# Patient Record
Sex: MALE | Race: OTHER | HISPANIC OR LATINO | Employment: FULL TIME | URBAN - METROPOLITAN AREA
[De-identification: names, ages, dates, MRNs, and addresses within clinical notes are randomized per-mention and may not be internally consistent; named-entity substitution may affect disease eponyms.]

---

## 2017-01-14 ENCOUNTER — HOSPITAL ENCOUNTER (EMERGENCY)
Facility: HOSPITAL | Age: 37
End: 2017-01-15
Attending: EMERGENCY MEDICINE | Admitting: EMERGENCY MEDICINE
Payer: COMMERCIAL

## 2017-01-14 DIAGNOSIS — R44.3 HALLUCINATIONS: Primary | ICD-10-CM

## 2017-01-14 LAB
AMPHETAMINES SERPL QL SCN: NEGATIVE
BARBITURATES UR QL: NEGATIVE
BENZODIAZ UR QL: POSITIVE
COCAINE UR QL: POSITIVE
ETHANOL EXG-MCNC: NORMAL MG/DL
METHADONE UR QL: NEGATIVE
OPIATES UR QL SCN: POSITIVE
PCP UR QL: NEGATIVE
THC UR QL: NEGATIVE

## 2017-01-14 PROCEDURE — 80307 DRUG TEST PRSMV CHEM ANLYZR: CPT | Performed by: EMERGENCY MEDICINE

## 2017-01-14 PROCEDURE — 82075 ASSAY OF BREATH ETHANOL: CPT | Performed by: EMERGENCY MEDICINE

## 2017-01-15 VITALS
TEMPERATURE: 97.5 F | DIASTOLIC BLOOD PRESSURE: 88 MMHG | RESPIRATION RATE: 16 BRPM | OXYGEN SATURATION: 97 % | HEART RATE: 66 BPM | WEIGHT: 218 LBS | SYSTOLIC BLOOD PRESSURE: 148 MMHG

## 2017-01-15 PROCEDURE — 99285 EMERGENCY DEPT VISIT HI MDM: CPT

## 2017-01-15 RX ORDER — LORAZEPAM 0.5 MG/1
0.5 TABLET ORAL ONCE
Status: COMPLETED | OUTPATIENT
Start: 2017-01-15 | End: 2017-01-15

## 2017-01-15 RX ORDER — LANOLIN ALCOHOL/MO/W.PET/CERES
3 CREAM (GRAM) TOPICAL
Status: DISCONTINUED | OUTPATIENT
Start: 2017-01-15 | End: 2017-01-15

## 2017-01-15 RX ORDER — LORAZEPAM 0.5 MG/1
1 TABLET ORAL ONCE
Status: COMPLETED | OUTPATIENT
Start: 2017-01-15 | End: 2017-01-15

## 2017-01-15 RX ORDER — LANOLIN ALCOHOL/MO/W.PET/CERES
3 CREAM (GRAM) TOPICAL
Status: DISCONTINUED | OUTPATIENT
Start: 2017-01-15 | End: 2017-01-15 | Stop reason: HOSPADM

## 2017-01-15 RX ADMIN — LORAZEPAM 0.5 MG: 0.5 TABLET ORAL at 15:31

## 2017-01-15 RX ADMIN — MELATONIN TAB 3 MG 3 MG: 3 TAB at 04:53

## 2017-01-15 RX ADMIN — LORAZEPAM 1 MG: 0.5 TABLET ORAL at 02:38

## 2017-08-16 ENCOUNTER — HOSPITAL ENCOUNTER (EMERGENCY)
Facility: HOSPITAL | Age: 37
Discharge: HOME | End: 2017-08-16
Attending: EMERGENCY MEDICINE
Payer: COMMERCIAL

## 2017-08-16 VITALS
SYSTOLIC BLOOD PRESSURE: 160 MMHG | RESPIRATION RATE: 18 BRPM | DIASTOLIC BLOOD PRESSURE: 75 MMHG | OXYGEN SATURATION: 97 % | WEIGHT: 226 LBS | HEART RATE: 74 BPM | TEMPERATURE: 97.9 F

## 2017-08-16 DIAGNOSIS — R51.9 HEADACHE: Primary | ICD-10-CM

## 2017-08-16 DIAGNOSIS — R11.2 NAUSEA AND VOMITING: ICD-10-CM

## 2017-08-16 PROCEDURE — 96361 HYDRATE IV INFUSION ADD-ON: CPT

## 2017-08-16 PROCEDURE — 99283 EMERGENCY DEPT VISIT LOW MDM: CPT

## 2017-08-16 PROCEDURE — 96374 THER/PROPH/DIAG INJ IV PUSH: CPT

## 2017-08-16 PROCEDURE — 96375 TX/PRO/DX INJ NEW DRUG ADDON: CPT

## 2017-08-16 RX ORDER — DIPHENHYDRAMINE HYDROCHLORIDE 50 MG/ML
25 INJECTION INTRAMUSCULAR; INTRAVENOUS ONCE
Status: COMPLETED | OUTPATIENT
Start: 2017-08-16 | End: 2017-08-16

## 2017-08-16 RX ORDER — KETOROLAC TROMETHAMINE 30 MG/ML
15 INJECTION, SOLUTION INTRAMUSCULAR; INTRAVENOUS ONCE
Status: COMPLETED | OUTPATIENT
Start: 2017-08-16 | End: 2017-08-16

## 2017-08-16 RX ORDER — METOCLOPRAMIDE HYDROCHLORIDE 5 MG/ML
10 INJECTION INTRAMUSCULAR; INTRAVENOUS ONCE
Status: COMPLETED | OUTPATIENT
Start: 2017-08-16 | End: 2017-08-16

## 2017-08-16 RX ORDER — IBUPROFEN 600 MG/1
600 TABLET ORAL EVERY 6 HOURS PRN
Qty: 30 TABLET | Refills: 0 | Status: SHIPPED | OUTPATIENT
Start: 2017-08-16 | End: 2018-06-22

## 2017-08-16 RX ORDER — ONDANSETRON 4 MG/1
4 TABLET, FILM COATED ORAL EVERY 6 HOURS
Qty: 12 TABLET | Refills: 0 | Status: SHIPPED | OUTPATIENT
Start: 2017-08-16 | End: 2018-06-22

## 2017-08-16 RX ADMIN — METOCLOPRAMIDE 10 MG: 5 INJECTION, SOLUTION INTRAMUSCULAR; INTRAVENOUS at 22:42

## 2017-08-16 RX ADMIN — KETOROLAC TROMETHAMINE 15 MG: 30 INJECTION, SOLUTION INTRAMUSCULAR at 22:38

## 2017-08-16 RX ADMIN — SODIUM CHLORIDE 1000 ML: 0.9 INJECTION, SOLUTION INTRAVENOUS at 22:38

## 2017-08-16 RX ADMIN — DIPHENHYDRAMINE HYDROCHLORIDE 25 MG: 50 INJECTION, SOLUTION INTRAMUSCULAR; INTRAVENOUS at 22:40

## 2018-06-22 ENCOUNTER — HOSPITAL ENCOUNTER (EMERGENCY)
Facility: HOSPITAL | Age: 38
Discharge: HOME | End: 2018-06-22
Attending: EMERGENCY MEDICINE
Payer: COMMERCIAL

## 2018-06-22 VITALS
HEIGHT: 68 IN | TEMPERATURE: 97.9 F | BODY MASS INDEX: 30.99 KG/M2 | OXYGEN SATURATION: 97 % | HEART RATE: 71 BPM | RESPIRATION RATE: 16 BRPM | SYSTOLIC BLOOD PRESSURE: 120 MMHG | DIASTOLIC BLOOD PRESSURE: 63 MMHG | WEIGHT: 204.5 LBS

## 2018-06-22 DIAGNOSIS — R51.9 LEFT-SIDED HEADACHE: ICD-10-CM

## 2018-06-22 DIAGNOSIS — K04.7 DENTAL INFECTION: ICD-10-CM

## 2018-06-22 DIAGNOSIS — R51.9 LEFT FACIAL PAIN: Primary | ICD-10-CM

## 2018-06-22 PROCEDURE — 99283 EMERGENCY DEPT VISIT LOW MDM: CPT

## 2018-06-22 PROCEDURE — 96372 THER/PROPH/DIAG INJ SC/IM: CPT

## 2018-06-22 RX ORDER — PENICILLIN V POTASSIUM 500 MG/1
500 TABLET ORAL 4 TIMES DAILY
Qty: 40 TABLET | Refills: 0 | Status: SHIPPED | OUTPATIENT
Start: 2018-06-22 | End: 2018-07-02

## 2018-06-22 RX ORDER — TRAMADOL HYDROCHLORIDE 50 MG/1
50 TABLET ORAL ONCE
Status: COMPLETED | OUTPATIENT
Start: 2018-06-22 | End: 2018-06-22

## 2018-06-22 RX ORDER — IBUPROFEN 800 MG/1
800 TABLET ORAL EVERY 8 HOURS PRN
Qty: 30 TABLET | Refills: 0 | Status: SHIPPED | OUTPATIENT
Start: 2018-06-22 | End: 2019-07-01 | Stop reason: ALTCHOICE

## 2018-06-22 RX ORDER — TRAMADOL HYDROCHLORIDE 50 MG/1
50 TABLET ORAL EVERY 6 HOURS PRN
Qty: 20 TABLET | Refills: 0 | Status: SHIPPED | OUTPATIENT
Start: 2018-06-22 | End: 2018-06-27

## 2018-06-22 RX ORDER — PENICILLIN V POTASSIUM 250 MG/1
500 TABLET ORAL ONCE
Status: COMPLETED | OUTPATIENT
Start: 2018-06-22 | End: 2018-06-22

## 2018-06-22 RX ORDER — KETOROLAC TROMETHAMINE 30 MG/ML
30 INJECTION, SOLUTION INTRAMUSCULAR; INTRAVENOUS ONCE
Status: COMPLETED | OUTPATIENT
Start: 2018-06-22 | End: 2018-06-22

## 2018-06-22 RX ADMIN — KETOROLAC TROMETHAMINE 30 MG: 30 INJECTION, SOLUTION INTRAMUSCULAR at 21:56

## 2018-06-22 RX ADMIN — PENICILLIN V POTASSIUM 500 MG: 250 TABLET, FILM COATED ORAL at 21:57

## 2018-06-22 RX ADMIN — TRAMADOL HYDROCHLORIDE 50 MG: 50 TABLET, FILM COATED ORAL at 21:57

## 2018-06-23 NOTE — ED PROVIDER NOTES
History  Chief Complaint   Patient presents with    Headache     pt reports headache pain x 3 days  Pt denies cold/cough symptoms  Pt reports pressure in front of head  No nausea or vomiting  Pt last took Tylenol at 1000 today  29 yo male with 3 days of L sided facial pain  Pt has had loose L upper 2nd molar recently and has some mild discomfort with biting down  Pain refers to L jaw and ear area and L forehead  NO cellulitis or swelling noted  Obvious lymphadenopathy which is tender to palpate  History provided by:  Patient and significant other   used: No    Dental Pain   Location:  Upper  Upper teeth location:  15/MARLO 2nd molar  Quality:  Aching and dull  Severity:  Severe  Onset quality:  Gradual  Duration:  3 days  Timing:  Constant  Progression:  Worsening  Chronicity:  New  Context: not dental caries, not malocclusion and normal dentition    Relieved by:  Nothing  Worsened by:  Nothing  Ineffective treatments:  None tried  Associated symptoms: facial pain and headaches (L frontal and L facial)    Associated symptoms: no congestion, no difficulty swallowing, no drooling, no facial swelling, no fever, no gum swelling, no neck pain, no neck swelling and no trismus    Risk factors: no diabetes and no immunosuppression        None       Past Medical History:   Diagnosis Date    Anxiety     Depression     GERD (gastroesophageal reflux disease)     Migraine     No known problems        Past Surgical History:   Procedure Laterality Date    NO PAST SURGERIES         History reviewed  No pertinent family history  I have reviewed and agree with the history as documented  Social History   Substance Use Topics    Smoking status: Current Every Day Smoker     Packs/day: 0 50     Types: Cigarettes    Smokeless tobacco: Never Used    Alcohol use No        Review of Systems   Constitutional: Negative for chills and fever  HENT: Positive for dental problem   Negative for congestion, drooling, facial swelling, rhinorrhea, sore throat, tinnitus, trouble swallowing and voice change  Eyes: Negative for visual disturbance  Respiratory: Negative for shortness of breath and wheezing  Cardiovascular: Negative for chest pain and palpitations  Gastrointestinal: Negative for abdominal pain, diarrhea, nausea and vomiting  Genitourinary: Negative for dysuria  Musculoskeletal: Negative for neck pain and neck stiffness  Skin: Negative for pallor and rash  Neurological: Positive for headaches (L frontal and L facial)  Negative for dizziness, seizures, syncope, speech difficulty and numbness  Hematological: Does not bruise/bleed easily  Psychiatric/Behavioral: Negative for confusion  The patient is not nervous/anxious  All other systems reviewed and are negative  Physical Exam  Physical Exam   Constitutional: He is oriented to person, place, and time  He appears well-developed and well-nourished  He appears distressed (uncomfortable)  HENT:   Head: Normocephalic and atraumatic  Right Ear: External ear normal    Left Ear: External ear normal    Mouth/Throat: Oropharynx is clear and moist    B/l TM nml   Eyes: EOM are normal  Pupils are equal, round, and reactive to light  Neck: Normal range of motion  Neck supple  Cardiovascular: Normal rate  No murmur heard  Pulmonary/Chest: Effort normal    Musculoskeletal: Normal range of motion  He exhibits no edema  Lymphadenopathy:     He has cervical adenopathy (L sided Periauricular, L anter cervical lymhadenopathy, no mastoid tenderness, swelling or erythema)  Neurological: He is alert and oriented to person, place, and time  Skin: Skin is warm  No rash noted  No pallor  Psychiatric: He has a normal mood and affect  His behavior is normal    Nursing note and vitals reviewed        Vital Signs  ED Triage Vitals [06/22/18 2106]   Temperature Pulse Respirations Blood Pressure SpO2   97 9 °F (36 6 °C) 71 16 120/63 97 %      Temp Source Heart Rate Source Patient Position - Orthostatic VS BP Location FiO2 (%)   Oral Monitor Sitting Right arm --      Pain Score       9           Vitals:    06/22/18 2106   BP: 120/63   Pulse: 71   Patient Position - Orthostatic VS: Sitting       Visual Acuity      ED Medications  Medications   ketorolac (TORADOL) injection 30 mg (30 mg Intramuscular Given 6/22/18 2156)   traMADol (ULTRAM) tablet 50 mg (50 mg Oral Given 6/22/18 2157)   penicillin V potassium (VEETID) tablet 500 mg (500 mg Oral Given 6/22/18 2157)       Diagnostic Studies  Results Reviewed     None                 No orders to display              Procedures  Procedures       Phone Contacts  ED Phone Contact    ED Course  ED Course as of Jun 22 2357 Fri Jun 22, 2018 2140 Pt seen and examined  31 yo male with 3 days of L sided facial pain  Pt has had loose L upper 2nd molar recently and has some mild discomfort with biting down  Pain refers to L jaw and ear area and L forehead  NO cellulitis or swelling noted  Obvious lymphadenopathy which is tender to palpate  Will treat with PCN, toradol IM and ultram and d/c home on same and f/u with dentist (GF has one he can follow up with)  MDM  CritCare Time    Disposition  Final diagnoses:   Left facial pain   Dental infection   Left-sided headache     Time reflects when diagnosis was documented in both MDM as applicable and the Disposition within this note     Time User Action Codes Description Comment    6/22/2018 10:09 PM Kiki Ramirez Add [R51] Left facial pain     6/22/2018 10:09 PM Christiano PENA Add [K04 7] Dental infection     6/22/2018 10:09 PM Christiano PENA Add [R51] Left-sided headache       ED Disposition     ED Disposition Condition Comment    Discharge  Vicky Bettencourt discharge to home/self care      Condition at discharge: Good        Follow-up Information     Follow up With Specialties Details [place] [address] Brandon Sosa, DO Family Medicine  As needed [address]  [address]  [phone]      your dentist  Call            Discharge Medication List as of 6/22/2018 10:10 PM      START taking these medications    Details   ibuprofen (MOTRIN) 800 mg tablet Take 1 tablet (800 mg total) by mouth every 8 (eight) hours as needed for mild pain or moderate pain for up to 10 days, Starting Fri 6/22/2018, Until Mon 7/2/2018, Print      penicillin V potassium (VEETID) 500 mg tablet Take 1 tablet (500 mg total) by mouth 4 (four) times a day for 10 days, Starting Fri 6/22/2018, Until Mon 7/2/2018, Print      traMADol (ULTRAM) 50 mg tablet Take 1 tablet (50 mg total) by mouth every 6 (six) hours as needed for moderate pain for up to 5 days, Starting Fri 6/22/2018, Until Wed 6/27/2018, Print           No discharge procedures on file      ED Provider  Electronically Signed by           Chico Argueta DO  06/22/18 2632

## 2018-06-23 NOTE — DISCHARGE INSTRUCTIONS
Dolor de bharat mercedes   LO QUE NECESITA SABER:   El dolor de [place] mercedes es un dolor o molestia que comienza de repente y KARAN rápidamente  Usted puede tener un dolor de bharat mercedes sólo cuando siente estrés o come ciertos alimentos  Otro tipo dolor de bharat mercedes puede producirse todos los días y a veces varias veces al día  INSTRUCCIONES SOBRE EL PERRY HOSPITALARIA:   Busque atención médica de inmediato si:   · Usted tiene dolor intenso  · Usted tiene entumecimiento en un lado de kitchen manuelito o cuerpo  · Usted tiene un dolor de bharat que ocurre después de un golpe en la bharat, leonila caída u otro trauma  · Tiene dolor de [place], está olvidadizo o confundido o tiene dificultad para hablar  · Tiene dolor de [place], rigidez en el derian y [place]  Pregúntele a kitchen [employer] vitaminas y minerales son adecuados para usted  · Usted tiene un dolor de bharat arelis y está vomitando  · Usted tiene dolor de bharat todos los días y no se [place] aun después de tratarlo  · [place] thania de [facility] u ocurren nuevos síntomas cuando tiene dolor de [place]  · Usted tiene preguntas o inquietudes acerca de kitchen condición o cuidado  Medicamentos:  Es posible que usted necesite alguno de los siguientes:  · Un medicamento con receta para el dolor  podrían ser [place]  El medicamento que recomienda kitchen médico dependerá del tipo de dolor de bharat que tenga  Usted necesitará miguel medicamentos para el dolor de bharat según las indicaciones para evitar un problema llamado dolor de bharat de rebote  Estos thania de [place] ocurren con el uso regular de analgésicos para los trastornos de dolor de [place]  · AINEs (Analgésicos antiinflamatorios no esteroides) fiona el ibuprofeno, ayudan a disminuir la inflamación, el dolor y la [place]  [place] medicamento esta disponible con o sin leonila receta médica  Los AINEs pueden causar sangrado estomacal o problemas renales en ciertas personas   Si usted ping un medicamento anticoagulante, siempre pregúntele a kitchen médico si los VIRGILIO son seguros para usted  Siempre renay la etiqueta de tito medicamento y [place] instrucciones  · El acetaminofén  [place] el dolor y baja la fiebre  Está disponible sin receta médica  Pregunte la cantidad y la frecuencia con que debe tomarlos  [address]  Renay las etiquetas de todos los demás medicamentos que esté usando para saber si también contienen acetaminofén, o pregunte a kitchen médico o farmacéutico  El acetaminofén puede causar daño en el hígado cuando no se ping de forma correcta  No use más de 3 gramos (3,000 miligramos) en total de acetaminofeno en un día  · Antidepresivos  se pueden administrar para algunos tipos de thania de [place]  · [place] alo medicamentos fiona se le haya indicado  Consulte con kitchen médico si usted anil que kitchen medicamento no le está ayudando o si presenta efectos secundarios  Infórmele si es alérgico a cualquier medicamento  Mantenga leonila lista actualizada de los [place], las vitaminas y los productos herbales que ping  Incluya los siguientes datos de los medicamentos: cantidad, frecuencia y motivo de administración  Traiga con usted la lista o los envases de la píldoras a alo citas de seguimiento  Lleve la lista de los medicamentos con usted en michelle de leonila emergencia  El manejo de kitchen síntomas:   · Aplique hielo o calor  en la glenn donde kitchen hijo siente el dolor de bharat  Utilice un paquete (compresa) de hielo o calor  Para un paquete de hielo, también puede colocar hielo molido en leonila bolsa plástica  Cubra el paquete de hielo o la bolsa con leonila toalla pequeña antes de aplicarla en la piel  Tanto el hielo fiona el calor ayudan a reducir el dolor, y el calor también contribuye a reducir los C H  [employer]  Aplique calor stanislav 20 a 30 minutos cada 2 horas  Aplique hielo stanislav 15 a 20 minutos cada hora  Aplique calor o hielo stanislav el tiempo y la cantidad de días que se le indique   [place] puede alternar el calor y el hielo  · Relaje alo músculos  Acuéstese en leonila posición cómoda y cierre alo ojos  Relaje alo músculos lentamente  Comience por los dedos de los pies y avance hacia arriba al lulú de kitchen cuerpo  · Registre en un diario alo thania de [place]  Escriba cuándo comienzan y terminan alo migrañas  Dorinda Adam y qué estaba haciendo cuando comenzó la migraña  Registre lo que comió y lo que tomó las 24 horas previas al comienzo de kitchen migraña  Fredna Faisal dolor y dónde le duele: Lleve un registro de lo que hizo para tratar kitchen Fabiano Situ y si obtuvo un resultado satisfactorio  Cómo prevenir un dolor de bharat mercedes:   · Evite cualquier cosa que provoque un dolor de bharat mercedes  Los ejemplos incluyen la exposición a sustancias químicas, las grandes altitudes o no dormir lo suficiente  Kayla leonila rutina para dormir  Acuéstese y [place] días a la misma hora  No utilice aparatos electrónicos antes de acostarse  Pueden provocarle un dolor de bharat o impedirle dormir magda  · No fume  La nicotina y otras sustancias químicas en los cigarrillos y puros pueden desencadenar un dolor de bharat mercedes o [place]  Pida información a kitchen médico si usted actualmente fuma y necesita ayuda para dejar de fumar  Los cigarrillos electrónicos o tabaco sin humo todavía contienen nicotina  Consulte con kitchen médico antes de [employer]  · Limite el consumo de alcohol según le indicaron  El alcohol puede provocar un dolor de bharat mercedes o empeorarlo  Si usted tiene thania de [place] de racimo, no greg alcohol stanislav un episodio  Para otros tipos de thania de [place], pregúntele a kitchen proveedor de atención médica si es seguro para usted beber alcohol  Pregunte cuál es la cantidad swan que puede beber y con qué frecuencia  · Ejercítese según indicaciones  El ejercicio puede reducir la tensión y [place] a aliviar el dolor de [place]   Propóngase hacer 30 minutos de [place] física desire todos los días de la [place]  Kitchen médico puede ayudarle a crear un plan de ejercicios  · Consuma alimentos saludables y variados  [address] frutas, verduras, productos lácteos bajos en grasa, lu [place], pescado y frijoles cocidos  Kitchen médico o dietista puede ayudarle a crear planes de comidas si desea evitar los alimentos que provocan thania de [place]  Acuda a alo consultas de control con kitchen médico según le indicaron  Traiga kitchen registro de thania de bharat con usted cuando visite a kitchen médico  Anote alo preguntas para que se acuerde de hacerlas stanislav alo visitas  © 2017 [address]to Information is for End User's use only and may not be sold, redistributed or otherwise used for commercial purposes  All illustrations and images included in CareNotes® are the copyrighted property of A D A M , Inc  or Kei Blackman  Esta información es sólo para uso en educación  Kitchen intención no es darle un consejo médico sobre enfermedades o tratamientos  Colsulte con kitchen Miranda Sherrell farmacéutico antes de seguir cualquier régimen médico para saber si es seguro y efectivo para usted  Absceso dental   LO QUE NECESITA SABER:   Un absceso dental es la acumulación de pus adentro o alrededor de un diente  La bacteria es la causa de un absceso dental  Las bacterias habitualmente entran al diente cuando el esmalte (parte exterior del diente) se daña debido a la caries dental  Las bacterias también pueden entrar el diente a través de leonila rotura o astillamiento en el diente, o un neo en la encía  Las partículas de alimento que se quedan atoradas entre los dientes por un tiempo extendido podrían también llevar a la formación de un absceso  INSTRUCCIONES SOBRE EL PERRY HOSPITALARIA:   Regrese a la rigo de emergencias si:   · Usted tiene dolor intenso  · Usted tiene dificultad para respirar a causa del dolor o la inflamación    Pregúntele a kitchen médico qué vitaminas y minerales son adecuados para usted  · [place] síntomas empeoran, aún después del [place]  · Sangra kitchen boca  · No puede comer o beber a causa del dolor o la inflamación  · El absceso se vuelve a formar  · Usted tiene leonila lesión que causa leonila grieta en kitchen diente  · Usted tiene preguntas o inquietudes acerca de kitchen condición o cuidado  Medicamentos:  Es posible que usted necesite alguno de los siguientes:  · Antibióticos  ayudan a tratar leonila infección bacteriana  · AINEs (Analgésicos antiinflamatorios no esteroides) fiona el ibuprofeno, ayudan a disminuir la inflamación, el dolor y la [place]  [place]ani medicamento esta disponible con o sin leonila receta médica  Los AINEs pueden causar sangrado estomacal o problemas renales en ciertas personas  Si usted ping un medicamento anticoagulante, siempre pregúntele a kitchen médico si los VIRGILIO son seguros para usted  Siempre renay la etiqueta de brittani medicamento y [place] instrucciones  · Acetaminofeno:  patsy el dolor y baja la fiebre  Está disponible sin receta médica  Pregunte la cantidad y la frecuencia con que debe tomarlos  [address]  Renay las etiquetas de todos los demás medicamentos que esté usando para saber si también contienen acetaminofén, o pregunte a kitchen médico o farmacéutico  El acetaminofén puede causar daño en el hígado cuando no se ping de forma correcta  No use más de 4 gramos (4000 miligramos) en total de acetaminofeno en un día  · Un medicamento con receta para el dolor  podrían ser Antonio Toure  Pregunte al médico cómo debe miguel brittani medicamento de forma swan  Algunos medicamentos recetados para el dolor contienen acetaminofén  No tome otros medicamentos que contengan acetaminofén sin consultarlo con kitchen médico  Demasiado acetaminofeno puede causar daño al hígado  Los medicamentos recetados para el dolor podrían causar estreñimiento  Pregunte a kitchen médico fiona prevenir o tratar estreñimiento        [facility] medicamentos fiona se le haya indicado  Consulte con de la o médico si usted anil que de la o medicamento no le está ayudando o si presenta efectos secundarios  Infórmele si es alérgico a algún medicamento  Mantenga leonila lista actualizada de los [place], las vitaminas y los productos herbales que ping  Incluya los siguientes datos de los medicamentos: cantidad, frecuencia y motivo de administración  Traiga con usted la lista o los envases de la píldoras a alo citas de seguimiento  Lleve la lista de los medicamentos con usted en michelle de leonila emergencia  Cuidados personales:   · Enjuáguese la boca cada 2 horas con agua salina  [place] ayudará a mantener el área limpia  · [place] suavemente alo [place] al día con un cepillo de [place]  [place] ayudará a mantener el área limpia  · Coma alimentos blandos fiona se le indique  Los alimentos blandos pueden causar [employer]  Por ejemplo, compota de [place], yogur y pasta cocida  Pregunte a de la o médico por cuánto tiempo necesita seguir estas indicaciones  · Aplique loenila compresa caliente en el diente o la encía  Utilice leonila malloy de algodón o leonila gasa empapada en agua tibia  Quite la compresa en 10 minutos o cuando se enfríe  Ulises esto 3 veces al día  Prevenir otro absceso:   · Michael dientes al menos 2 veces al día con leonila pasta de dientes con fluoruro  · Use hilo dental para limpiar entre los dientes al menos leonila vez al día  · Enjuague la boca con agua o con enjuague bucal después de cada comida y [place]  · Mastique chicle sin azúcar después de cada comida y [place]  · Limite los alimentos que son pegajosos y altos en azúcar, fiona pasas de uva  También limite las bebidas altas en azúcar, fiona los refrescos  · Visite a de la o dentista cada 6 meses para limpiezas dentales y exámenes orales  Acuda a alo consultas de control con de la o médico en 24 horas:  De [place] médico revisará los dientes y las encías   Anote alo preguntas para que se acuerde de fadi stanislav alo visitas  © 2017 26[address] Alan Alberto Information is for End User's use only and may not be sold, redistributed or otherwise used for commercial purposes  All illustrations and images included in CareNotes® are the copyrighted property of A D A M , Inc  or Kei Blackman  Esta información es sólo para uso en educación  De [place] intención no es darle un consejo médico sobre enfermedades o tratamientos  Colsulte con de la o [place] farmacéutico antes de seguir cualquier régimen médico para saber si es seguro y efectivo para usted

## 2019-06-02 ENCOUNTER — HOSPITAL ENCOUNTER (EMERGENCY)
Facility: HOSPITAL | Age: 39
Discharge: HOME | End: 2019-06-02
Attending: EMERGENCY MEDICINE | Admitting: EMERGENCY MEDICINE
Payer: COMMERCIAL

## 2019-06-02 VITALS
OXYGEN SATURATION: 99 % | SYSTOLIC BLOOD PRESSURE: 126 MMHG | BODY MASS INDEX: 29.46 KG/M2 | RESPIRATION RATE: 20 BRPM | HEART RATE: 85 BPM | DIASTOLIC BLOOD PRESSURE: 64 MMHG | TEMPERATURE: 98.5 F | WEIGHT: 193.78 LBS

## 2019-06-02 DIAGNOSIS — L29.9 ITCHING: Primary | ICD-10-CM

## 2019-06-02 PROCEDURE — 99282 EMERGENCY DEPT VISIT SF MDM: CPT

## 2019-06-02 PROCEDURE — 99282 EMERGENCY DEPT VISIT SF MDM: CPT | Performed by: PHYSICIAN ASSISTANT

## 2019-06-02 RX ORDER — PREDNISONE 20 MG/1
20 TABLET ORAL 3 TIMES DAILY
Qty: 15 TABLET | Refills: 0 | Status: SHIPPED | OUTPATIENT
Start: 2019-06-02 | End: 2019-06-07

## 2019-06-02 RX ORDER — HYDROXYZINE HYDROCHLORIDE 25 MG/1
25 TABLET, FILM COATED ORAL EVERY 6 HOURS
Qty: 12 TABLET | Refills: 0 | Status: SHIPPED | OUTPATIENT
Start: 2019-06-02 | End: 2019-07-01 | Stop reason: ALTCHOICE

## 2019-07-01 ENCOUNTER — APPOINTMENT (EMERGENCY)
Dept: CT IMAGING | Facility: HOSPITAL | Age: 39
DRG: 283 | End: 2019-07-01
Payer: COMMERCIAL

## 2019-07-01 ENCOUNTER — HOSPITAL ENCOUNTER (INPATIENT)
Facility: HOSPITAL | Age: 39
LOS: 8 days | Discharge: HOME | DRG: 283 | End: 2019-07-09
Attending: EMERGENCY MEDICINE | Admitting: HOSPITALIST
Payer: COMMERCIAL

## 2019-07-01 DIAGNOSIS — R17 JAUNDICE: Primary | ICD-10-CM

## 2019-07-01 DIAGNOSIS — R10.11 RUQ PAIN: ICD-10-CM

## 2019-07-01 DIAGNOSIS — R19.7 NAUSEA VOMITING AND DIARRHEA: ICD-10-CM

## 2019-07-01 DIAGNOSIS — R74.01 TRANSAMINITIS: ICD-10-CM

## 2019-07-01 DIAGNOSIS — R11.2 NAUSEA VOMITING AND DIARRHEA: ICD-10-CM

## 2019-07-01 DIAGNOSIS — R17 ICTERUS: ICD-10-CM

## 2019-07-01 DIAGNOSIS — F19.90 IV DRUG USER: ICD-10-CM

## 2019-07-01 DIAGNOSIS — K29.80 DUODENITIS: ICD-10-CM

## 2019-07-01 PROBLEM — R93.3 ABNORMAL CT SCAN, SMALL BOWEL: Status: ACTIVE | Noted: 2019-07-01

## 2019-07-01 PROBLEM — R16.1 SPLENOMEGALY: Status: ACTIVE | Noted: 2019-07-01

## 2019-07-01 PROBLEM — K52.9 GASTROENTERITIS: Status: ACTIVE | Noted: 2019-07-01

## 2019-07-01 LAB
ALBUMIN SERPL BCP-MCNC: 3.5 G/DL (ref 3.5–5)
ALP SERPL-CCNC: 212 U/L (ref 46–116)
ALT SERPL W P-5'-P-CCNC: 1659 U/L (ref 12–78)
AMPHETAMINES SERPL QL SCN: NEGATIVE
ANION GAP SERPL CALCULATED.3IONS-SCNC: 12 MMOL/L (ref 4–13)
APTT PPP: 34 SECONDS (ref 23–37)
APTT PPP: 35 SECONDS (ref 23–37)
AST SERPL W P-5'-P-CCNC: 1925 U/L (ref 5–45)
BACTERIA UR QL AUTO: ABNORMAL /HPF
BARBITURATES UR QL: NEGATIVE
BASOPHILS # BLD AUTO: 0.04 THOUSANDS/ΜL (ref 0–0.1)
BASOPHILS NFR BLD AUTO: 1 % (ref 0–1)
BENZODIAZ UR QL: NEGATIVE
BILIRUB DIRECT SERPL-MCNC: 9.87 MG/DL (ref 0–0.2)
BILIRUB SERPL-MCNC: 10.99 MG/DL (ref 0.2–1)
BILIRUB UR QL STRIP: ABNORMAL
BUN SERPL-MCNC: 13 MG/DL (ref 5–25)
CALCIUM SERPL-MCNC: 9.2 MG/DL (ref 8.3–10.1)
CHLORIDE SERPL-SCNC: 103 MMOL/L (ref 100–108)
CK SERPL-CCNC: 33 U/L (ref 39–308)
CLARITY UR: CLEAR
CO2 SERPL-SCNC: 26 MMOL/L (ref 21–32)
COCAINE UR QL: NEGATIVE
COLOR UR: ABNORMAL
CREAT SERPL-MCNC: 0.82 MG/DL (ref 0.6–1.3)
EOSINOPHIL # BLD AUTO: 0.2 THOUSAND/ΜL (ref 0–0.61)
EOSINOPHIL NFR BLD AUTO: 3 % (ref 0–6)
ERYTHROCYTE [DISTWIDTH] IN BLOOD BY AUTOMATED COUNT: 13.8 % (ref 11.6–15.1)
GFR SERPL CREATININE-BSD FRML MDRD: 114 ML/MIN/1.73SQ M
GLUCOSE SERPL-MCNC: 117 MG/DL (ref 65–140)
GLUCOSE UR STRIP-MCNC: NEGATIVE MG/DL
HCT VFR BLD AUTO: 43.9 % (ref 36.5–49.3)
HGB BLD-MCNC: 14.8 G/DL (ref 12–17)
HGB UR QL STRIP.AUTO: NEGATIVE
HIV 1+2 AB+HIV1 P24 AG SERPL QL IA: NORMAL
HIV1 P24 AG SER QL: NORMAL
IMM GRANULOCYTES # BLD AUTO: 0.02 THOUSAND/UL (ref 0–0.2)
IMM GRANULOCYTES NFR BLD AUTO: 0 % (ref 0–2)
INR PPP: 0.94 (ref 0.84–1.19)
INR PPP: 0.97 (ref 0.84–1.19)
KETONES UR STRIP-MCNC: ABNORMAL MG/DL
LACTATE SERPL-SCNC: 0.7 MMOL/L (ref 0.5–2)
LEUKOCYTE ESTERASE UR QL STRIP: NEGATIVE
LIPASE SERPL-CCNC: 57 U/L (ref 73–393)
LYMPHOCYTES # BLD AUTO: 1.56 THOUSANDS/ΜL (ref 0.6–4.47)
LYMPHOCYTES NFR BLD AUTO: 23 % (ref 14–44)
MAGNESIUM SERPL-MCNC: 1.9 MG/DL (ref 1.6–2.6)
MCH RBC QN AUTO: 29.1 PG (ref 26.8–34.3)
MCHC RBC AUTO-ENTMCNC: 33.7 G/DL (ref 31.4–37.4)
MCV RBC AUTO: 86 FL (ref 82–98)
METHADONE UR QL: NEGATIVE
MONOCYTES # BLD AUTO: 0.96 THOUSAND/ΜL (ref 0.17–1.22)
MONOCYTES NFR BLD AUTO: 14 % (ref 4–12)
NEUTROPHILS # BLD AUTO: 3.99 THOUSANDS/ΜL (ref 1.85–7.62)
NEUTS SEG NFR BLD AUTO: 59 % (ref 43–75)
NITRITE UR QL STRIP: NEGATIVE
NON-SQ EPI CELLS URNS QL MICRO: ABNORMAL /HPF
NRBC BLD AUTO-RTO: 0 /100 WBCS
OPIATES UR QL SCN: NEGATIVE
PCP UR QL: NEGATIVE
PH UR STRIP.AUTO: 7 [PH] (ref 4.5–8)
PLATELET # BLD AUTO: 252 THOUSANDS/UL (ref 149–390)
PLATELET # BLD AUTO: 277 THOUSANDS/UL (ref 149–390)
PMV BLD AUTO: 12 FL (ref 8.9–12.7)
PMV BLD AUTO: 12.3 FL (ref 8.9–12.7)
POTASSIUM SERPL-SCNC: 3.7 MMOL/L (ref 3.5–5.3)
PROT SERPL-MCNC: 7.1 G/DL (ref 6.4–8.2)
PROT UR STRIP-MCNC: ABNORMAL MG/DL
PROTHROMBIN TIME: 12.7 SECONDS (ref 11.6–14.5)
PROTHROMBIN TIME: 13 SECONDS (ref 11.6–14.5)
RBC # BLD AUTO: 5.09 MILLION/UL (ref 3.88–5.62)
RBC #/AREA URNS AUTO: ABNORMAL /HPF
SODIUM SERPL-SCNC: 141 MMOL/L (ref 136–145)
SP GR UR STRIP.AUTO: 1.02 (ref 1–1.03)
THC UR QL: NEGATIVE
TSH SERPL DL<=0.05 MIU/L-ACNC: 1.02 UIU/ML (ref 0.36–3.74)
UROBILINOGEN UR QL STRIP.AUTO: 1 E.U./DL
WBC # BLD AUTO: 6.77 THOUSAND/UL (ref 4.31–10.16)
WBC #/AREA URNS AUTO: ABNORMAL /HPF

## 2019-07-01 PROCEDURE — 87340 HEPATITIS B SURFACE AG IA: CPT | Performed by: NURSE PRACTITIONER

## 2019-07-01 PROCEDURE — 74177 CT ABD & PELVIS W/CONTRAST: CPT

## 2019-07-01 PROCEDURE — 85730 THROMBOPLASTIN TIME PARTIAL: CPT | Performed by: PHYSICIAN ASSISTANT

## 2019-07-01 PROCEDURE — 84443 ASSAY THYROID STIM HORMONE: CPT | Performed by: NURSE PRACTITIONER

## 2019-07-01 PROCEDURE — 80307 DRUG TEST PRSMV CHEM ANLYZR: CPT | Performed by: NURSE PRACTITIONER

## 2019-07-01 PROCEDURE — 36415 COLL VENOUS BLD VENIPUNCTURE: CPT | Performed by: NURSE PRACTITIONER

## 2019-07-01 PROCEDURE — 86803 HEPATITIS C AB TEST: CPT | Performed by: NURSE PRACTITIONER

## 2019-07-01 PROCEDURE — 85610 PROTHROMBIN TIME: CPT | Performed by: PHYSICIAN ASSISTANT

## 2019-07-01 PROCEDURE — 83690 ASSAY OF LIPASE: CPT | Performed by: NURSE PRACTITIONER

## 2019-07-01 PROCEDURE — 87806 HIV AG W/HIV1&2 ANTB W/OPTIC: CPT | Performed by: PHYSICIAN ASSISTANT

## 2019-07-01 PROCEDURE — 99223 1ST HOSP IP/OBS HIGH 75: CPT | Performed by: PHYSICIAN ASSISTANT

## 2019-07-01 PROCEDURE — 99285 EMERGENCY DEPT VISIT HI MDM: CPT | Performed by: NURSE PRACTITIONER

## 2019-07-01 PROCEDURE — 82248 BILIRUBIN DIRECT: CPT | Performed by: PHYSICIAN ASSISTANT

## 2019-07-01 PROCEDURE — 82550 ASSAY OF CK (CPK): CPT | Performed by: NURSE PRACTITIONER

## 2019-07-01 PROCEDURE — 86705 HEP B CORE ANTIBODY IGM: CPT | Performed by: NURSE PRACTITIONER

## 2019-07-01 PROCEDURE — 86704 HEP B CORE ANTIBODY TOTAL: CPT | Performed by: NURSE PRACTITIONER

## 2019-07-01 PROCEDURE — 87389 HIV-1 AG W/HIV-1&-2 AB AG IA: CPT | Performed by: PHYSICIAN ASSISTANT

## 2019-07-01 PROCEDURE — 83605 ASSAY OF LACTIC ACID: CPT | Performed by: NURSE PRACTITIONER

## 2019-07-01 PROCEDURE — 85730 THROMBOPLASTIN TIME PARTIAL: CPT | Performed by: NURSE PRACTITIONER

## 2019-07-01 PROCEDURE — 87040 BLOOD CULTURE FOR BACTERIA: CPT | Performed by: NURSE PRACTITIONER

## 2019-07-01 PROCEDURE — 83735 ASSAY OF MAGNESIUM: CPT | Performed by: NURSE PRACTITIONER

## 2019-07-01 PROCEDURE — 80053 COMPREHEN METABOLIC PANEL: CPT | Performed by: NURSE PRACTITIONER

## 2019-07-01 PROCEDURE — 80074 ACUTE HEPATITIS PANEL: CPT | Performed by: NURSE PRACTITIONER

## 2019-07-01 PROCEDURE — 96374 THER/PROPH/DIAG INJ IV PUSH: CPT

## 2019-07-01 PROCEDURE — 85025 COMPLETE CBC W/AUTO DIFF WBC: CPT | Performed by: NURSE PRACTITIONER

## 2019-07-01 PROCEDURE — 99284 EMERGENCY DEPT VISIT MOD MDM: CPT

## 2019-07-01 PROCEDURE — 85610 PROTHROMBIN TIME: CPT | Performed by: NURSE PRACTITIONER

## 2019-07-01 PROCEDURE — 96361 HYDRATE IV INFUSION ADD-ON: CPT

## 2019-07-01 PROCEDURE — 85049 AUTOMATED PLATELET COUNT: CPT | Performed by: PHYSICIAN ASSISTANT

## 2019-07-01 PROCEDURE — 81001 URINALYSIS AUTO W/SCOPE: CPT

## 2019-07-01 RX ORDER — ONDANSETRON 2 MG/ML
4 INJECTION INTRAMUSCULAR; INTRAVENOUS EVERY 6 HOURS PRN
Status: DISCONTINUED | OUTPATIENT
Start: 2019-07-01 | End: 2019-07-09

## 2019-07-01 RX ORDER — BUPRENORPHINE AND NALOXONE 2; .5 MG/1; MG/1
2 FILM, SOLUBLE BUCCAL; SUBLINGUAL 2 TIMES DAILY
Status: DISCONTINUED | OUTPATIENT
Start: 2019-07-02 | End: 2019-07-09 | Stop reason: HOSPADM

## 2019-07-01 RX ORDER — BUPRENORPHINE HYDROCHLORIDE AND NALOXONE HYDROCHLORIDE DIHYDRATE 8; 2 MG/1; MG/1
1 TABLET SUBLINGUAL 2 TIMES DAILY
COMMUNITY

## 2019-07-01 RX ORDER — HEPARIN SODIUM 5000 [USP'U]/ML
5000 INJECTION, SOLUTION INTRAVENOUS; SUBCUTANEOUS EVERY 8 HOURS SCHEDULED
Status: DISCONTINUED | OUTPATIENT
Start: 2019-07-01 | End: 2019-07-09 | Stop reason: HOSPADM

## 2019-07-01 RX ORDER — SODIUM CHLORIDE 9 MG/ML
75 INJECTION, SOLUTION INTRAVENOUS CONTINUOUS
Status: DISCONTINUED | OUTPATIENT
Start: 2019-07-01 | End: 2019-07-09 | Stop reason: HOSPADM

## 2019-07-01 RX ORDER — ONDANSETRON 2 MG/ML
4 INJECTION INTRAMUSCULAR; INTRAVENOUS ONCE
Status: COMPLETED | OUTPATIENT
Start: 2019-07-01 | End: 2019-07-01

## 2019-07-01 RX ORDER — SODIUM CHLORIDE 9 MG/ML
125 INJECTION, SOLUTION INTRAVENOUS CONTINUOUS
Status: DISCONTINUED | OUTPATIENT
Start: 2019-07-01 | End: 2019-07-01

## 2019-07-01 RX ADMIN — IOHEXOL 100 ML: 350 INJECTION, SOLUTION INTRAVENOUS at 20:45

## 2019-07-01 RX ADMIN — HEPARIN SODIUM 5000 UNITS: 5000 INJECTION INTRAVENOUS; SUBCUTANEOUS at 22:38

## 2019-07-01 RX ADMIN — SODIUM CHLORIDE 75 ML/HR: 0.9 INJECTION, SOLUTION INTRAVENOUS at 22:39

## 2019-07-01 RX ADMIN — ONDANSETRON 4 MG: 2 INJECTION INTRAMUSCULAR; INTRAVENOUS at 19:34

## 2019-07-01 RX ADMIN — SODIUM CHLORIDE 1000 ML: 0.9 INJECTION, SOLUTION INTRAVENOUS at 19:34

## 2019-07-01 NOTE — ED PROVIDER NOTES
History  Chief Complaint   Patient presents with    Vomiting     vomiting, diarrhea, abd pain itching for "days" states itching [id number]  also reprots yellow skin  This is a 41year old male who states has been itching x 1 month  He states he was seen in the ED 1 month ago and given atarax and his symptoms have worsened  He states that he not only has itching but now has yellow eyes, yellow skin and abdominal pain mostlyin the RUQ  Vomiting and diarrhea which has gotten worse in the last 3 days  He states that he called his PCP today and was told to come to the ED  He states he is an IV drug use and "sniffs"  He states he uses heroin, cocaine, fentanyl  Last he states was 2 months ago  Pt states only social alcohol use  He denies HIV or Hepatitis  History provided by:  Medical records, patient and spouse   used: No    Vomiting   Severity:  Moderate  Duration:  3 days  Timing:  Constant  Progression:  Worsening  Chronicity:  New  Associated symptoms: abdominal pain and diarrhea        Prior to Admission Medications   Prescriptions Last Dose Informant Patient Reported? Taking? buprenorphine-naloxone (SUBOXONE) 8-2 mg per SL tablet  Self Yes Yes   Sig: Place 1 tablet under the tongue 2 (two) times a day      Facility-Administered Medications: None       Past Medical History:   Diagnosis Date    Anxiety     Depression     GERD (gastroesophageal reflux disease)     Migraine     No known problems        Past Surgical History:   Procedure Laterality Date    NO PAST SURGERIES         History reviewed  No pertinent family history  I have reviewed and agree with the history as documented      Social History     Tobacco Use    Smoking status: Current Every Day Smoker     Packs/day: 0 50     Types: Cigarettes    Smokeless tobacco: Never Used   Substance Use Topics    Alcohol use: No    Drug use: Yes     Types: Heroin, Prescription     Comment:  suboxen        Review of Systems Constitutional: Positive for appetite change  HENT: Negative  Eyes:        Yellow eyes      Respiratory: Negative  Cardiovascular: Negative  Gastrointestinal: Positive for abdominal pain, diarrhea, nausea and vomiting  Endocrine: Negative  Genitourinary: Negative  Musculoskeletal: Negative  Skin: Positive for color change  Allergic/Immunologic: Negative  Neurological: Negative  Hematological: Negative  Psychiatric/Behavioral: Negative  Physical Exam  Physical Exam   Constitutional: He is oriented to person, place, and time  He appears well-developed and well-nourished  He appears distressed  Appears uncomfortable    HENT:   Head: Normocephalic and atraumatic  Right Ear: External ear normal    Left Ear: External ear normal    Nose: Nose normal    Mouth/Throat: No oropharyngeal exudate  Yellow tinge to upper palate      Eyes: Pupils are equal, round, and reactive to light  EOM are normal  Scleral icterus is present  Neck: Normal range of motion  Neck supple  Cardiovascular: Normal rate, regular rhythm and normal heart sounds  Pulmonary/Chest: Effort normal and breath sounds normal    Abdominal: Soft  Bowel sounds are normal  He exhibits no distension  There is tenderness  There is guarding  RUQ tenderness      Musculoskeletal: Normal range of motion  Neurological: He is alert and oriented to person, place, and time  Skin: Skin is warm and dry  Capillary refill takes less than 2 seconds  He is not diaphoretic  Yellow appearing skin    Psychiatric: He has a normal mood and affect  His behavior is normal  Judgment and thought content normal    Nursing note and vitals reviewed        Vital Signs  ED Triage Vitals   Temperature Pulse Respirations Blood Pressure SpO2   07/01/19 1856 07/01/19 1856 07/01/19 1856 07/01/19 1856 07/01/19 1856   99 6 °F (37 6 °C) 90 16 125/74 98 %      Temp Source Heart Rate Source Patient Position - Orthostatic VS BP Location FiO2 (%) 07/01/19 1856 07/01/19 2059 07/01/19 1856 07/01/19 1856 --   Temporal Monitor Sitting Right arm       Pain Score       07/01/19 1856       7           Vitals:    07/01/19 1856 07/01/19 2059   BP: 125/74 112/58   Pulse: 90 (!) 51   Patient Position - Orthostatic VS: Sitting Sitting         Visual Acuity      ED Medications  Medications   sodium chloride 0 9 % bolus 1,000 mL (0 mL Intravenous Stopped 7/1/19 2125)   ondansetron (ZOFRAN) injection 4 mg (4 mg Intravenous Given 7/1/19 1934)   iohexol (OMNIPAQUE) 350 MG/ML injection (MULTI-DOSE) 100 mL (100 mL Intravenous Given 7/1/19 2045)       Diagnostic Studies  Results Reviewed     Procedure Component Value Units Date/Time    Bilirubin, direct [[id number]]  (Abnormal) Collected:  07/01/19 1925    Lab Status:  Final result Specimen:  Blood from Arm, Right Updated:  07/01/19 2200     Bilirubin, Direct 9 87 mg/dL     Urine Microscopic [[id number]]  (Abnormal) Collected:  07/01/19 2020    Lab Status:  Final result Specimen:  Urine, Clean Catch Updated:  07/01/19 2049     RBC, UA None Seen /hpf      WBC, UA 0-1 /hpf      Epithelial Cells Occasional /hpf      Bacteria, UA None Seen /hpf     Rapid drug screen, urine [[id number]]  (Normal) Collected:  07/01/19 2010    Lab Status:  Final result Specimen:  Urine, Clean Catch Updated:  07/01/19 2033     Amph/Meth UR Negative     Barbiturate Ur Negative     Benzodiazepine Urine Negative     Cocaine Urine Negative     Methadone Urine Negative     Opiate Urine Negative     PCP Ur Negative     THC Urine Negative    Narrative:       FOR MEDICAL PURPOSES ONLY  IF CONFIRMATION NEEDED PLEASE CONTACT THE LAB WITHIN 5 DAYS      Drug Screen Cutoff Levels:  AMPHETAMINE/METHAMPHETAMINES  1000 ng/mL  BARBITURATES     200 ng/mL  BENZODIAZEPINES     200 ng/mL  COCAINE      300 ng/mL  METHADONE      300 ng/mL  OPIATES      300 ng/mL  PHENCYCLIDINE     25 ng/mL  THC       50 ng/mL      Comprehensive metabolic panel [[id number]]  (Abnormal) Collected:  07/01/19 1925    Lab Status:  Final result Specimen:  Blood from Arm, Right Updated:  07/01/19 2028     Sodium 141 mmol/L      Potassium 3 7 mmol/L      Chloride 103 mmol/L      CO2 26 mmol/L      ANION GAP 12 mmol/L      BUN 13 mg/dL      Creatinine 0 82 mg/dL      Glucose 117 mg/dL      Calcium 9 2 mg/dL      AST 1,925 U/L      ALT 1,659 U/L      Alkaline Phosphatase 212 U/L      Total Protein 7 1 g/dL      Albumin 3 5 g/dL      Total Bilirubin 10 99 mg/dL      eGFR 114 ml/min/1 73sq m     Narrative:       [place] guidelines for Chronic Kidney Disease (CKD):     Stage 1 with normal or high GFR (GFR > 90 mL/min/1 73 square meters)    Stage 2 Mild CKD (GFR = 60-89 mL/min/1 73 square meters)    Stage 3A Moderate CKD (GFR = 45-59 mL/min/1 73 square meters)    Stage 3B Moderate CKD (GFR = 30-44 mL/min/1 73 square meters)    Stage 4 Severe CKD (GFR = 15-29 mL/min/1 73 square meters)    Stage 5 End Stage CKD (GFR <15 mL/min/1 73 square meters)  Note: GFR calculation is accurate only with a steady state creatinine    TSH [[id number]]  (Normal) Collected:  07/01/19 1925    Lab Status:  Final result Specimen:  Blood from Arm, Right Updated:  07/01/19 2017     TSH 3RD GENERATON 1 023 uIU/mL     Narrative:       Patients undergoing fluorescein dye angiography may retain small amounts of fluorescein in the body for 48-72 hours post procedure  Samples containing fluorescein can produce falsely depressed TSH values  If the patient had this procedure,a specimen should be resubmitted post fluorescein clearance        Magnesium [[id number]]  (Normal) Collected:  07/01/19 1925    Lab Status:  Final result Specimen:  Blood from Arm, Right Updated:  07/01/19 2017     Magnesium 1 9 mg/dL     Lipase [[id number]]  (Abnormal) Collected:  07/01/19 1925    Lab Status:  Final result Specimen:  Blood from Arm, Right Updated:  07/01/19 2017     Lipase 57 u/L     CK Total with Reflex CKMB [[id number]] (Abnormal) Collected:  07/01/19 1925    Lab Status:  Final result Specimen:  Blood from Arm, Right Updated:  07/01/19 2014     Total CK 33 U/L     POCT urinalysis dipstick [[id number]]  (Abnormal) Resulted:  07/01/19 2009    Lab Status:  Final result Specimen:  Urine Updated:  07/01/19 2012    ED Urine Macroscopic [[id number]]  (Abnormal) Collected:  07/01/19 2020    Lab Status:  Final result Specimen:  Urine Updated:  07/01/19 2009     Color, UA Brown     Clarity, UA Clear     pH, UA 7 0     Leukocytes, UA Negative     Nitrite, UA Negative     Protein, UA 30 (1+) mg/dl      Glucose, UA Negative mg/dl      Ketones, UA Trace mg/dl      Urobilinogen, UA 1 0 E U /dl      Bilirubin, UA Interference- unable to analyze     Blood, UA Negative     Specific Gravity, UA 1 020    Narrative:       CLINITEK RESULT    Protime-INR [[id number]]  (Normal) Collected:  07/01/19 1925    Lab Status:  Final result Specimen:  Blood from Arm, Right Updated:  07/01/19 2002     Protime 12 7 seconds      INR 0 94    APTT [[id number]]  (Normal) Collected:  07/01/19 1925    Lab Status:  Final result Specimen:  Blood from Arm, Right Updated:  07/01/19 2002     PTT 34 seconds     Lactic acid, plasma [[id number]]  (Normal) Collected:  07/01/19 1925    Lab Status:  Final result Specimen:  Blood from Arm, Right Updated:  07/01/19 2000     LACTIC ACID 0 7 mmol/L     Narrative:       Result may be elevated if tourniquet was used during collection      CBC and differential [[id number]]  (Abnormal) Collected:  07/01/19 1925    Lab Status:  Final result Specimen:  Blood from Arm, Right Updated:  07/01/19 1942     WBC 6 77 Thousand/uL      RBC 5 09 Million/uL      Hemoglobin 14 8 g/dL      Hematocrit 43 9 %      MCV 86 fL      MCH 29 1 pg      MCHC 33 7 g/dL      RDW 13 8 %      MPV 12 3 fL      Platelets 390 Thousands/uL      nRBC 0 /100 WBCs      Neutrophils Relative 59 %      Immat GRANS % 0 %      Lymphocytes Relative 23 %      Monocytes Relative 14 % Eosinophils Relative 3 %      Basophils Relative 1 %      Neutrophils Absolute 3 99 Thousands/µL      Immature Grans Absolute 0 02 Thousand/uL      Lymphocytes Absolute 1 56 Thousands/µL      Monocytes Absolute 0 96 Thousand/µL      Eosinophils Absolute 0 20 Thousand/µL      Basophils Absolute 0 04 Thousands/µL     Blood culture #2 [[id number]] Collected:  07/01/19 1934    Lab Status: In process Specimen:  Blood from Arm, Left Updated:  07/01/19 1939    Hepatitis panel, acute [[id number]] Collected:  07/01/19 1925    Lab Status: In process Specimen:  Blood from Arm, Right Updated:  07/01/19 1936    Chronic Hepatitis Panel [[id number]] Collected:  07/01/19 1925    Lab Status: In process Specimen:  Blood from Arm, Right Updated:  07/01/19 1936    Blood culture #1 [[id number]] Collected:  07/01/19 1923    Lab Status: In process Specimen:  Blood from Arm, Right Updated:  07/01/19 1936                 CT abdomen pelvis with contrast   Final Result by Hadley Torrez MD (07/01 2102)         1  Small area of nonspecific inflammatory strandy densities in the mesenteric fat adjacent to the 2nd portion of the duodenum and right lobe of the liver, indicative of inflammation perhaps secondary to duodenitis  Further clinical assessment    recommended  2   1 1 cm ovoid nodule along the wall of the 2nd portion of the duodenum may represent a collapsed duodenal diverticulum  However a focal segment of duodenitis/inflammation is in the differential diagnosis as is a mural nodule  Recommend endoscopic    evaluation perhaps in 2-4 weeks after the inflammatory phase of the duodenitis resolves  Further clinical assessment recommended  3   Somewhat contracted gallbladder  If symptoms are referrable to the gallbladder, consider ultrasound after a 6 hour fast    4   Normal-appearing appendix              Workstation performed: [id number]                    Procedures  Procedures       ED Course  ED Course as of Jul 01 2206   [facility] Jul 01, 2019 2040 Urine - bilirubin/interference   UDS negative   AST 1,925  ALT 1659  Total bili 10 99  CBC unremarkable  2103 CT abnormal red flag popped up but no results as of yet  2106 IMPRESSION:        1  Small area of nonspecific inflammatory strandy densities in the mesenteric fat adjacent to the 2nd portion of the duodenum and right lobe of the liver, indicative of inflammation perhaps secondary to duodenitis  Further clinical assessment   recommended  2   1 1 cm ovoid nodule along the wall of the 2nd portion of the duodenum may represent a collapsed duodenal diverticulum  However a focal segment of duodenitis/inflammation is in the differential diagnosis as is a mural nodule  Recommend endoscopic   evaluation perhaps in 2-4 weeks after the inflammatory phase of the duodenitis resolves  Further clinical assessment recommended  3   Somewhat contracted gallbladder  If symptoms are referrable to the gallbladder, consider ultrasound after a 6 hour fast   4   Normal-appearing appendix             2107 All labs and radiology results reviewed and discussed with pt and wife  Explained would be admitted with GI consult  Pt and wife agree with POC        2109 Page to [facility]        2116 SLIM will accept for admission            /58 (BP Location: Right arm)   Pulse (!) 51   Temp 99 6 °F (37 6 °C) (Temporal)   Resp 20   Wt 85 7 kg (188 lb 14 9 oz)   SpO2 96%   BMI 28 73 kg/m²                             MDM  Number of Diagnoses or Management Options  Diagnosis management comments: Differential diagnosis:  Liver failure  Hepatitis C, B, A  HIV  Bile duct stone blockage  Pancreatitis    Plan  Labs  Urine  UDS  CT A/P  IVF  zofran  Admission          Amount and/or Complexity of Data Reviewed  Clinical lab tests: ordered and reviewed  Tests in the radiology section of CPT®: ordered and reviewed  Review and summarize past medical records: yes        Disposition  Final diagnoses:   Nausea vomiting and diarrhea   RUQ pain   Transaminitis   Icterus   Jaundice   Duodenitis     Time reflects when diagnosis was documented in both MDM as applicable and the Disposition within this note     Time User Action Codes Description Comment    7/1/2019  9:03 PM Nader Dux [R11 2,  R19 7] Nausea vomiting and diarrhea     7/1/2019  9:04 PM Nader Dux [R10 11] RUQ pain     7/1/2019  9:04 PM Wyndmere Hiss Add [R74 0] Transaminitis     7/1/2019  9:04 PM Wyndmere Hiss Add [R17] Icterus     7/1/2019  9:04 PM Wyndmere Hiss Add [R17] Jaundice     7/1/2019  9:17 PM Nader Dux [K29 80] Duodenitis     7/1/2019  9:17 PM Clive Vishnu [R11 2,  R19 7] Nausea vomiting and diarrhea     7/1/2019  9:17 PM Wyndmere Hiss Modify [R17] Jaundice     7/1/2019  9:34 PM Grace He Add [F19 90] History of IV drug use       ED Disposition     ED Disposition Condition Date/Time Comment    Admit Stable Mon Jul 1, 2019  9:17 PM Case was discussed with MEGAN  and the patient's admission status was agreed to be Admission Status: inpatient status to the service of Dr Roberto Luke   Follow-up Information    None         Current Discharge Medication List      CONTINUE these medications which have NOT CHANGED    Details   buprenorphine-naloxone (SUBOXONE) 8-2 mg per SL tablet Place 1 tablet under the tongue 2 (two) times a day           No discharge procedures on file      ED Provider  Electronically Signed by           Missy Mack  07/01/19 6086

## 2019-07-01 NOTE — LETTER
[address]  Dept: [phone]    July 9, 2019     Patient: Kenyatta Tsai   YOB: 1980   Date of Visit: 7/1/2019       To Whom it May Concern:    Kenyatta Tsai is under my professional care  He was seen in the hospital from 7/1/2019   to 07/09/19  He may return to work on 07/11/2019 without limitations  If you have any questions or concerns, please don't hesitate to call           Sincerely,          Cecelia Traore MD

## 2019-07-02 ENCOUNTER — APPOINTMENT (INPATIENT)
Dept: ULTRASOUND IMAGING | Facility: HOSPITAL | Age: 39
DRG: 283 | End: 2019-07-02
Payer: COMMERCIAL

## 2019-07-02 LAB
ALBUMIN SERPL BCP-MCNC: 3 G/DL (ref 3.5–5)
ALP SERPL-CCNC: 178 U/L (ref 46–116)
ALT SERPL W P-5'-P-CCNC: 1495 U/L (ref 12–78)
ANION GAP SERPL CALCULATED.3IONS-SCNC: 9 MMOL/L (ref 4–13)
AST SERPL W P-5'-P-CCNC: 1693 U/L (ref 5–45)
BASOPHILS # BLD AUTO: 0.03 THOUSANDS/ΜL (ref 0–0.1)
BASOPHILS NFR BLD AUTO: 1 % (ref 0–1)
BILIRUB SERPL-MCNC: 10.03 MG/DL (ref 0.2–1)
BUN SERPL-MCNC: 11 MG/DL (ref 5–25)
CALCIUM SERPL-MCNC: 8.3 MG/DL (ref 8.3–10.1)
CHLORIDE SERPL-SCNC: 106 MMOL/L (ref 100–108)
CO2 SERPL-SCNC: 27 MMOL/L (ref 21–32)
CREAT SERPL-MCNC: 0.84 MG/DL (ref 0.6–1.3)
EOSINOPHIL # BLD AUTO: 0.17 THOUSAND/ΜL (ref 0–0.61)
EOSINOPHIL NFR BLD AUTO: 3 % (ref 0–6)
ERYTHROCYTE [DISTWIDTH] IN BLOOD BY AUTOMATED COUNT: 14.4 % (ref 11.6–15.1)
GFR SERPL CREATININE-BSD FRML MDRD: 113 ML/MIN/1.73SQ M
GLUCOSE SERPL-MCNC: 115 MG/DL (ref 65–140)
HAV IGM SER QL: ABNORMAL
HBV CORE AB SER QL: ABNORMAL
HBV CORE IGM SER QL: ABNORMAL
HBV CORE IGM SER QL: ABNORMAL
HBV SURFACE AG SER QL: ABNORMAL
HBV SURFACE AG SER QL: ABNORMAL
HCT VFR BLD AUTO: 40.7 % (ref 36.5–49.3)
HCV AB SER QL: ABNORMAL
HCV AB SER QL: ABNORMAL
HGB BLD-MCNC: 13.4 G/DL (ref 12–17)
HIV 1+2 AB+HIV1 P24 AG SERPL QL IA: NORMAL
IMM GRANULOCYTES # BLD AUTO: 0.01 THOUSAND/UL (ref 0–0.2)
IMM GRANULOCYTES NFR BLD AUTO: 0 % (ref 0–2)
LYMPHOCYTES # BLD AUTO: 1.93 THOUSANDS/ΜL (ref 0.6–4.47)
LYMPHOCYTES NFR BLD AUTO: 32 % (ref 14–44)
MCH RBC QN AUTO: 29 PG (ref 26.8–34.3)
MCHC RBC AUTO-ENTMCNC: 32.9 G/DL (ref 31.4–37.4)
MCV RBC AUTO: 88 FL (ref 82–98)
MONOCYTES # BLD AUTO: 0.72 THOUSAND/ΜL (ref 0.17–1.22)
MONOCYTES NFR BLD AUTO: 12 % (ref 4–12)
NEUTROPHILS # BLD AUTO: 3.14 THOUSANDS/ΜL (ref 1.85–7.62)
NEUTS SEG NFR BLD AUTO: 52 % (ref 43–75)
NRBC BLD AUTO-RTO: 0 /100 WBCS
PLATELET # BLD AUTO: 251 THOUSANDS/UL (ref 149–390)
PMV BLD AUTO: 13.2 FL (ref 8.9–12.7)
POTASSIUM SERPL-SCNC: 3.8 MMOL/L (ref 3.5–5.3)
PROT SERPL-MCNC: 6 G/DL (ref 6.4–8.2)
RBC # BLD AUTO: 4.62 MILLION/UL (ref 3.88–5.62)
SODIUM SERPL-SCNC: 142 MMOL/L (ref 136–145)
WBC # BLD AUTO: 6 THOUSAND/UL (ref 4.31–10.16)

## 2019-07-02 PROCEDURE — 76705 ECHO EXAM OF ABDOMEN: CPT

## 2019-07-02 PROCEDURE — 99232 SBSQ HOSP IP/OBS MODERATE 35: CPT | Performed by: HOSPITALIST

## 2019-07-02 PROCEDURE — 85025 COMPLETE CBC W/AUTO DIFF WBC: CPT | Performed by: PHYSICIAN ASSISTANT

## 2019-07-02 PROCEDURE — 80053 COMPREHEN METABOLIC PANEL: CPT | Performed by: PHYSICIAN ASSISTANT

## 2019-07-02 RX ORDER — DIPHENHYDRAMINE HYDROCHLORIDE 50 MG/ML
12.5 INJECTION INTRAMUSCULAR; INTRAVENOUS EVERY 6 HOURS PRN
Status: DISCONTINUED | OUTPATIENT
Start: 2019-07-02 | End: 2019-07-03

## 2019-07-02 RX ORDER — IBUPROFEN 400 MG/1
400 TABLET ORAL EVERY 6 HOURS PRN
Status: DISCONTINUED | OUTPATIENT
Start: 2019-07-02 | End: 2019-07-03

## 2019-07-02 RX ORDER — CHOLESTYRAMINE LIGHT 4 G/5.7G
4 POWDER, FOR SUSPENSION ORAL 2 TIMES DAILY
Status: DISCONTINUED | OUTPATIENT
Start: 2019-07-02 | End: 2019-07-09 | Stop reason: HOSPADM

## 2019-07-02 RX ADMIN — HEPARIN SODIUM 5000 UNITS: 5000 INJECTION INTRAVENOUS; SUBCUTANEOUS at 21:17

## 2019-07-02 RX ADMIN — DIPHENHYDRAMINE HYDROCHLORIDE 12.5 MG: 50 INJECTION, SOLUTION INTRAMUSCULAR; INTRAVENOUS at 21:16

## 2019-07-02 RX ADMIN — CHOLESTYRAMINE 4 G: 4 POWDER, FOR SUSPENSION ORAL at 17:28

## 2019-07-02 RX ADMIN — BUPRENORPHINE HYDROCHLORIDE, NALOXONE HYDROCHLORIDE 2 FILM: 2; .5 FILM, SOLUBLE BUCCAL; SUBLINGUAL at 17:28

## 2019-07-02 RX ADMIN — IBUPROFEN 400 MG: 400 TABLET ORAL at 21:52

## 2019-07-02 RX ADMIN — BUPRENORPHINE HYDROCHLORIDE, NALOXONE HYDROCHLORIDE 2 FILM: 2; .5 FILM, SOLUBLE BUCCAL; SUBLINGUAL at 08:42

## 2019-07-02 RX ADMIN — HEPARIN SODIUM 5000 UNITS: 5000 INJECTION INTRAVENOUS; SUBCUTANEOUS at 05:18

## 2019-07-02 RX ADMIN — HEPARIN SODIUM 5000 UNITS: 5000 INJECTION INTRAVENOUS; SUBCUTANEOUS at 13:00

## 2019-07-02 RX ADMIN — SODIUM CHLORIDE 75 ML/HR: 0.9 INJECTION, SOLUTION INTRAVENOUS at 21:56

## 2019-07-02 RX ADMIN — DIPHENHYDRAMINE HYDROCHLORIDE 12.5 MG: 50 INJECTION, SOLUTION INTRAMUSCULAR; INTRAVENOUS at 12:58

## 2019-07-02 NOTE — H&P (VIEW-ONLY)
Patient MRN: [id number]  Date of Service: 7/2/2019  Referring Physician: Dr Barrientos Seen  Provider Creating Note: DARON Bhatt  PCP: No primary care provider on file  Reason for Consult:  Transaminitis/jaundice  HPI  Vicky Bettencourt is a 41 y o  male who was admitted with Jaundice  He presented to the hospital chief complaint of epigastric pain left upper quadrant pain and right upper quadrant pain also noted to have itching all over his body and he noticed that his eyes were turning yellow  Upon admission he was noted have an AST of 1925 ALT of 1659, alkaline phosphatase of 212 and total bilirubin of 10 99  Patient has a history of IV drug abuse/heroine  Last use was 3 months ago  Patient currently is in a program with Suboxone  He is not currently using any IV drugs  He has no history of alcohol use  He has not taking any aspirin products  LFTs are starting to trend down  He has CT of the abdomen which showed normal liver however he has a small area of nonspecific inflammatory stranding densities in the mesenteric fat adjacent to the 2nd portion the duodenum and right lobe of the liver indicate inflammation questionable secondary to duodenitis was also noted to have 1 1 cm ovoid nodule along the wall of the 2nd portion of duodenum as well as a contracted gallbladder  CT also showed splenomegaly  He states that he had nausea and vomiting for the last 2 days as well as diarrhea  That has now resolved  Blood work showed positive hepatitis-C antibody  Patient states he was recently immunized for hepatitis-B with 3 injections  Past Medical History:   Diagnosis Date    Anxiety     Depression     GERD (gastroesophageal reflux disease)     Migraine     No known problems      Past Surgical History:   Procedure Laterality Date    NO PAST SURGERIES       Medications  Home Medications:   Prior to Admission medications    Medication Sig Start Date End Date Taking?  Authorizing Provider buprenorphine-naloxone (SUBOXONE) 8-2 mg per SL tablet Place 1 tablet under the tongue 2 (two) times a day   Yes Historical Provider, MD       Inhouse Medications    Current Facility-Administered Medications:     buprenorphine-naloxone (SUBOXONE) 2-0 5 mg per SL film 2 Film, 2 Film, Sublingual, BID, 2 Film at 07/02/19 0842    cholestyramine sugar free (QUESTRAN LIGHT) packet 4 g, 4 g, Oral, BID    heparin (porcine) subcutaneous injection 5,000 Units, 5,000 Units, Subcutaneous, Q8H [facility], 5,000 Units at 07/02/19 0518 **AND** [COMPLETED] Platelet count, , , Once    nicotine (NICODERM CQ) 7 mg/24hr TD 24 hr patch 1 patch, 1 patch, Transdermal, Daily    ondansetron (ZOFRAN) injection 4 mg, 4 mg, Intravenous, Q6H PRN    sodium chloride 0 9 % infusion, 75 mL/hr, Intravenous, Continuous, 75 mL/hr at 07/01/19 2239    Allergies  No Known Allergies  Social History   reports that he has been smoking cigarettes  He has been smoking about 0 50 packs per day  He has never used smokeless tobacco  He reports that he has current or past drug history  Drugs: Heroin and Prescription  He reports that he does not drink alcohol  Family History  History reviewed  No pertinent family history  ROS  ROS: Denies CP, SOB, fever, weight loss  Positive for itching all over his body, nausea and vomiting and diarrhea, jaundice, fatigue, upper abdominal pain  All others negative except as noted in HPI  Objective   Vitals  Blood pressure 106/58, pulse (!) 47, temperature (!) 97 1 °F (36 2 °C), temperature source Tympanic, resp  rate 18, weight 85 7 kg (188 lb 14 9 oz), SpO2 94 %  General: Alert, no apparent distress  Eyes:  Positive scleral icterus  ENT: MMM  Card: RRR no murmur  Lungs: Clear to ascultation b/l  No wheezes, rales, rhonchi  Abdomen: Soft  Positive epigastric right left upper quadrant abdominal tenderness no rebound tenderness or guarding  Nondistended  Bowel sounds present and normoactive     Skin:  Positive jaundice  Neuro: Alert and oriented x3        Laboratory Studies  Lab Results   Component Value Date    WBC 6 00 07/02/2019    HGB 13 4 07/02/2019    HCT 40 7 07/02/2019     07/02/2019    MCV 88 07/02/2019     Lab Results   Component Value Date    CREATININE 0 84 07/02/2019    BUN 11 07/02/2019    SODIUM 142 07/02/2019    K 3 8 07/02/2019     07/02/2019    CO2 27 07/02/2019    GLUCOSE 92 08/19/2015    CALCIUM 8 3 07/02/2019    PROT 7 4 08/19/2015    ALKPHOS 178 (H) 07/02/2019    BILITOT 0 32 08/19/2015    AST 1,693 (H) 07/02/2019    ALT 1,495 (H) 07/02/2019     Lab Results   Component Value Date    PROTIME 13 0 07/01/2019    INR 0 97 07/01/2019       Imaging and Other Studies      Assessment and Plan:  1  Markedly elevated AST ALT and total bilirubin  with whole  body pruritis in patient with history of IV drug use  Hepatitis-C antibody positive  Will check hepatitis-C viral load and genotype  Check LFTs in a m  2  CT scan showing abnormality - duodenum  Patient will need EGD eventually  Will  schedule MRI/MRCP  3   Splenomegaly concerning for undiagnosed a cirrhosis  PT/ INR is within normal limits and total albumin is slightly decreased at 3  Ultrasound pending      Principal Problem:    Jaundice  Active Problems:    Transaminitis    Gastroenteritis    History of IV drug use    Splenomegaly    Abnormal CT scan, small bowel      Jazmin Muff, CRNP

## 2019-07-02 NOTE — ASSESSMENT & PLAN NOTE
Jaundice which is felt to be likely secondary to viral hepatitis  CT AP demonstrates a contracted gallbladder without any evidence of pericholecystic fat stranding or edema  There is no overt CBD dilatation  Will check direct bilirubin    Will check right upper quadrant ultrasound to assess for cirrhosis given splenomegaly as well as for any CBD dilatation to suggest stone passage or obstruction  For now monitor off antibiotics

## 2019-07-02 NOTE — ASSESSMENT & PLAN NOTE
CT AP with contrast demonstrates 1 1 cm ovoid nodule along the wall of the 2nd portion of the duodenum may represent a collapsed duodenal diverticulum  However a focal segment of duodenitis/inflammation is in the differential diagnosis as is a mural nodule  given concern for nausea vomiting diarrhea considered this a component of gastroenteritis    Consult GI for findings as there is also concern for malignancy

## 2019-07-02 NOTE — H&P
H&P- Vicky Bettencourt 1980, 41 y o  male MRN: [id number]    Unit/Bed#: E2 -01 Encounter: [id number]    Primary Care Provider: No primary care provider on file  Date and time admitted to hospital: 7/1/2019  6:57 PM    History and Physical - [facility]    Patient Information: Welford Pencil 41 y o  male MRN: [id number]  Unit/Bed#: [facility] 240-01 Encounter: [id number]  Admitting Physician: Adelita Oshea PA-C  PCP: No primary care provider on file  Date of Admission:  07/01/19    Assessment/Plan:    Hospital Problem List:     Principal Problem:    Jaundice  Active Problems:    Transaminitis    Gastroenteritis    History of IV drug use    Splenomegaly    Abnormal CT scan, small bowel      * Jaundice  Assessment & Plan  Jaundice which is felt to be likely secondary to viral hepatitis  CT AP demonstrates a contracted gallbladder without any evidence of pericholecystic fat stranding or edema  There is no overt CBD dilatation  Will check direct bilirubin  Will check right upper quadrant ultrasound to assess for cirrhosis given splenomegaly as well as for any CBD dilatation to suggest stone passage or obstruction  For now monitor off antibiotics    Transaminitis  Assessment & Plan  Rony and AST and ALT elevation of 1900 and 1,600 respectively  Given history of IVDA there is high concern for viral hepatitis  CT AP w/ contrast does not demonstrate any choledocholithiasis any gallbladder wall thickening or pericholecystic fluid or edema or CBD dilatation  Given continent splenomegaly there is concern for possibly undiagnosed cirrhosis  Coags are within normal limits there is no encephalopathy to suggest hepatic evidence of encephalopathy or fulminant hepatitis although patient certainly is at high risk of both    Monitor CMP daily, consult GI, follow-up acute and chronic hepatitis panel as ordered by the ED provider      Abnormal CT scan, small bowel  Assessment & Plan  CT AP with contrast demonstrates 1 1 cm ovoid nodule along the wall of the 2nd portion of the duodenum may represent a collapsed duodenal diverticulum  However a focal segment of duodenitis/inflammation is in the differential diagnosis as is a mural nodule  given concern for nausea vomiting diarrhea considered this a component of gastroenteritis  Consult GI for findings as there is also concern for malignancy    Splenomegaly  Assessment & Plan  Measured at 14 2 cm by CT AP  No other evidence to suggest portal hypertension  F/u RUQ u/s    History of IV drug use  Assessment & Plan  Patient reports that he has been clean for the last 3 months his urine drug screen supports this  Given significant transaminitis with T bili elevation agree with concern for viral hepatitis    reviewed with confirm dosing of buprenorphine/naloxone 8-2 mg bid SL BID  D/w pt who is amenable to dose reduction by 50% to 4-1mg BID SL  Discussed with patient for checking HIV status as well who is amenable    Gastroenteritis  Assessment & Plan  Maybe 2* hepatitis vs gastroenteritis  Start IV fluid hydration with supportive care with antiemetics          VTE Prophylaxis: Heparin  / sequential compression device   Code Status: fc  POLST: There is no POLST form on file for this patient (pre-hospital)    Anticipated Length of Stay:  Patient will be admitted on an Inpatient basis with an anticipated length of stay of  Greater than 2 midnights  Justification for Hospital Stay: transaminitis/jaundice    Total Time for Visit, including Counseling / Coordination of Care: 45 minutes  Greater than 50% of this total time spent on direct patient counseling and coordination of care  Chief Complaint:   One month of pruritus without rash, 2-3 days of n/v/d and yellowing of skin/eyes        History of Present Illness:    Juliano Maya is a 41 y o  male Patient is a pleasant 45-year-old male w/pmh of gastritis, history of IVDA as well as insufflation with fentanyl, heroin and intermittent cocaine use  He has been clean 3 mo  It has been on his Suboxone dose last 2 months without any dose changes  He noticed 1 month prior to admission he started to have generalized pruritus not alleviated to any topical over-the-counter creams  One month prior to admission he did see a provider in the [place] [address] ER and received a dose of Atarax with minimal improvement  He notes over the last 3-4 days he has been having increasing nausea with 1-2 episodes of emesis over the last 4 days and anorexia  He has had diarrhea 4-5 times at least today which is loose to watery without any black stools or bright red blood or Tan coloration  He has chills but no fevers  He reports that he started to notice his skin yellowing as well as size yellowing which brought him to the ER to be evaluated  Notably he reports that he has had intermittent nausea and vomiting over the last month but not more than 1-2x/week  He has had anorexia over last month w/about 35 lb weight loss but no night sweats  He has had chronic epigastric pain and RUQ pain worse in AM   This is stable and does not radiate  He feel this is due to his hx of gastritis as he has been previously told to avoid spicy foods, chocolate/caffeine  He reports this is stable  He smokes 1 ppd  He rarely uses alcohol 1-2x/year  In ed evaluation he was found to have transaminitis w/o coagulopathy or encephalopathy  Ct a/p did not demonstrate overt reason for obstructive jaundice from biliary source  We are asked to admit the pt for jaundice/transaminitis concerning for possible viral hepatitis  Review of Systems:    Review of Systems   Constitutional: Positive for appetite change, chills, fatigue and unexpected weight change  Negative for fever  Respiratory: Negative for shortness of breath  Cardiovascular: Negative for chest pain and palpitations     Gastrointestinal: Positive for abdominal pain, diarrhea and vomiting  Skin: Negative for rash  Neurological: Positive for light-headedness  All other systems reviewed and are negative  Past Medical and Surgical History:     Past Medical History:   Diagnosis Date    Anxiety     Depression     GERD (gastroesophageal reflux disease)     Migraine     No known problems        Past Surgical History:   Procedure Laterality Date    NO PAST SURGERIES         Meds/Allergies:    Prior to Admission medications    Medication Sig Start Date End Date Taking? Authorizing Provider   buprenorphine-naloxone (SUBOXONE) 8-2 mg per SL tablet Place 1 tablet under the tongue 2 (two) times a day   Yes Historical Provider, MD   hydrOXYzine HCL (ATARAX) 25 mg tablet Take 1 tablet (25 mg total) by mouth every 6 (six) hours 6/2/19 7/1/19  Shabana Jauregui PA-C   ibuprofen (MOTRIN) 800 mg tablet Take 1 tablet (800 mg total) by mouth every 8 (eight) hours as needed for mild pain or moderate pain for up to 10 days 6/22/18 7/1/19  Tracy Tran DO     I have reviewed home medications with patient personally  Allergies: No Known Allergies    Social History:     Marital Status: Significant Other   Occupation:   Patient Pre-hospital Living Situation:   Patient Pre-hospital Level of Mobility:   Patient Pre-hospital Diet Restrictions:   Substance Use History:   Social History     Substance and Sexual Activity   Alcohol Use No     Social History     Tobacco Use   Smoking Status Current Every Day Smoker    Packs/day: 0 50    Types: Cigarettes   Smokeless Tobacco Never Used     Social History     Substance and Sexual Activity   Drug Use Yes    Types: Heroin, Prescription    Comment:  suboxen       Family History:  History reviewed  No pertinent family history       Physical Exam:     Vitals:   Blood Pressure: 112/58 (07/01/19 2059)  Pulse: (!) 51 (07/01/19 2059)  Temperature: 99 6 °F (37 6 °C) (07/01/19 1856)  Temp Source: Temporal (07/01/19 1856)  Respirations: 20 (07/01/19 2059)  Weight - Scale: 85 7 kg (188 lb 14 9 oz) (07/01/19 1853)  SpO2: 96 % (07/01/19 2059)    Physical Exam   Constitutional: He appears well-developed  Appears stated age  Ill jaundiced  HENT:   Head: Normocephalic and atraumatic  Right Ear: External ear normal    Left Ear: External ear normal    Soft palate and sublingual soft tissue is icteric   Eyes: Scleral icterus is present  Neck: Normal range of motion  Cardiovascular: Normal rate, regular rhythm, normal heart sounds and intact distal pulses  Exam reveals no gallop and no friction rub  No murmur heard  Pulmonary/Chest: Effort normal and breath sounds normal  No stridor  No respiratory distress  He has no wheezes  He has no rales  Abdominal: Soft  He exhibits no distension and no mass  There is tenderness (TTP in epigastrum and RUQ  there is + alvares's sign)  There is no rebound and no guarding  Musculoskeletal: He exhibits no edema  Neurological: He is alert  Skin: Skin is warm and dry  No rash noted  He is not diaphoretic  Psychiatric: He has a normal mood and affect  Vitals reviewed  (  Be Sure to Include Physical Exam: Delete this entire line when you have entered your exam)    Additional Data:     Lab Results: I have personally reviewed pertinent reports  Results from last 7 days   Lab Units 07/01/19  1925   WBC Thousand/uL 6 77   HEMOGLOBIN g/dL 14 8   HEMATOCRIT % 43 9   PLATELETS Thousands/uL 277   NEUTROS PCT % 59   LYMPHS PCT % 23   MONOS PCT % 14*   EOS PCT % 3     Results from last 7 days   Lab Units 07/01/19  1925   POTASSIUM mmol/L 3 7   CHLORIDE mmol/L 103   CO2 mmol/L 26   BUN mg/dL 13   CREATININE mg/dL 0 82   CALCIUM mg/dL 9 2   ALK PHOS U/L 212*   ALT U/L 1,659*   AST U/L 1,925*     Results from last 7 days   Lab Units 07/01/19  1925   INR  0 94       Imaging: I have personally reviewed pertinent reports        Ct Abdomen Pelvis With Contrast    Result Date: 7/1/2019  Narrative: CT ABDOMEN AND PELVIS WITH IV CONTRAST INDICATION:   icterus, pruritis, jaundice   R/O liver disease,   icterus, pruritis, jaundice   R/O liver disease,   mid abd pain COMPARISON:  9/7/2016 TECHNIQUE:  CT examination of the abdomen and pelvis was performed  Axial, sagittal, and coronal 2D reformatted images were created from the source data and submitted for interpretation  Radiation dose length product (DLP) for this visit:  382 mGy-cm   This examination, like all CT scans performed in the [facility], was performed utilizing techniques to minimize radiation dose exposure, including the use of iterative reconstruction and automated exposure control  IV Contrast:  100 mL of iohexol (OMNIPAQUE) Enteric Contrast:  Enteric contrast was not administered  FINDINGS: ABDOMEN LOWER CHEST:  Atelectatic changes are noted at the lung bases  LIVER/BILIARY TREE:  Unremarkable  GALLBLADDER:  The gallbladder is somewhat contracted  SPLEEN:  The spleen is enlarged, measuring  14 2 cm  PANCREAS:  Unremarkable  ADRENAL GLANDS:  Unremarkable  KIDNEYS/URETERS:  One or more sharply circumscribed subcentimeter renal hypodensities are noted  These lesions are too small to accurately characterize, but are statistically most likely to represent benign cortical renal cyst(s)  According to the guidelines published in the [facility] Paper of the ACR Incidental Findings Committee (Radiology 2010), no further workup of these lesions is recommended  STOMACH AND BOWEL:  Adjacent to the 2nd portion of the duodenum is abnormal strandy densities without extraluminal gas or collection, well seen on image 34, series 2  These strandy densities about the medial margin of the right lobe of liver but are thought to be duodenal in origin  There is a ovoid 1 1 cm hyperdensity along the wall of the 2nd portion the duodenum on image 33, series 2, correlating to finding on image 64, series 601    While this may represent a duodenal diverticulum which is somewhat collapsed or a adjacent mesenteric lymph node, focal inflammation of the wall the duodenum versus mural mass lesion are in the differential diagnosis  No pneumatosis or extraluminal gas or fluid  APPENDIX:  No findings to suggest appendicitis  ABDOMINOPELVIC CAVITY:  No ascites or free intraperitoneal air  No lymphadenopathy  VESSELS:  Unremarkable for patient's age  PELVIS REPRODUCTIVE ORGANS:  Unremarkable for patient's age  URINARY BLADDER:  Unremarkable  ABDOMINAL WALL/INGUINAL REGIONS:  Unremarkable  OSSEOUS STRUCTURES:  No acute fracture or destructive osseous lesion  Impression: 1  Small area of nonspecific inflammatory strandy densities in the mesenteric fat adjacent to the 2nd portion of the duodenum and right lobe of the liver, indicative of inflammation perhaps secondary to duodenitis  Further clinical assessment recommended  2   1 1 cm ovoid nodule along the wall of the 2nd portion of the duodenum may represent a collapsed duodenal diverticulum  However a focal segment of duodenitis/inflammation is in the differential diagnosis as is a mural nodule  Recommend endoscopic evaluation perhaps in 2-4 weeks after the inflammatory phase of the duodenitis resolves  Further clinical assessment recommended  3   Somewhat contracted gallbladder  If symptoms are referrable to the gallbladder, consider ultrasound after a 6 hour fast  4   Normal-appearing appendix  Workstation performed: [id number]       EKG, Pathology, and Other Studies Reviewed on Admission:   · EKG:     Allscripts / Epic Records Reviewed: Yes     ** Please Note: This note has been constructed using a voice recognition system   **

## 2019-07-02 NOTE — PROGRESS NOTES
Progress Note - Vicky Lopezallen 1980, 41 y o  male MRN: [id number]    Unit/Bed#: E2 -01 Encounter: [id number]    Primary Care Provider: No primary care provider on file  Date and time admitted to hospital: 2019  6:57 PM        * Jaundice  Assessment & Plan  Secondary to acute hepatitis  Appreciate GI help  Hepatitis C is positive    History of IV drug use  Assessment & Plan  Patient reports that he has been clean for the last 3 months his urine drug screen supports this  Given significant transaminitis with T bili elevation agree with concern for viral hepatitis    reviewed with confirm dosing of buprenorphine/naloxone 8-2 mg bid SL BID  D/w pt who is amenable to dose reduction by 50% to 4-1mg BID SL  Discussed with patient for checking HIV status as well who is amenable    Transaminitis  Assessment & Plan  Acute hepatitis with history of IV Drug abuse  Hepatitis C positive            Subjective:   Complains of itching all over  No improvement with benadryl  Mild RUQ abd pain  No fever  Objective:     Vitals:   Temp (24hrs), Av 9 °F (36 6 °C), Min:97 °F (36 1 °C), Max:99 6 °F (37 6 °C)    Temp:  [97 °F (36 1 °C)-99 6 °F (37 6 °C)] 97 1 °F (36 2 °C)  HR:  [47-90] 47  Resp:  [16-20] 18  BP: (106-125)/(53-74) 106/58  SpO2:  [94 %-98 %] 94 %  Body mass index is 28 73 kg/m²  Input and Output Summary (last 24 hours): Intake/Output Summary (Last 24 hours) at 2019 1141  Last data filed at 2019 2125  Gross per 24 hour   Intake 1000 ml   Output --   Net 1000 ml       Physical Exam:     Physical Exam   HENT:   Head: Normocephalic and atraumatic  Eyes: Pupils are equal, round, and reactive to light  EOM are normal  Scleral icterus is present  Cardiovascular: Normal rate and regular rhythm  Exam reveals no gallop and no friction rub  No murmur heard  Pulmonary/Chest: Effort normal and breath sounds normal  He has no wheezes  He has no rales  Abdominal: Soft   Bowel sounds are normal  Tenderness: milld tenderness to palpation of RUQ  Musculoskeletal: He exhibits no edema  Nursing note and vitals reviewed          Additional Data:     Labs:    Results from last 7 days   Lab Units 07/02/19  0425   WBC Thousand/uL 6 00   HEMOGLOBIN g/dL 13 4   HEMATOCRIT % 40 7   PLATELETS Thousands/uL 251   NEUTROS PCT % 52   LYMPHS PCT % 32   MONOS PCT % 12   EOS PCT % 3     Results from last 7 days   Lab Units 07/02/19  0425   POTASSIUM mmol/L 3 8   CHLORIDE mmol/L 106   CO2 mmol/L 27   BUN mg/dL 11   CREATININE mg/dL 0 84   CALCIUM mg/dL 8 3   ALK PHOS U/L 178*   ALT U/L 1,495*   AST U/L 1,693*     Results from last 7 days   Lab Units 07/01/19  2257   INR  0 97                   * I Have Reviewed All Lab Data     Recent Cultures (last 7 days):             Last 24 Hours Medication List:     Current Facility-Administered Medications:  buprenorphine-naloxone 2 Film Sublingual BID Katya Pearson PA-C    cholestyramine sugar free 4 g Oral BID DARON Bernardo    heparin (porcine) 5,000 Units Subcutaneous [facility] Katya Pearson PA-C    nicotine 1 patch Transdermal Daily Katya Pearson PA-C    ondansetron 4 mg Intravenous Q6H PRN Katya Pearson PA-C    sodium chloride 75 mL/hr Intravenous Continuous Katya Pearson PA-C Last Rate: 75 mL/hr (07/01/19 2239)         VTE Pharmacologic Prophylaxis:   Pharmacologic: Heparin      Current Length of Stay: 1 day(s)    Current Patient Status: Inpatient       Discharge Plan:   Code Status: Level 1 - Full Code           Today, Patient Was Seen By: Kimberly Stevens DO    ** Please Note: Dictation voice to text software may have been used in the creation of this document   **

## 2019-07-02 NOTE — UTILIZATION REVIEW
Notification of Inpatient Admission/Inpatient Authorization Request  This is a Notification of Inpatient Admission/Request for Inpatient Authorization for our facility [address]  Be advised that this patient was admitted to our facility under Inpatient Status  Please contact the Utilization Review Department where the patient is receiving care services for additional admission information  Place of Service Code: 24   Place of Service Name: Inpatient Hospital  Presentation Date & Time: 7/1/2019  6:57 PM  Inpatient Admission Date & Time: 7/1/19 2118  Discharge Date & Time: No discharge date for patient encounter  Discharge Disposition (if discharged): Home/Self Care  Attending Physician: Alejandrina Santana [[id number]]  Admission Orders (From admission, onward)    Ordered        07/01/19 2118  Inpatient Admission (expected length of stay for this patient Order details is greater than two midnights)  Once             Facility: [facility]  Utilization Review Department  Phone: [phone]; Fax [phone]  [email or web address]  ATTENTION: Please call with any questions or concerns to [phone]  and carefully listen to the prompts so that you are directed to the right person  Send all requests for admission clinical reviews, approved or denied determinations and any other requests to fax [phone]   All voicemails are confidential

## 2019-07-02 NOTE — ASSESSMENT & PLAN NOTE
Rony and AST and ALT elevation of 1900 and 1,600 respectively  Given history of IVDA there is high concern for viral hepatitis  CT AP w/ contrast does not demonstrate any choledocholithiasis any gallbladder wall thickening or pericholecystic fluid or edema or CBD dilatation  Given continent splenomegaly there is concern for possibly undiagnosed cirrhosis  Coags are within normal limits there is no encephalopathy to suggest hepatic evidence of encephalopathy or fulminant hepatitis although patient certainly is at high risk of both    Monitor CMP daily, consult GI, follow-up acute and chronic hepatitis panel as ordered by the ED provider

## 2019-07-02 NOTE — UTILIZATION REVIEW
Initial Clinical Review    Admission: Date/Time/Statement: 7/1/19 @ 2118     Orders Placed This Encounter   Procedures    Inpatient Admission (expected length of stay for this patient Order details is greater than two midnights)     Standing Status:   Standing     Number of Occurrences:   1     Order Specific Question:   Admitting Physician     Answer:   Colonel Greco [[id number]]     Order Specific Question:   Level of Care     Answer:   Med Surg [[id number]]     Order Specific Question:   Estimated length of stay     Answer:   More than 2 Midnights     Order Specific Question:   Certification     Answer:   I certify that inpatient services are medically necessary for this patient for a duration of greater than two midnights  See H&P and MD Progress Notes for additional information about the patient's course of treatment  ED Arrival Information     Expected Arrival Acuity Means of Arrival Escorted By Service Admission Type    - 7/1/2019 18:50 Urgent Walk-In Self Hospitalist Urgent    Arrival Complaint    -        Chief Complaint   Patient presents with    Vomiting     vomiting, diarrhea, abd pain itching for "days" states itching [id number]  also reprots yellow skin  Assessment/Plan:    41  Y O  Male  Presents to ED  From home  With  Increasing  Nausea, vomiting  And anorexia  For past 4 days  Had  4-5  Episodes of  Diarrhea   The   Day of adm  Has  Noticed   Skin turning yellow, which  Prompted  Ed visit  Has  Had  approx  a   35  Lb  Weight loss in the past month  Has  Chronic  Epigastric  Pain  Pt has  Along  H/O gastritis,  IVDA, has been clean for  abt  3 mos and is on suboxone, LD  2 mos  Ago  In ED, found with transaminitis  W/o  Encephalopathy  ADMIT  IP  With  Jaundice, transaminitis and poss viral  Hepatitis a nd plan is    GI consult, monitor  Labs,  IVF    And  Diagnostics  PE:     Appears stated age  Ill jaundiced     Soft palate and sublingual soft tissue is icteric   Eyes: Scleral icterus is present  Abdominal: Soft  He exhibits no distension and no mass  There is tenderness (TTP in epigastrum and RUQ  there is + alvares's sign)  There is no rebound and no guarding  Per  GI Consult:       Markedly elevated AST ALT and total bilirubin  with whole  body pruritis in patient with history of IV drug use  Hepatitis-C antibody positive  Will check hepatitis-C viral load and genotype  Check LFTs in a m  2  CT scan showing abnormality - duodenum  Patient will need EGD eventually  Will  schedule MRI/MRCP  3   Splenomegaly concerning for undiagnosed a cirrhosis  PT/ INR is within normal limits and total albumin is slightly decreased at 3  Ultrasound pending  ED Triage Vitals   Temperature Pulse Respirations Blood Pressure SpO2   07/01/19 1856 07/01/19 1856 07/01/19 1856 07/01/19 1856 07/01/19 1856   99 6 °F (37 6 °C) 90 16 125/74 98 %      Temp Source Heart Rate Source Patient Position - Orthostatic VS BP Location FiO2 (%)   07/01/19 1856 07/01/19 2059 07/01/19 1856 07/01/19 1856 --   Temporal Monitor Sitting Right arm       Pain Score       07/01/19 1856       7        Wt Readings from Last 1 Encounters:   07/01/19 85 7 kg (188 lb 14 9 oz)     Additional Vital Signs:   /02/19 0722  97 1 °F (36 2 °C)Abnormal   47Abnormal   18  106/58  94 %  None (Room air)  Lying   07/01/19 2226  97 °F (36 1 °C)Abnormal   52Abnormal   20  108/53  94 %  None (Room air)  Lying   07/01/19 2059  --  51Abnormal   20  112/58  96 %  None (Room air)  Sitting   07/01/19 1856  99 6 °F (37 6 °C)  90  16  125/74  98 %  None (Room air)           Pertinent Labs/Diagnostic Test Results:     Ct abd/pelvis  (  7/1)    Small area of nonspecific inflammatory strandy densities in the mesenteric fat adjacent to the 2nd portion of the duodenum and right lobe of the liver, indicative of inflammation perhaps secondary to duodenitis  Further clinical assessment recommended   2   1 1 cm ovoid nodule along the wall of the 2nd portion of the duodenum may represent a collapsed duodenal diverticulum  However a focal segment of duodenitis/inflammation is in the differential diagnosis as is a mural nodule  Recommend endoscopic evaluation perhaps in 2-4 weeks after the inflammatory phase of the duodenitis resolves  Further clinical assessment recommended  3   Somewhat contracted gallbladder  If symptoms are referrable to the gallbladder, consider ultrasound after a 6 hour fast  4   Normal-appearing appendix    Results from last 7 days   Lab Units 07/02/19  0425 07/01/19 2257 07/01/19 1925   WBC Thousand/uL 6 00  --  6 77   HEMOGLOBIN g/dL 13 4  --  14 8   HEMATOCRIT % 40 7  --  43 9   PLATELETS Thousands/uL 251 252 277   NEUTROS ABS Thousands/µL 3 14  --  3 99         Results from last 7 days   Lab Units 07/02/19  0425 07/01/19  1925   SODIUM mmol/L 142 141   POTASSIUM mmol/L 3 8 3 7   CHLORIDE mmol/L 106 103   CO2 mmol/L 27 26   ANION GAP mmol/L 9 12   BUN mg/dL 11 13   CREATININE mg/dL 0 84 0 82   EGFR ml/min/1 73sq m 113 114   CALCIUM mg/dL 8 3 9 2   MAGNESIUM mg/dL  --  1 9     Results from last 7 days   Lab Units 07/02/19  0425 07/01/19 1925   AST U/L 1,693* 1,925*   ALT U/L 1,495* 1,659*   ALK PHOS U/L 178* 212*   TOTAL PROTEIN g/dL 6 0* 7 1   ALBUMIN g/dL 3 0* 3 5   TOTAL BILIRUBIN mg/dL 10 03* 10 99*   BILIRUBIN DIRECT mg/dL  --  9 87*         Results from last 7 days   Lab Units 07/02/19  0425 07/01/19 1925   GLUCOSE RANDOM mg/dL 115 117                     Results from last 7 days   Lab Units 07/01/19  1925   CK TOTAL U/L 33*             Results from last 7 days   Lab Units 07/01/19  2257 07/01/19  1925   PROTIME seconds 13 0 12 7   INR  0 97 0 94   PTT seconds 35 34         Results from last 7 days   Lab Units 07/01/19  1925   LACTIC ACID mmol/L 0 7                 Results from last 7 days   Lab Units 07/01/19 1925   HEP B S AG  Non-reactive  Non-reactive   HEP C AB  High Reactive*  High Reactive*   HEP B C IGM  Non-reactive Non-reactive   HEP B C TOTAL AB  Non-reactive     Results from last 7 days   Lab Units 07/01/19  1925   LIPASE u/L 57*             Results from last 7 days   Lab Units 07/01/19 2020   CLARITY UA  Clear   COLOR UA  Brown   SPEC GRAV UA  1 020   PH UA  7 0   GLUCOSE UA mg/dl Negative   KETONES UA mg/dl Trace*   BLOOD UA  Negative   PROTEIN UA mg/dl 30 (1+)*   NITRITE UA  Negative   BILIRUBIN UA  Interference- unable to analyze*   UROBILINOGEN UA E U /dl 1 0   LEUKOCYTES UA  Negative   WBC UA /hpf 0-1*   RBC UA /hpf None Seen   BACTERIA UA /hpf None Seen   EPITHELIAL CELLS WET PREP /hpf Occasional         Results from last 7 days   Lab Units 07/01/19 2010   AMPH/METH  Negative   BARBITURATE UR  Negative   BENZODIAZEPINE UR  Negative   COCAINE UR  Negative   METHADONE URINE  Negative   OPIATE UR  Negative   PCP UR  Negative   THC UR  Negative         ED Treatment:   Medication Administration from 07/01/2019 1850 to 07/01/2019 2148       Date/Time Order Dose Route Comments     07/01/2019 2125 sodium chloride 0 9 % bolus 1,000 mL 0 mL Intravenous      07/01/2019 1934 sodium chloride 0 9 % bolus 1,000 mL 1,000 mL Intravenous      07/01/2019 1934 ondansetron (ZOFRAN) injection 4 mg 4 mg Intravenous      07/01/2019 2045 iohexol (OMNIPAQUE) 350 MG/ML injection (MULTI-DOSE) 100 mL 100 mL Intravenous           Present on Admission:  **None**      Admitting Diagnosis: Icterus [R17]  Vomiting [R11 10]  Jaundice [R17]  RUQ pain [R10 11]  Transaminitis [R74 0]  IV drug user [F19 90]  Nausea vomiting and diarrhea [R11 2, R19 7]  Duodenitis [K29 80]  Age/Sex: 41 y o  male  Admission Orders:    Current Facility-Administered Medications:  buprenorphine-naloxone 2 Film Sublingual BID    cholestyramine sugar free 4 g Oral BID    heparin (porcine) 5,000 Units Subcutaneous Q8H [address]    nicotine 1 patch Transdermal Daily    ondansetron 4 mg Intravenous Q6H PRN    sodium chloride 75 mL/hr Intravenous Continuous Last Rate: 75 mL/hr (07/01/19 8963)       [facility] Utilization Review Department  Phone: [phone]; Fax [phone]  [email or web address]  ATTENTION: Please call with any questions or concerns to [phone]  and carefully listen to the prompts so that you are directed to the right person  Send all requests for admission clinical reviews, approved or denied determinations and any other requests to fax [phone]   All voicemails are confidential

## 2019-07-02 NOTE — CONSULTS
Patient MRN: [id number]  Date of Service: 7/2/2019  Referring Physician: Dr Rekha Ramos  Provider Creating Note: DARON Houston  PCP: No primary care provider on file  Reason for Consult:  Transaminitis/jaundice  HPI  Vicky Bettencourt is a 41 y o  male who was admitted with Jaundice  He presented to the hospital chief complaint of epigastric pain left upper quadrant pain and right upper quadrant pain also noted to have itching all over his body and he noticed that his eyes were turning yellow  Upon admission he was noted have an AST of 1925 ALT of 1659, alkaline phosphatase of 212 and total bilirubin of 10 99  Patient has a history of IV drug abuse/heroine  Last use was 3 months ago  Patient currently is in a program with Suboxone  He is not currently using any IV drugs  He has no history of alcohol use  He has not taking any aspirin products  LFTs are starting to trend down  He has CT of the abdomen which showed normal liver however he has a small area of nonspecific inflammatory stranding densities in the mesenteric fat adjacent to the 2nd portion the duodenum and right lobe of the liver indicate inflammation questionable secondary to duodenitis was also noted to have 1 1 cm ovoid nodule along the wall of the 2nd portion of duodenum as well as a contracted gallbladder  CT also showed splenomegaly  He states that he had nausea and vomiting for the last 2 days as well as diarrhea  That has now resolved  Blood work showed positive hepatitis-C antibody  Patient states he was recently immunized for hepatitis-B with 3 injections  Past Medical History:   Diagnosis Date    Anxiety     Depression     GERD (gastroesophageal reflux disease)     Migraine     No known problems      Past Surgical History:   Procedure Laterality Date    NO PAST SURGERIES       Medications  Home Medications:   Prior to Admission medications    Medication Sig Start Date End Date Taking?  Authorizing Provider buprenorphine-naloxone (SUBOXONE) 8-2 mg per SL tablet Place 1 tablet under the tongue 2 (two) times a day   Yes Historical Provider, MD       Inhouse Medications    Current Facility-Administered Medications:     buprenorphine-naloxone (SUBOXONE) 2-0 5 mg per SL film 2 Film, 2 Film, Sublingual, BID, 2 Film at 07/02/19 0842    cholestyramine sugar free (QUESTRAN LIGHT) packet 4 g, 4 g, Oral, BID    heparin (porcine) subcutaneous injection 5,000 Units, 5,000 Units, Subcutaneous, Q8H [address], 5,000 Units at 07/02/19 0518 **AND** [COMPLETED] Platelet count, , , Once    nicotine (NICODERM CQ) 7 mg/24hr TD 24 hr patch 1 patch, 1 patch, Transdermal, Daily    ondansetron (ZOFRAN) injection 4 mg, 4 mg, Intravenous, Q6H PRN    sodium chloride 0 9 % infusion, 75 mL/hr, Intravenous, Continuous, 75 mL/hr at 07/01/19 2239    Allergies  No Known Allergies  Social History   reports that he has been smoking cigarettes  He has been smoking about 0 50 packs per day  He has never used smokeless tobacco  He reports that he has current or past drug history  Drugs: Heroin and Prescription  He reports that he does not drink alcohol  Family History  History reviewed  No pertinent family history  ROS  ROS: Denies CP, SOB, fever, weight loss  Positive for itching all over his body, nausea and vomiting and diarrhea, jaundice, fatigue, upper abdominal pain  All others negative except as noted in HPI  Objective   Vitals  Blood pressure 106/58, pulse (!) 47, temperature (!) 97 1 °F (36 2 °C), temperature source Tympanic, resp  rate 18, weight 85 7 kg (188 lb 14 9 oz), SpO2 94 %  General: Alert, no apparent distress  Eyes:  Positive scleral icterus  ENT: MMM  Card: RRR no murmur  Lungs: Clear to ascultation b/l  No wheezes, rales, rhonchi  Abdomen: Soft  Positive epigastric right left upper quadrant abdominal tenderness no rebound tenderness or guarding  Nondistended  Bowel sounds present and normoactive     Skin:  Positive jaundice  Neuro: Alert and oriented x3        Laboratory Studies  Lab Results   Component Value Date    WBC 6 00 07/02/2019    HGB 13 4 07/02/2019    HCT 40 7 07/02/2019     07/02/2019    MCV 88 07/02/2019     Lab Results   Component Value Date    CREATININE 0 84 07/02/2019    BUN 11 07/02/2019    SODIUM 142 07/02/2019    K 3 8 07/02/2019     07/02/2019    CO2 27 07/02/2019    GLUCOSE 92 08/19/2015    CALCIUM 8 3 07/02/2019    PROT 7 4 08/19/2015    ALKPHOS 178 (H) 07/02/2019    BILITOT 0 32 08/19/2015    AST 1,693 (H) 07/02/2019    ALT 1,495 (H) 07/02/2019     Lab Results   Component Value Date    PROTIME 13 0 07/01/2019    INR 0 97 07/01/2019       Imaging and Other Studies      Assessment and Plan:  1  Markedly elevated AST ALT and total bilirubin  with whole  body pruritis in patient with history of IV drug use  Hepatitis-C antibody positive  Will check hepatitis-C viral load and genotype  Check LFTs in a m  2  CT scan showing abnormality - duodenum  Patient will need EGD eventually  Will  schedule MRI/MRCP  3   Splenomegaly concerning for undiagnosed a cirrhosis  PT/ INR is within normal limits and total albumin is slightly decreased at 3  Ultrasound pending      Principal Problem:    Jaundice  Active Problems:    Transaminitis    Gastroenteritis    History of IV drug use    Splenomegaly    Abnormal CT scan, small bowel      DARON Morrison

## 2019-07-02 NOTE — ASSESSMENT & PLAN NOTE
Maybe 2* hepatitis vs gastroenteritis  Start IV fluid hydration with supportive care with antiemetics

## 2019-07-02 NOTE — ASSESSMENT & PLAN NOTE
Patient reports that he has been clean for the last 3 months his urine drug screen supports this  Given significant transaminitis with T bili elevation agree with concern for viral hepatitis    reviewed with confirm dosing of buprenorphine/naloxone 8-2 mg bid SL BID    D/w pt who is amenable to dose reduction by 50% to 4-1mg BID SL  Discussed with patient for checking HIV status as well who is amenable

## 2019-07-03 ENCOUNTER — APPOINTMENT (INPATIENT)
Dept: MRI IMAGING | Facility: HOSPITAL | Age: 39
DRG: 283 | End: 2019-07-03
Payer: COMMERCIAL

## 2019-07-03 LAB
ALBUMIN SERPL BCP-MCNC: 2.8 G/DL (ref 3.5–5)
ALP SERPL-CCNC: 184 U/L (ref 46–116)
ALT SERPL W P-5'-P-CCNC: 1588 U/L (ref 12–78)
ANION GAP SERPL CALCULATED.3IONS-SCNC: 9 MMOL/L (ref 4–13)
AST SERPL W P-5'-P-CCNC: 1880 U/L (ref 5–45)
BILIRUB SERPL-MCNC: 10.97 MG/DL (ref 0.2–1)
BUN SERPL-MCNC: 10 MG/DL (ref 5–25)
CALCIUM SERPL-MCNC: 8.8 MG/DL (ref 8.3–10.1)
CHLORIDE SERPL-SCNC: 107 MMOL/L (ref 100–108)
CO2 SERPL-SCNC: 26 MMOL/L (ref 21–32)
CREAT SERPL-MCNC: 0.82 MG/DL (ref 0.6–1.3)
GFR SERPL CREATININE-BSD FRML MDRD: 114 ML/MIN/1.73SQ M
GLUCOSE SERPL-MCNC: 90 MG/DL (ref 65–140)
INR PPP: 1.07 (ref 0.84–1.19)
POTASSIUM SERPL-SCNC: 4.1 MMOL/L (ref 3.5–5.3)
PROT SERPL-MCNC: 6.2 G/DL (ref 6.4–8.2)
PROTHROMBIN TIME: 14 SECONDS (ref 11.6–14.5)
SODIUM SERPL-SCNC: 142 MMOL/L (ref 136–145)

## 2019-07-03 PROCEDURE — 85610 PROTHROMBIN TIME: CPT | Performed by: PHYSICIAN ASSISTANT

## 2019-07-03 PROCEDURE — 80053 COMPREHEN METABOLIC PANEL: CPT | Performed by: PHYSICIAN ASSISTANT

## 2019-07-03 PROCEDURE — 99232 SBSQ HOSP IP/OBS MODERATE 35: CPT | Performed by: HOSPITALIST

## 2019-07-03 PROCEDURE — 87902 NFCT AGT GNTYP ALYS HEP C: CPT | Performed by: PHYSICIAN ASSISTANT

## 2019-07-03 PROCEDURE — 87522 HEPATITIS C REVRS TRNSCRPJ: CPT | Performed by: PHYSICIAN ASSISTANT

## 2019-07-03 RX ORDER — HYDROXYZINE HYDROCHLORIDE 25 MG/1
25 TABLET, FILM COATED ORAL EVERY 6 HOURS PRN
Status: DISCONTINUED | OUTPATIENT
Start: 2019-07-03 | End: 2019-07-09 | Stop reason: HOSPADM

## 2019-07-03 RX ORDER — DIPHENHYDRAMINE HCL 25 MG
12.5 TABLET ORAL EVERY 6 HOURS PRN
Status: DISCONTINUED | OUTPATIENT
Start: 2019-07-03 | End: 2019-07-09 | Stop reason: HOSPADM

## 2019-07-03 RX ORDER — IBUPROFEN 600 MG/1
600 TABLET ORAL EVERY 6 HOURS PRN
Status: DISCONTINUED | OUTPATIENT
Start: 2019-07-03 | End: 2019-07-09

## 2019-07-03 RX ORDER — TRAMADOL HYDROCHLORIDE 50 MG/1
50 TABLET ORAL EVERY 6 HOURS PRN
Status: DISCONTINUED | OUTPATIENT
Start: 2019-07-03 | End: 2019-07-03 | Stop reason: ALTCHOICE

## 2019-07-03 RX ADMIN — HEPARIN SODIUM 5000 UNITS: 5000 INJECTION INTRAVENOUS; SUBCUTANEOUS at 21:07

## 2019-07-03 RX ADMIN — CHOLESTYRAMINE 4 G: 4 POWDER, FOR SUSPENSION ORAL at 17:04

## 2019-07-03 RX ADMIN — HEPARIN SODIUM 5000 UNITS: 5000 INJECTION INTRAVENOUS; SUBCUTANEOUS at 13:26

## 2019-07-03 RX ADMIN — BUPRENORPHINE HYDROCHLORIDE, NALOXONE HYDROCHLORIDE 2 FILM: 2; .5 FILM, SOLUBLE BUCCAL; SUBLINGUAL at 17:04

## 2019-07-03 RX ADMIN — BUPRENORPHINE HYDROCHLORIDE, NALOXONE HYDROCHLORIDE 2 FILM: 2; .5 FILM, SOLUBLE BUCCAL; SUBLINGUAL at 09:00

## 2019-07-03 RX ADMIN — DIPHENHYDRAMINE HYDROCHLORIDE 12.5 MG: 50 INJECTION, SOLUTION INTRAMUSCULAR; INTRAVENOUS at 04:51

## 2019-07-03 RX ADMIN — HEPARIN SODIUM 5000 UNITS: 5000 INJECTION INTRAVENOUS; SUBCUTANEOUS at 06:42

## 2019-07-03 RX ADMIN — DIPHENHYDRAMINE HCL 12.5 MG: 25 TABLET ORAL at 13:28

## 2019-07-03 RX ADMIN — CHOLESTYRAMINE 4 G: 4 POWDER, FOR SUSPENSION ORAL at 08:59

## 2019-07-03 RX ADMIN — HYDROXYZINE HYDROCHLORIDE 25 MG: 25 TABLET ORAL at 20:04

## 2019-07-03 RX ADMIN — IBUPROFEN 400 MG: 400 TABLET ORAL at 09:00

## 2019-07-03 NOTE — PROGRESS NOTES
Patient Name: Susan Burr  Patient MRN: [id number]  Date: 07/03/19  Service: [facility]    Subjective   Patient continues to complain of malaise and pruritus  He has had abdominal pain as well as loose stool  Family at bedside updated  No encephalopathy  Vitals  Blood pressure 110/58, pulse (!) 49, temperature 97 6 °F (36 4 °C), temperature source Tympanic, resp  rate 18, weight 85 7 kg (188 lb 14 9 oz), SpO2 99 %  Physical Exam:     General Appearance:    Awake, alert, oriented x3, mild distress due to pruritus, well developed, well nourished   Head:    Normocephalic without obvious abnormality   Eyes:    PERRL, conjunctival icterus noted, EOM's intact        Neck:   Supple, no adenopathy   Throat:   Mucous membranes moist   Lungs:     Clear to auscultation bilaterally, no wheezing or rhonchi   Heart:    Regular rate and rhythm, S1 and S2 normal, no murmur   Abdomen:     Soft, right-sided tenderness noted, non-distended  bowel sounds active  No  masses, rebound or guarding  Extremities:   Extremities without edema   Psych  Derm:   Normal affect    + jaundice   Neurologic:   CNII-XII grossly intact  Speech intact    No encephalopathy         Laboratory Studies  Results from last 7 days   Lab Units 07/03/19  0441 07/02/19  0425 07/01/19  2257 07/01/19  1925   WBC Thousand/uL  --  6 00  --  6 77   HEMOGLOBIN g/dL  --  13 4  --  14 8   HEMATOCRIT %  --  40 7  --  43 9   PLATELETS Thousands/uL  --  251 252 277   INR  1 07  --  0 97 0 94     Results from last 7 days   Lab Units 07/03/19 0441 07/02/19  0425 07/01/19  1925   SODIUM mmol/L 142 142 141   POTASSIUM mmol/L 4 1 3 8 3 7   CHLORIDE mmol/L 107 106 103   CO2 mmol/L 26 27 26   BUN mg/dL 10 11 13   CREATININE mg/dL 0 82 0 84 0 82   CALCIUM mg/dL 8 8 8 3 9 2   ALBUMIN g/dL 2 8* 3 0* 3 5   TOTAL BILIRUBIN mg/dL 10 97* 10 03* 10 99*   ALK PHOS U/L 184* 178* 212*   ALT U/L 1,588* 1,495* 1,659*   AST U/L 1,880* 1,693* 1,925* Imaging and Other Studies      Inhouse Medications     Current Facility-Administered Medications:     buprenorphine-naloxone (SUBOXONE) 2-0 5 mg per SL film 2 Film, 2 Film, Sublingual, BID, 2 Film at 07/03/19 0900    cholestyramine sugar free (QUESTRAN LIGHT) packet 4 g, 4 g, Oral, BID, 4 g at 07/03/19 0859    diphenhydrAMINE (BENADRYL) tablet 12 5 mg, 12 5 mg, Oral, Q6H PRN    heparin (porcine) subcutaneous injection 5,000 Units, 5,000 Units, Subcutaneous, Q8H [address], 5,000 Units at 07/03/19 0642 **AND** [COMPLETED] Platelet count, , , Once    ibuprofen (MOTRIN) tablet 400 mg, 400 mg, Oral, Q6H PRN, 400 mg at 07/03/19 0900    nicotine (NICODERM CQ) 7 mg/24hr TD 24 hr patch 1 patch, 1 patch, Transdermal, Daily    ondansetron (ZOFRAN) injection 4 mg, 4 mg, Intravenous, Q6H PRN    sodium chloride 0 9 % infusion, 75 mL/hr, Intravenous, Continuous, 75 mL/hr at 07/02/19 2156      Assessment/Plan:    1  Elevated liver enzymes, mixed pattern  LFTs increase from yesterday  Patient's symptomatic with whole-body pruritus which is more commonly seen with intrahepatic cholestasis, however this case ALP is only slightly elevated with significant hyperbilirubinemia  Continue Questran for pruritus/hyperbilirubinemia  RUQ US shows mildly enlarged liver with smooth surface, no evidence of mass  Limited imaging of PV, but noted to be patent and hepatopetal   No choledocholithiasis, CBD measures 9 mm  MRCP pending  Patient found to have hepatitis C antibody positive with history of IV drug use  HCV viral load and genotype pending  Will also check EBV and CMV  Continue to trend liver enzymes  2  CT showing duodenal abnormality - will eventually need EGD  3  Splenomegaly concerning for undiagnosed cirrhosis  Synthetic liver function appears largely intact with normal platelets, normal protime/INR, but decreased albumin             Rachelle Rosales PA-C

## 2019-07-03 NOTE — ASSESSMENT & PLAN NOTE
Acute hepatitis with history of IV Drug abuse  Hepatitis C positive, I spoke with Dr Ida Cameron from ID, and usually acute hep C does not present like this  appreciate GI help  Waiting for MRI  LFTs about the same     Biggest complaint is total body pruritis    Not much benefit from IV benadryl or cholestyramine so far  Will also add atarax

## 2019-07-03 NOTE — PROGRESS NOTES
Progress Note - Vicky Lopezallen 1980, 41 y o  male MRN: [id number]    Unit/Bed#: E2 -01 Encounter: [id number]    Primary Care Provider: No primary care provider on file  Date and time admitted to hospital: 2019  6:57 PM        Transaminitis  Assessment & Plan  Acute hepatitis with history of IV Drug abuse  Hepatitis C positive, I spoke with Dr Luiza Bryant from [facility], and usually acute hep C does not present like this  appreciate GI help  Waiting for MRI  LFTs about the same     Biggest complaint is total body pruritis    Not much benefit from IV benadryl or cholestyramine so far  Will also add atarax  * Jaundice  Assessment & Plan  Secondary to acute hepatitis  Appreciate GI help  Hepatitis C is positive    History of IV drug use  Assessment & Plan  Patient reports that he has been clean for the last 3 months his urine drug screen supports this  Given significant transaminitis with T bili elevation agree with concern for viral hepatitis    reviewed with confirm dosing of buprenorphine/naloxone 8-2 mg bid SL BID  D/w pt who is amenable to dose reduction by 50% to 4-1mg BID SL  Discussed with patient for checking HIV status as well who is amenable    opiod dependence, chronic  He is chronically on suboxone          Subjective:   Still complains of itching all over  Abdominal pain is still present but improving  Objective:     Vitals:   Temp (24hrs), Av 5 °F (36 4 °C), Min:97 3 °F (36 3 °C), Max:97 6 °F (36 4 °C)    Temp:  [97 3 °F (36 3 °C)-97 6 °F (36 4 °C)] 97 5 °F (36 4 °C)  HR:  [49-56] 56  Resp:  [18-20] 18  BP: (110-123)/(58-75) 121/72  SpO2:  [97 %-99 %] 97 %  Body mass index is 28 73 kg/m²  Input and Output Summary (last 24 hours):     No intake or output data in the 24 hours ending 19 6098    Physical Exam:     Physical Exam   HENT:   Head: Normocephalic and atraumatic  Eyes: Pupils are equal, round, and reactive to light   EOM are normal  Scleral icterus is present  Cardiovascular: Normal rate and regular rhythm  Exam reveals no gallop and no friction rub  No murmur heard  Pulmonary/Chest: Effort normal and breath sounds normal  He has no wheezes  He has no rales  Abdominal: Soft  Bowel sounds are normal  There is tenderness (mild RUQ tenderness  )  There is no rebound and no guarding  Musculoskeletal: He exhibits no edema  Nursing note and vitals reviewed          Additional Data:     Labs:    Results from last 7 days   Lab Units 07/02/19  0425   WBC Thousand/uL 6 00   HEMOGLOBIN g/dL 13 4   HEMATOCRIT % 40 7   PLATELETS Thousands/uL 251   NEUTROS PCT % 52   LYMPHS PCT % 32   MONOS PCT % 12   EOS PCT % 3     Results from last 7 days   Lab Units 07/03/19  0441   POTASSIUM mmol/L 4 1   CHLORIDE mmol/L 107   CO2 mmol/L 26   BUN mg/dL 10   CREATININE mg/dL 0 82   CALCIUM mg/dL 8 8   ALK PHOS U/L 184*   ALT U/L 1,588*   AST U/L 1,880*     Results from last 7 days   Lab Units 07/03/19  0441   INR  1 07                   * I Have Reviewed All Lab Data     Recent Cultures (last 7 days):     Results from last 7 days   Lab Units 07/01/19  1934 07/01/19  1923   BLOOD CULTURE  No Growth at 24 hrs  No Growth at 24 hrs           Last 24 Hours Medication List:     Current Facility-Administered Medications:  buprenorphine-naloxone 2 Film Sublingual BID Katya Pearson PA-C    cholestyramine sugar free 4 g Oral BID Sung Jersey CRNP    diphenhydrAMINE 12 5 mg Oral Q6H PRN Keo Prechtel, DO    heparin (porcine) 5,000 Units Subcutaneous Q8H [facility] Katya Pearson PA-C    hydrOXYzine HCL 25 mg Oral Q6H PRN Keo Prechtel, DO    ibuprofen 400 mg Oral Q6H PRN DARON Cordon    nicotine 1 patch Transdermal Daily Katya Pearson PA-C    ondansetron 4 mg Intravenous Q6H PRN Katya Pearson PA-C    sodium chloride 75 mL/hr Intravenous Continuous Katya Pearson PA-C Last Rate: 75 mL/hr (07/02/19 2156)   traMADol 50 mg Oral Q6H PRN Jesus Manuel Sutton,           VTE Pharmacologic Prophylaxis:   Pharmacologic: Heparin      Current Length of Stay: 2 day(s)    Current Patient Status: Inpatient       Discharge Plan:   Code Status: Level 1 - Full Code           Today, Patient Was Seen By: Chandu Woods DO    ** Please Note: Dictation voice to text software may have been used in the creation of this document   **

## 2019-07-03 NOTE — ASSESSMENT & PLAN NOTE
Patient reports that he has been clean for the last 3 months his urine drug screen supports this  Given significant transaminitis with T bili elevation agree with concern for viral hepatitis    reviewed with confirm dosing of buprenorphine/naloxone 8-2 mg bid SL BID  D/w pt who is amenable to dose reduction by 50% to 4-1mg BID SL  Discussed with patient for checking HIV status as well who is amenable    opiod dependence, chronic     He is chronically on suboxone

## 2019-07-04 LAB
ALBUMIN SERPL BCP-MCNC: 2.9 G/DL (ref 3.5–5)
ALP SERPL-CCNC: 195 U/L (ref 46–116)
ALT SERPL W P-5'-P-CCNC: 1674 U/L (ref 12–78)
ANION GAP SERPL CALCULATED.3IONS-SCNC: 9 MMOL/L (ref 4–13)
AST SERPL W P-5'-P-CCNC: 1907 U/L (ref 5–45)
BILIRUB SERPL-MCNC: 11.77 MG/DL (ref 0.2–1)
BUN SERPL-MCNC: 10 MG/DL (ref 5–25)
CALCIUM SERPL-MCNC: 9.2 MG/DL (ref 8.3–10.1)
CHLORIDE SERPL-SCNC: 104 MMOL/L (ref 100–108)
CO2 SERPL-SCNC: 26 MMOL/L (ref 21–32)
CREAT SERPL-MCNC: 0.73 MG/DL (ref 0.6–1.3)
GFR SERPL CREATININE-BSD FRML MDRD: 119 ML/MIN/1.73SQ M
GLUCOSE SERPL-MCNC: 92 MG/DL (ref 65–140)
INR PPP: 1.05 (ref 0.84–1.19)
POTASSIUM SERPL-SCNC: 4.1 MMOL/L (ref 3.5–5.3)
PROT SERPL-MCNC: 6.2 G/DL (ref 6.4–8.2)
PROTHROMBIN TIME: 13.8 SECONDS (ref 11.6–14.5)
SODIUM SERPL-SCNC: 139 MMOL/L (ref 136–145)

## 2019-07-04 PROCEDURE — 80053 COMPREHEN METABOLIC PANEL: CPT | Performed by: PHYSICIAN ASSISTANT

## 2019-07-04 PROCEDURE — 85610 PROTHROMBIN TIME: CPT | Performed by: PHYSICIAN ASSISTANT

## 2019-07-04 PROCEDURE — 99232 SBSQ HOSP IP/OBS MODERATE 35: CPT | Performed by: HOSPITALIST

## 2019-07-04 RX ORDER — LORAZEPAM 2 MG/ML
1 INJECTION INTRAMUSCULAR ONCE
Status: COMPLETED | OUTPATIENT
Start: 2019-07-04 | End: 2019-07-06

## 2019-07-04 RX ADMIN — HEPARIN SODIUM 5000 UNITS: 5000 INJECTION INTRAVENOUS; SUBCUTANEOUS at 05:36

## 2019-07-04 RX ADMIN — HEPARIN SODIUM 5000 UNITS: 5000 INJECTION INTRAVENOUS; SUBCUTANEOUS at 13:53

## 2019-07-04 RX ADMIN — HYDROXYZINE HYDROCHLORIDE 25 MG: 25 TABLET ORAL at 22:15

## 2019-07-04 RX ADMIN — BUPRENORPHINE HYDROCHLORIDE, NALOXONE HYDROCHLORIDE 2 FILM: 2; .5 FILM, SOLUBLE BUCCAL; SUBLINGUAL at 09:56

## 2019-07-04 RX ADMIN — BUPRENORPHINE HYDROCHLORIDE, NALOXONE HYDROCHLORIDE 2 FILM: 2; .5 FILM, SOLUBLE BUCCAL; SUBLINGUAL at 18:17

## 2019-07-04 RX ADMIN — IBUPROFEN 600 MG: 600 TABLET ORAL at 09:55

## 2019-07-04 RX ADMIN — HYDROXYZINE HYDROCHLORIDE 25 MG: 25 TABLET ORAL at 16:02

## 2019-07-04 RX ADMIN — HEPARIN SODIUM 5000 UNITS: 5000 INJECTION INTRAVENOUS; SUBCUTANEOUS at 22:15

## 2019-07-04 RX ADMIN — HYDROXYZINE HYDROCHLORIDE 25 MG: 25 TABLET ORAL at 09:55

## 2019-07-04 RX ADMIN — HYDROXYZINE HYDROCHLORIDE 25 MG: 25 TABLET ORAL at 02:07

## 2019-07-04 RX ADMIN — SODIUM CHLORIDE 75 ML/HR: 0.9 INJECTION, SOLUTION INTRAVENOUS at 13:59

## 2019-07-04 RX ADMIN — MORPHINE SULFATE 2 MG: 2 INJECTION, SOLUTION INTRAMUSCULAR; INTRAVENOUS at 15:59

## 2019-07-04 RX ADMIN — IBUPROFEN 600 MG: 600 TABLET ORAL at 23:08

## 2019-07-04 RX ADMIN — CHOLESTYRAMINE 4 G: 4 POWDER, FOR SUSPENSION ORAL at 09:55

## 2019-07-04 RX ADMIN — CHOLESTYRAMINE 4 G: 4 POWDER, FOR SUSPENSION ORAL at 18:13

## 2019-07-04 RX ADMIN — DIPHENHYDRAMINE HCL 12.5 MG: 25 TABLET ORAL at 13:53

## 2019-07-04 NOTE — PROGRESS NOTES
Progress Note - Vicky Sg 1980, 41 y o  male MRN: [id number]    Unit/Bed#: E2 -01 Encounter: [id number]    Primary Care Provider: No primary care provider on file  Date and time admitted to hospital: 2019  6:57 PM        Transaminitis  Assessment & Plan  Acute hepatitis with history of IV Drug abuse  Hepatitis C positive,   Appreciate GI help  LFTs a little worse today  Waiting on MRI abd  Will defer to GI if we need to transfer patient    History of IV drug use  Assessment & Plan  On suboxone          Subjective:   Still itching a lot  Abdominal pain is tolerable right now, but was severe earlier  He is eating and drinking  Objective:     Vitals:   Temp (24hrs), Av 2 °F (36 8 °C), Min:97 5 °F (36 4 °C), Max:98 6 °F (37 °C)    Temp:  [97 5 °F (36 4 °C)-98 6 °F (37 °C)] 98 6 °F (37 °C)  HR:  [55-63] 55  Resp:  [18] 18  BP: (104-121)/(56-72) 104/56  SpO2:  [97 %] 97 %  Body mass index is 28 73 kg/m²  Input and Output Summary (last 24 hours):     No intake or output data in the 24 hours ending 19 1415    Physical Exam:     Physical Exam   HENT:   Head: Normocephalic and atraumatic  Eyes: Pupils are equal, round, and reactive to light  EOM are normal  Scleral icterus is present  Cardiovascular: Normal rate and regular rhythm  Exam reveals no gallop and no friction rub  No murmur heard  Pulmonary/Chest: Effort normal and breath sounds normal  He has no wheezes  He has no rales  Abdominal: Soft  Bowel sounds are normal  There is tenderness (mild RUQ tenderness)  Musculoskeletal: He exhibits no edema  Skin:   Yellow skin     Nursing note and vitals reviewed              Additional Data:     Labs:    Results from last 7 days   Lab Units 19  0425   WBC Thousand/uL 6 00   HEMOGLOBIN g/dL 13 4   HEMATOCRIT % 40 7   PLATELETS Thousands/uL 251   NEUTROS PCT % 52   LYMPHS PCT % 32   MONOS PCT % 12   EOS PCT % 3     Results from last 7 days   Lab Units 07/04/19  0459   POTASSIUM mmol/L 4 1   CHLORIDE mmol/L 104   CO2 mmol/L 26   BUN mg/dL 10   CREATININE mg/dL 0 73   CALCIUM mg/dL 9 2   ALK PHOS U/L 195*   ALT U/L 1,674*   AST U/L 1,907*     Results from last 7 days   Lab Units 07/04/19  0459   INR  1 05                   * I Have Reviewed All Lab Data     Recent Cultures (last 7 days):     Results from last 7 days   Lab Units 07/01/19 1934 07/01/19 1923   BLOOD CULTURE  No Growth at 48 hrs  No Growth at 48 hrs  Last 24 Hours Medication List:     Current Facility-Administered Medications:  buprenorphine-naloxone 2 Film Sublingual BID Katya Pearson PA-C    cholestyramine sugar free 4 g Oral BID DARON Thompson    diphenhydrAMINE 12 5 mg Oral Q6H PRN Keo Prechtel, DO    heparin (porcine) 5,000 Units Subcutaneous Q8H [facility] Katya Pearson PA-C    hydrOXYzine HCL 25 mg Oral Q6H PRN Keo Prechtel, DO    ibuprofen 600 mg Oral Q6H PRN Keo Prechtel, DO    LORazepam 1 mg Intravenous Once [employer], DO    morphine injection 2 mg Intravenous Once [employer], DO    nicotine 1 patch Transdermal Daily Katya Pearson PA-C    ondansetron 4 mg Intravenous Q6H PRN Katya Pearson PA-C    sodium chloride 75 mL/hr Intravenous Continuous Katya Pearson PA-C Last Rate: 75 mL/hr (07/04/19 1359)         VTE Pharmacologic Prophylaxis:   Pharmacologic: Heparin      Current Length of Stay: 3 day(s)    Current Patient Status: Inpatient       Discharge Plan:   Code Status: Level 1 - Full Code           Today, Patient Was Seen By: Aubrey Gavin DO    ** Please Note: Dictation voice to text software may have been used in the creation of this document   **

## 2019-07-04 NOTE — QUICK NOTE
Although finding hepatitis C in the acute phase is quite unusual, I do not believe there is anything in his history that is not compatible with acute hepatitis-C  We await the antigen study  Also awaiting EBV and CMV  If hepatitis C RNA is positive however we still have the problem that this is not definite proof that indeed it is C virus that is causing the problem  although it would be most likely  If hepatitis C RNA is negative however then we are left with idiopathic injury to the liver  If C is positive, usual practice is to wait at least 6 months to see if spontaneous clearance of the virus will occur  However therapy acutely can be undertaken and may be a consideration in his situation because of the itching and the fact that the numbers seem to be trending worse  We can also consider transfer to a liver unit in [place] or [place]

## 2019-07-04 NOTE — ASSESSMENT & PLAN NOTE
Acute hepatitis with history of IV Drug abuse  Hepatitis C positive,   Appreciate GI help  LFTs a little worse today  Waiting on MRI abd  Will defer to GI if we need to transfer patient

## 2019-07-04 NOTE — PLAN OF CARE
Problem: Nutrition/Hydration-ADULT  Goal: Nutrient/Hydration intake appropriate for improving, restoring or maintaining nutritional needs  Description  Monitor and assess patient's nutrition/hydration status for malnutrition (ex- brittle hair, bruises, dry skin, pale skin and conjunctiva, muscle wasting, smooth red tongue, and disorientation)  Collaborate with interdisciplinary team and initiate plan and interventions as ordered  Monitor patient's weight and dietary intake as ordered or per policy  Utilize nutrition screening tool and intervene per policy  Determine patient's food preferences and provide high-protein, high-caloric foods as appropriate  INTERVENTIONS:  - Monitor oral intake, urinary output, labs, and treatment plans  - Assess nutrition and hydration status and recommend course of action  - Evaluate amount of meals eaten  - Assist patient with eating if necessary   - Allow adequate time for meals  - Recommend/ encourage appropriate diets, oral nutritional supplements, and vitamin/mineral supplements  - Order, calculate, and assess calorie counts as needed  - Recommend, monitor, and adjust tube feedings and TPN/PPN based on assessed needs  - Assess need for intravenous fluids  - Provide specific nutrition/hydration education as appropriate  - Include patient/family/caregiver in decisions related to nutrition  Outcome: Progressing     Problem: Potential for Falls  Goal: Patient will remain free of falls  Description  INTERVENTIONS:  - Assess patient frequently for physical needs  -  Identify cognitive and physical deficits and behaviors that affect risk of falls    -  [place] fall precautions as indicated by assessment   - Educate patient/family on patient safety including physical limitations  - Instruct patient to call for assistance with activity based on assessment  - Modify environment to reduce risk of injury  - Consider OT/PT consult to assist with strengthening/mobility  Outcome: Progressing

## 2019-07-05 LAB
ALBUMIN SERPL BCP-MCNC: 2.7 G/DL (ref 3.5–5)
ALP SERPL-CCNC: 179 U/L (ref 46–116)
ALT SERPL W P-5'-P-CCNC: 1517 U/L (ref 12–78)
ANION GAP SERPL CALCULATED.3IONS-SCNC: 10 MMOL/L (ref 4–13)
AST SERPL W P-5'-P-CCNC: 1642 U/L (ref 5–45)
BASOPHILS # BLD AUTO: 0.03 THOUSANDS/ΜL (ref 0–0.1)
BASOPHILS NFR BLD AUTO: 1 % (ref 0–1)
BILIRUB SERPL-MCNC: 11.04 MG/DL (ref 0.2–1)
BUN SERPL-MCNC: 11 MG/DL (ref 5–25)
CALCIUM SERPL-MCNC: 8.7 MG/DL (ref 8.3–10.1)
CHLORIDE SERPL-SCNC: 107 MMOL/L (ref 100–108)
CO2 SERPL-SCNC: 24 MMOL/L (ref 21–32)
CREAT SERPL-MCNC: 0.72 MG/DL (ref 0.6–1.3)
EOSINOPHIL # BLD AUTO: 0.17 THOUSAND/ΜL (ref 0–0.61)
EOSINOPHIL NFR BLD AUTO: 3 % (ref 0–6)
ERYTHROCYTE [DISTWIDTH] IN BLOOD BY AUTOMATED COUNT: 15.9 % (ref 11.6–15.1)
GFR SERPL CREATININE-BSD FRML MDRD: 120 ML/MIN/1.73SQ M
GLUCOSE SERPL-MCNC: 113 MG/DL (ref 65–140)
HCT VFR BLD AUTO: 40.7 % (ref 36.5–49.3)
HCV RNA SERPL NAA+PROBE-ACNC: NORMAL IU/ML
HCV RNA SERPL NAA+PROBE-LOG IU: 6.5 LOG10 IU/ML
HGB BLD-MCNC: 13.2 G/DL (ref 12–17)
IMM GRANULOCYTES # BLD AUTO: 0.03 THOUSAND/UL (ref 0–0.2)
IMM GRANULOCYTES NFR BLD AUTO: 1 % (ref 0–2)
LYMPHOCYTES # BLD AUTO: 2.22 THOUSANDS/ΜL (ref 0.6–4.47)
LYMPHOCYTES NFR BLD AUTO: 40 % (ref 14–44)
MCH RBC QN AUTO: 28.6 PG (ref 26.8–34.3)
MCHC RBC AUTO-ENTMCNC: 32.4 G/DL (ref 31.4–37.4)
MCV RBC AUTO: 88 FL (ref 82–98)
MONOCYTES # BLD AUTO: 0.67 THOUSAND/ΜL (ref 0.17–1.22)
MONOCYTES NFR BLD AUTO: 12 % (ref 4–12)
NEUTROPHILS # BLD AUTO: 2.46 THOUSANDS/ΜL (ref 1.85–7.62)
NEUTS SEG NFR BLD AUTO: 43 % (ref 43–75)
NRBC BLD AUTO-RTO: 0 /100 WBCS
PLATELET # BLD AUTO: 235 THOUSANDS/UL (ref 149–390)
PMV BLD AUTO: 13.2 FL (ref 8.9–12.7)
POTASSIUM SERPL-SCNC: 4 MMOL/L (ref 3.5–5.3)
PROT SERPL-MCNC: 6 G/DL (ref 6.4–8.2)
RBC # BLD AUTO: 4.61 MILLION/UL (ref 3.88–5.62)
SODIUM SERPL-SCNC: 141 MMOL/L (ref 136–145)
TEST INFORMATION: NORMAL
WBC # BLD AUTO: 5.58 THOUSAND/UL (ref 4.31–10.16)

## 2019-07-05 PROCEDURE — 85025 COMPLETE CBC W/AUTO DIFF WBC: CPT | Performed by: HOSPITALIST

## 2019-07-05 PROCEDURE — 80053 COMPREHEN METABOLIC PANEL: CPT | Performed by: HOSPITALIST

## 2019-07-05 PROCEDURE — 99232 SBSQ HOSP IP/OBS MODERATE 35: CPT | Performed by: HOSPITALIST

## 2019-07-05 RX ORDER — KETOROLAC TROMETHAMINE 30 MG/ML
15 INJECTION, SOLUTION INTRAMUSCULAR; INTRAVENOUS ONCE
Status: DISCONTINUED | OUTPATIENT
Start: 2019-07-05 | End: 2019-07-05

## 2019-07-05 RX ORDER — KETOROLAC TROMETHAMINE 30 MG/ML
15 INJECTION, SOLUTION INTRAMUSCULAR; INTRAVENOUS ONCE
Status: COMPLETED | OUTPATIENT
Start: 2019-07-05 | End: 2019-07-05

## 2019-07-05 RX ADMIN — KETOROLAC TROMETHAMINE 15 MG: 30 INJECTION, SOLUTION INTRAMUSCULAR; INTRAVENOUS at 03:46

## 2019-07-05 RX ADMIN — SODIUM CHLORIDE 75 ML/HR: 0.9 INJECTION, SOLUTION INTRAVENOUS at 03:12

## 2019-07-05 RX ADMIN — DIPHENHYDRAMINE HCL 12.5 MG: 25 TABLET ORAL at 03:15

## 2019-07-05 RX ADMIN — BUPRENORPHINE HYDROCHLORIDE, NALOXONE HYDROCHLORIDE 2 FILM: 2; .5 FILM, SOLUBLE BUCCAL; SUBLINGUAL at 17:10

## 2019-07-05 RX ADMIN — DIPHENHYDRAMINE HCL 12.5 MG: 25 TABLET ORAL at 12:03

## 2019-07-05 RX ADMIN — ONDANSETRON 4 MG: 2 INJECTION INTRAMUSCULAR; INTRAVENOUS at 11:56

## 2019-07-05 RX ADMIN — IBUPROFEN 600 MG: 600 TABLET ORAL at 11:56

## 2019-07-05 RX ADMIN — CHOLESTYRAMINE 4 G: 4 POWDER, FOR SUSPENSION ORAL at 10:18

## 2019-07-05 RX ADMIN — SODIUM CHLORIDE 75 ML/HR: 0.9 INJECTION, SOLUTION INTRAVENOUS at 16:55

## 2019-07-05 RX ADMIN — CHOLESTYRAMINE 4 G: 4 POWDER, FOR SUSPENSION ORAL at 17:13

## 2019-07-05 RX ADMIN — BUPRENORPHINE HYDROCHLORIDE, NALOXONE HYDROCHLORIDE 2 FILM: 2; .5 FILM, SOLUBLE BUCCAL; SUBLINGUAL at 10:17

## 2019-07-05 RX ADMIN — IBUPROFEN 600 MG: 600 TABLET ORAL at 20:30

## 2019-07-05 RX ADMIN — HEPARIN SODIUM 5000 UNITS: 5000 INJECTION INTRAVENOUS; SUBCUTANEOUS at 21:10

## 2019-07-05 RX ADMIN — HYDROXYZINE HYDROCHLORIDE 25 MG: 25 TABLET ORAL at 12:04

## 2019-07-05 RX ADMIN — HEPARIN SODIUM 5000 UNITS: 5000 INJECTION INTRAVENOUS; SUBCUTANEOUS at 06:04

## 2019-07-05 RX ADMIN — DIPHENHYDRAMINE HCL 12.5 MG: 25 TABLET ORAL at 19:53

## 2019-07-05 RX ADMIN — HEPARIN SODIUM 5000 UNITS: 5000 INJECTION INTRAVENOUS; SUBCUTANEOUS at 13:50

## 2019-07-05 RX ADMIN — HYDROXYZINE HYDROCHLORIDE 25 MG: 25 TABLET ORAL at 19:53

## 2019-07-05 NOTE — PROGRESS NOTES
Progress Note - Vicky Lopezallen 1980, 41 y o  male MRN: [id number]    Unit/Bed#: E2 -01 Encounter: [id number]    Primary Care Provider: No primary care provider on file  Date and time admitted to hospital: 2019  6:57 PM        Transaminitis  Assessment & Plan  Appreciate GI help  Lab work looks slightly better  Still has a lot of itching    * Jaundice  Assessment & Plan  Secondary to acute hepatitis  Appreciate GI help  Hepatitis C is positive          Subjective:   Still complains of a lot of itching  abd pain is better        Objective:     Vitals:   Temp (24hrs), Av °F (36 7 °C), Min:97 9 °F (36 6 °C), Max:98 1 °F (36 7 °C)    Temp:  [97 9 °F (36 6 °C)-98 1 °F (36 7 °C)] 98 1 °F (36 7 °C)  HR:  [56-62] 62  Resp:  [16-20] 16  BP: (109-130)/(59-75) 130/68  SpO2:  [98 %-99 %] 99 %  Body mass index is 28 73 kg/m²  Input and Output Summary (last 24 hours): Intake/Output Summary (Last 24 hours) at 2019 1708  Last data filed at 2019 2200  Gross per 24 hour   Intake 1741 5 ml   Output --   Net 1741 5 ml       Physical Exam:     Physical Exam   HENT:   Head: Normocephalic and atraumatic  Eyes: Pupils are equal, round, and reactive to light  EOM are normal  Scleral icterus is present  Cardiovascular: Normal rate and regular rhythm  Exam reveals no gallop and no friction rub  No murmur heard  Pulmonary/Chest: Effort normal and breath sounds normal  He has no wheezes  He has no rales  Abdominal: Soft  Bowel sounds are normal  There is no tenderness  Musculoskeletal: He exhibits no edema  Skin:   Skin icterus looks better   Nursing note and vitals reviewed              Additional Data:     Labs:    Results from last 7 days   Lab Units 19   WBC Thousand/uL 5 58   HEMOGLOBIN g/dL 13 2   HEMATOCRIT % 40 7   PLATELETS Thousands/uL 235   NEUTROS PCT % 43   LYMPHS PCT % 40   MONOS PCT % 12   EOS PCT % 3     Results from last 7 days   Lab Units 19 POTASSIUM mmol/L 4 0   CHLORIDE mmol/L 107   CO2 mmol/L 24   BUN mg/dL 11   CREATININE mg/dL 0 72   CALCIUM mg/dL 8 7   ALK PHOS U/L 179*   ALT U/L 1,517*   AST U/L 1,642*     Results from last 7 days   Lab Units 07/04/19  0459   INR  1 05                   * I Have Reviewed All Lab Data     Recent Cultures (last 7 days):     Results from last 7 days   Lab Units 07/01/19  1934 07/01/19  1923   BLOOD CULTURE  No Growth at 72 hrs  No Growth at 72 hrs  Last 24 Hours Medication List:     Current Facility-Administered Medications:  buprenorphine-naloxone 2 Film Sublingual BID Katya Pearson PA-C    cholestyramine sugar free 4 g Oral BID DARON Acevedo    diphenhydrAMINE 12 5 mg Oral Q6H PRN Keo Prechtel, DO    heparin (porcine) 5,000 Units Subcutaneous Q8H [address] Katya Pearson PA-C    hydrOXYzine HCL 25 mg Oral Q6H PRN Keo Prechtel, DO    ibuprofen 600 mg Oral Q6H PRN Keo Prechtel, DO    LORazepam 1 mg Intravenous Once [employer], DO    nicotine 1 patch Transdermal Daily Katya Pearson PA-C    ondansetron 4 mg Intravenous Q6H PRN Katya Pearson PA-C    sodium chloride 75 mL/hr Intravenous Continuous Katya Pearson PA-C Last Rate: 75 mL/hr (07/05/19 1655)         VTE Pharmacologic Prophylaxis:   Pharmacologic: Heparin      Current Length of Stay: 4 day(s)    Current Patient Status: Inpatient       Discharge Plan:   Code Status: Level 1 - Full Code           Today, Patient Was Seen By: Shawanda Back DO    ** Please Note: Dictation voice to text software may have been used in the creation of this document   **

## 2019-07-05 NOTE — PLAN OF CARE
Problem: Nutrition/Hydration-ADULT  Goal: Nutrient/Hydration intake appropriate for improving, restoring or maintaining nutritional needs  Description  Monitor and assess patient's nutrition/hydration status for malnutrition (ex- brittle hair, bruises, dry skin, pale skin and conjunctiva, muscle wasting, smooth red tongue, and disorientation)  Collaborate with interdisciplinary team and initiate plan and interventions as ordered  Monitor patient's weight and dietary intake as ordered or per policy  Utilize nutrition screening tool and intervene per policy  Determine patient's food preferences and provide high-protein, high-caloric foods as appropriate  INTERVENTIONS:  - Monitor oral intake, urinary output, labs, and treatment plans  - Assess nutrition and hydration status and recommend course of action  - Evaluate amount of meals eaten  - Assist patient with eating if necessary   - Allow adequate time for meals  - Recommend/ encourage appropriate diets, oral nutritional supplements, and vitamin/mineral supplements  - Order, calculate, and assess calorie counts as needed  - Recommend, monitor, and adjust tube feedings and TPN/PPN based on assessed needs  - Assess need for intravenous fluids  - Provide specific nutrition/hydration education as appropriate  - Include patient/family/caregiver in decisions related to nutrition  Outcome: Progressing     Problem: Potential for Falls  Goal: Patient will remain free of falls  Description  INTERVENTIONS:  - Assess patient frequently for physical needs  -  Identify cognitive and physical deficits and behaviors that affect risk of falls    -  [place] fall precautions as indicated by assessment   - Educate patient/family on patient safety including physical limitations  - Instruct patient to call for assistance with activity based on assessment  - Modify environment to reduce risk of injury  - Consider OT/PT consult to assist with strengthening/mobility  Outcome: Progressing

## 2019-07-05 NOTE — PROGRESS NOTES
Patient Name: Mario Price  Patient MRN: [id number]  Date: 07/05/19  Service: [facility]    Subjective   Mario Price is a 41 y o  male who was admitted with Jaundice  He is stable today  Patient denies: Chest pain, shortness of breath, fever, weight loss, rash, adenopathy  All others negative except as noted in HPI  Objective     Vitals  Blood pressure 109/59, pulse 56, temperature 97 9 °F (36 6 °C), temperature source Tympanic, resp  rate 18, weight 85 7 kg (188 lb 14 9 oz), SpO2 99 %  ROS:  Patient denies: Chest pain, shortness of breath, fever, weight loss, rash, adenopathy  All others negative except as noted in HPI  General: Alert, no apparent distress  Eyes:  scleral icterus  ENT: MMM  Card: RRR no murmur  Lungs: Clear to ascultation b/l  No wheezes, rales, rhonchi  Abdomen: Soft  Nontender  Nondistended  Bowel sounds present and normoactive  No hepatosplenomegaly    Skin:  jaundice  Neuro: Alert and oriented x3    Laboratory Studies  Lab Results   Component Value Date    CREATININE 0 72 07/05/2019    BUN 11 07/05/2019    SODIUM 141 07/05/2019    K 4 0 07/05/2019     07/05/2019    CO2 24 07/05/2019    GLUCOSE 92 08/19/2015    CALCIUM 8 7 07/05/2019    PROT 7 4 08/19/2015    ALKPHOS 179 (H) 07/05/2019    BILITOT 0 32 08/19/2015    AST 1,642 (H) 07/05/2019    ALT 1,517 (H) 07/05/2019     Lab Results   Component Value Date    WBC 5 58 07/05/2019    HGB 13 2 07/05/2019    HCT 40 7 07/05/2019     07/05/2019    MCV 88 07/05/2019     Lab Results   Component Value Date    PROTIME 13 8 07/04/2019    INR 1 05 07/04/2019       Inhouse Medications       Current Facility-Administered Medications:     buprenorphine-naloxone (SUBOXONE) 2-0 5 mg per SL film 2 Film, 2 Film, Sublingual, BID, 2 Film at 07/04/19 1817    cholestyramine sugar free (QUESTRAN LIGHT) packet 4 g, 4 g, Oral, BID, 4 g at 07/04/19 1813    diphenhydrAMINE (BENADRYL) tablet 12 5 mg, 12 5 mg, Oral, Q6H PRN, 12 5 mg at 07/05/19 0315    heparin (porcine) subcutaneous injection 5,000 Units, 5,000 Units, Subcutaneous, Q8H [facility], 5,000 Units at 07/05/19 0604 **AND** [COMPLETED] Platelet count, , , Once    hydrOXYzine HCL (ATARAX) tablet 25 mg, 25 mg, Oral, Q6H PRN, 25 mg at 07/04/19 2215    ibuprofen (MOTRIN) tablet 600 mg, 600 mg, Oral, Q6H PRN, 600 mg at 07/04/19 2308    LORazepam (ATIVAN) 2 mg/mL injection 1 mg, 1 mg, Intravenous, Once    nicotine (NICODERM CQ) 7 mg/24hr TD 24 hr patch 1 patch, 1 patch, Transdermal, Daily    ondansetron (ZOFRAN) injection 4 mg, 4 mg, Intravenous, Q6H PRN    sodium chloride 0 9 % infusion, 75 mL/hr, Intravenous, Continuous, 75 mL/hr at 07/05/19 0312      Assessment/Plan:    I discussed with him today the fact that liver functions are slightly better but we have no specific cause for his liver injury as we are pending hepatitis C result    For further detail please see my quick note from yesterday    Dolores Aragon MD

## 2019-07-06 ENCOUNTER — APPOINTMENT (INPATIENT)
Dept: MRI IMAGING | Facility: HOSPITAL | Age: 39
DRG: 283 | End: 2019-07-06
Payer: COMMERCIAL

## 2019-07-06 LAB
ALBUMIN SERPL BCP-MCNC: 2.6 G/DL (ref 3.5–5)
ALP SERPL-CCNC: 179 U/L (ref 46–116)
ALT SERPL W P-5'-P-CCNC: 1486 U/L (ref 12–78)
ANION GAP SERPL CALCULATED.3IONS-SCNC: 8 MMOL/L (ref 4–13)
AST SERPL W P-5'-P-CCNC: 1664 U/L (ref 5–45)
BASOPHILS # BLD AUTO: 0.05 THOUSANDS/ΜL (ref 0–0.1)
BASOPHILS NFR BLD AUTO: 1 % (ref 0–1)
BILIRUB SERPL-MCNC: 11.57 MG/DL (ref 0.2–1)
BUN SERPL-MCNC: 10 MG/DL (ref 5–25)
CALCIUM SERPL-MCNC: 8.6 MG/DL (ref 8.3–10.1)
CHLORIDE SERPL-SCNC: 107 MMOL/L (ref 100–108)
CO2 SERPL-SCNC: 24 MMOL/L (ref 21–32)
CREAT SERPL-MCNC: 0.69 MG/DL (ref 0.6–1.3)
EOSINOPHIL # BLD AUTO: 0.17 THOUSAND/ΜL (ref 0–0.61)
EOSINOPHIL NFR BLD AUTO: 3 % (ref 0–6)
ERYTHROCYTE [DISTWIDTH] IN BLOOD BY AUTOMATED COUNT: 16.3 % (ref 11.6–15.1)
FERRITIN SERPL-MCNC: 1420 NG/ML (ref 8–388)
GFR SERPL CREATININE-BSD FRML MDRD: 122 ML/MIN/1.73SQ M
GLUCOSE SERPL-MCNC: 85 MG/DL (ref 65–140)
HCT VFR BLD AUTO: 40 % (ref 36.5–49.3)
HGB BLD-MCNC: 13 G/DL (ref 12–17)
IMM GRANULOCYTES # BLD AUTO: 0.06 THOUSAND/UL (ref 0–0.2)
IMM GRANULOCYTES NFR BLD AUTO: 1 % (ref 0–2)
IRON SATN MFR SERPL: 31 %
IRON SERPL-MCNC: 120 UG/DL (ref 65–175)
LYMPHOCYTES # BLD AUTO: 2.59 THOUSANDS/ΜL (ref 0.6–4.47)
LYMPHOCYTES NFR BLD AUTO: 42 % (ref 14–44)
MAGNESIUM SERPL-MCNC: 1.9 MG/DL (ref 1.6–2.6)
MCH RBC QN AUTO: 28.6 PG (ref 26.8–34.3)
MCHC RBC AUTO-ENTMCNC: 32.5 G/DL (ref 31.4–37.4)
MCV RBC AUTO: 88 FL (ref 82–98)
MONOCYTES # BLD AUTO: 0.76 THOUSAND/ΜL (ref 0.17–1.22)
MONOCYTES NFR BLD AUTO: 12 % (ref 4–12)
NEUTROPHILS # BLD AUTO: 2.55 THOUSANDS/ΜL (ref 1.85–7.62)
NEUTS SEG NFR BLD AUTO: 41 % (ref 43–75)
NRBC BLD AUTO-RTO: 0 /100 WBCS
PLATELET # BLD AUTO: 258 THOUSANDS/UL (ref 149–390)
PMV BLD AUTO: 12.3 FL (ref 8.9–12.7)
POTASSIUM SERPL-SCNC: 4 MMOL/L (ref 3.5–5.3)
PROT SERPL-MCNC: 6 G/DL (ref 6.4–8.2)
RBC # BLD AUTO: 4.55 MILLION/UL (ref 3.88–5.62)
SODIUM SERPL-SCNC: 139 MMOL/L (ref 136–145)
TIBC SERPL-MCNC: 381 UG/DL (ref 250–450)
WBC # BLD AUTO: 6.18 THOUSAND/UL (ref 4.31–10.16)

## 2019-07-06 PROCEDURE — 82103 ALPHA-1-ANTITRYPSIN TOTAL: CPT | Performed by: PHYSICIAN ASSISTANT

## 2019-07-06 PROCEDURE — 74181 MRI ABDOMEN W/O CONTRAST: CPT

## 2019-07-06 PROCEDURE — 76376 3D RENDER W/INTRP POSTPROCES: CPT

## 2019-07-06 PROCEDURE — 86038 ANTINUCLEAR ANTIBODIES: CPT | Performed by: PHYSICIAN ASSISTANT

## 2019-07-06 PROCEDURE — A9585 GADOBUTROL INJECTION: HCPCS | Performed by: PHYSICIAN ASSISTANT

## 2019-07-06 PROCEDURE — 86256 FLUORESCENT ANTIBODY TITER: CPT | Performed by: PHYSICIAN ASSISTANT

## 2019-07-06 PROCEDURE — 82728 ASSAY OF FERRITIN: CPT | Performed by: PHYSICIAN ASSISTANT

## 2019-07-06 PROCEDURE — 99232 SBSQ HOSP IP/OBS MODERATE 35: CPT | Performed by: INTERNAL MEDICINE

## 2019-07-06 PROCEDURE — 83540 ASSAY OF IRON: CPT | Performed by: PHYSICIAN ASSISTANT

## 2019-07-06 PROCEDURE — 86235 NUCLEAR ANTIGEN ANTIBODY: CPT | Performed by: PHYSICIAN ASSISTANT

## 2019-07-06 PROCEDURE — 82390 ASSAY OF CERULOPLASMIN: CPT | Performed by: PHYSICIAN ASSISTANT

## 2019-07-06 PROCEDURE — 85025 COMPLETE CBC W/AUTO DIFF WBC: CPT | Performed by: HOSPITALIST

## 2019-07-06 PROCEDURE — 83735 ASSAY OF MAGNESIUM: CPT | Performed by: HOSPITALIST

## 2019-07-06 PROCEDURE — 99232 SBSQ HOSP IP/OBS MODERATE 35: CPT | Performed by: HOSPITALIST

## 2019-07-06 PROCEDURE — 80053 COMPREHEN METABOLIC PANEL: CPT | Performed by: HOSPITALIST

## 2019-07-06 PROCEDURE — 83550 IRON BINDING TEST: CPT | Performed by: PHYSICIAN ASSISTANT

## 2019-07-06 RX ORDER — KETOROLAC TROMETHAMINE 30 MG/ML
15 INJECTION, SOLUTION INTRAMUSCULAR; INTRAVENOUS ONCE
Status: COMPLETED | OUTPATIENT
Start: 2019-07-06 | End: 2019-07-06

## 2019-07-06 RX ADMIN — CHOLESTYRAMINE 4 G: 4 POWDER, FOR SUSPENSION ORAL at 20:17

## 2019-07-06 RX ADMIN — DIPHENHYDRAMINE HCL 12.5 MG: 25 TABLET ORAL at 13:40

## 2019-07-06 RX ADMIN — HEPARIN SODIUM 5000 UNITS: 5000 INJECTION INTRAVENOUS; SUBCUTANEOUS at 22:47

## 2019-07-06 RX ADMIN — CHOLESTYRAMINE 4 G: 4 POWDER, FOR SUSPENSION ORAL at 08:45

## 2019-07-06 RX ADMIN — SODIUM CHLORIDE 75 ML/HR: 0.9 INJECTION, SOLUTION INTRAVENOUS at 20:05

## 2019-07-06 RX ADMIN — KETOROLAC TROMETHAMINE 15 MG: 30 INJECTION, SOLUTION INTRAMUSCULAR at 00:14

## 2019-07-06 RX ADMIN — HEPARIN SODIUM 5000 UNITS: 5000 INJECTION INTRAVENOUS; SUBCUTANEOUS at 05:32

## 2019-07-06 RX ADMIN — LORAZEPAM 1 MG: 2 INJECTION INTRAMUSCULAR; INTRAVENOUS at 15:13

## 2019-07-06 RX ADMIN — SODIUM CHLORIDE 75 ML/HR: 0.9 INJECTION, SOLUTION INTRAVENOUS at 05:32

## 2019-07-06 RX ADMIN — BUPRENORPHINE HYDROCHLORIDE, NALOXONE HYDROCHLORIDE 2 FILM: 2; .5 FILM, SOLUBLE BUCCAL; SUBLINGUAL at 08:47

## 2019-07-06 RX ADMIN — BUPRENORPHINE HYDROCHLORIDE, NALOXONE HYDROCHLORIDE 2 FILM: 2; .5 FILM, SOLUBLE BUCCAL; SUBLINGUAL at 20:18

## 2019-07-06 RX ADMIN — HEPARIN SODIUM 5000 UNITS: 5000 INJECTION INTRAVENOUS; SUBCUTANEOUS at 13:33

## 2019-07-06 RX ADMIN — DIPHENHYDRAMINE HCL 12.5 MG: 25 TABLET ORAL at 22:47

## 2019-07-06 RX ADMIN — GADOBUTROL 8 ML: 604.72 INJECTION INTRAVENOUS at 16:21

## 2019-07-06 NOTE — PROGRESS NOTES
Progress Note - Grahamlucy Sg 1980, 41 y o  male MRN: [id number]    Unit/Bed#: E2 -01 Encounter: [id number]    Primary Care Provider: No primary care provider on file  Date and time admitted to hospital: 2019  6:57 PM        Transaminitis  Assessment & Plan  Going for MRCP today  Spoke with GI, plan liver biopsy for Monday            Subjective:   Feels a little better  A little less itching  No abd pain  Objective:     Vitals:   Temp (24hrs), Av 3 °F (36 8 °C), Min:98 1 °F (36 7 °C), Max:98 5 °F (36 9 °C)    Temp:  [98 1 °F (36 7 °C)-98 5 °F (36 9 °C)] 98 4 °F (36 9 °C)  HR:  [54-62] 58  Resp:  [16-18] 18  BP: (112-130)/(56-73) 114/56  SpO2:  [97 %-98 %] 97 %  Body mass index is 28 73 kg/m²  Input and Output Summary (last 24 hours): Intake/Output Summary (Last 24 hours) at 2019 1439  Last data filed at 2019 0601  Gross per 24 hour   Intake 1163 75 ml   Output --   Net 1163 75 ml       Physical Exam:     Physical Exam   HENT:   Head: Normocephalic and atraumatic  Eyes: Pupils are equal, round, and reactive to light  EOM are normal  Scleral icterus is present  Cardiovascular: Normal rate and regular rhythm  Exam reveals no gallop and no friction rub  No murmur heard  Pulmonary/Chest: Effort normal and breath sounds normal  He has no wheezes  He has no rales  Abdominal: Soft  Bowel sounds are normal  There is no tenderness  Musculoskeletal: He exhibits no edema  Nursing note and vitals reviewed              Additional Data:     Labs:    Results from last 7 days   Lab Units 19  0506   WBC Thousand/uL 6 18   HEMOGLOBIN g/dL 13 0   HEMATOCRIT % 40 0   PLATELETS Thousands/uL 258   NEUTROS PCT % 41*   LYMPHS PCT % 42   MONOS PCT % 12   EOS PCT % 3     Results from last 7 days   Lab Units 19  0506   POTASSIUM mmol/L 4 0   CHLORIDE mmol/L 107   CO2 mmol/L 24   BUN mg/dL 10   CREATININE mg/dL 0 69   CALCIUM mg/dL 8 6   ALK PHOS U/L 179*   ALT U/L 1,486*   AST U/L 1,664*     Results from last 7 days   Lab Units 07/04/19  0459   INR  1 05                   * I Have Reviewed All Lab Data     Recent Cultures (last 7 days):     Results from last 7 days   Lab Units 07/01/19  1934 07/01/19 1923   BLOOD CULTURE  No Growth After 4 Days  No Growth After 4 Days  Last 24 Hours Medication List:     Current Facility-Administered Medications:  buprenorphine-naloxone 2 Film Sublingual BID Katya Pearson PA-C    cholestyramine sugar free 4 g Oral BID DARON William    diphenhydrAMINE 12 5 mg Oral Q6H PRN Keo Prechtel, DO    heparin (porcine) 5,000 Units Subcutaneous Q8H [facility] Katya Pearson PA-C    hydrOXYzine HCL 25 mg Oral Q6H PRN Keo Prechtel, DO    ibuprofen 600 mg Oral Q6H PRN Keo Prechtel, DO    LORazepam 1 mg Intravenous Once [employer],     nicotine 1 patch Transdermal Daily Katya Pearson PA-C    ondansetron 4 mg Intravenous Q6H PRN Katya Pearson PA-C    sodium chloride 75 mL/hr Intravenous Continuous Katya Pearson PA-C Last Rate: 75 mL/hr (07/06/19 0532)         VTE Pharmacologic Prophylaxis:   Pharmacologic: Heparin      Current Length of Stay: 5 day(s)    Current Patient Status: Inpatient       Discharge Plan:   Code Status: Level 1 - Full Code           Today, Patient Was Seen By: Priti Mejias DO    ** Please Note: Dictation voice to text software may have been used in the creation of this document   **

## 2019-07-06 NOTE — PROGRESS NOTES
[facility]  Progress Note - Vicky Bettencourt 41 y o  male MRN: [id number]    Unit/Bed#: E2 -01 Encounter: [id number]    Assessment/Plan:  1  Hepatitis-C positive  2  Itching  3  History of IV drug use  4  Elevated LFTs  5  Jaundice  6  Duodenal nodule   -Ultrasound showed mild hepatomegaly and CBD dilation to 9 mm but was otherwise normal, no evidence of choledocholithiasis was seen  -CT showed possible duodenitis as well as a 1 1 cm ovoid nodule along the wall of the 2nd portion of the duodenum  -LFTs are elevated in a primarily hepatocellular pattern, they have remained stable over the past five days    -It is possible this is secondary to hepatitis-C however given that he is not improving, would recommend a liver biopsy to help obtain a diagnosis  -Recommend workup for other cause of liver disease including autoimmune hepatitis, PBC, Kiel's disease, hemochromatosis and alpha-1 antitrypsin deficiency  -MRCP was ordered to further evaluate the CBD dilation and ensure he does not have choledocholithiasis    -total bilirubin 11 57, AST 1664, ALT 1517, alk-phos 179   -his hepatic synthetic function appears to be intact, his INR and platelets are both within normal limits  No encephalopathy    -he is positive for hepatitis C with a high viral load, his genotype is pending  -he will need treatment for hepatitis C as an outpatient if he does not clear the virus on his own  Recommend rechecking his viral load in 6 months    -his primary complaint today is itching, continue Atarax 25 mg every 6 hours as needed and Benadryl 12 5 mg every 6 hours as needed for itching    -continue to monitor LFTs   -he will need an EGD for further evaluation of the duodenal nodule, timing to be determined based on his clinical course  Subjective:   He reports that he continues to have itching and this is primary complaint  He is tolerating his diet well      Objective:     Vitals: Blood pressure 114/56, pulse 58, temperature 98 4 °F (36 9 °C), temperature source Tympanic, resp  rate 18, weight 85 7 kg (188 lb 14 9 oz), SpO2 97 %  ,Body mass index is 28 73 kg/m²  Intake/Output Summary (Last 24 hours) at 7/6/2019 0956  Last data filed at 7/6/2019 0601  Gross per 24 hour   Intake 1801 25 ml   Output --   Net 1801 25 ml       Review of Systems: as per HPI  Review of Systems   Constitutional: Negative for activity change, appetite change, chills, fatigue, fever and unexpected weight change  HENT: Negative for mouth sores, sore throat and trouble swallowing  Respiratory: Negative for shortness of breath  Cardiovascular: Negative for chest pain  Gastrointestinal: Positive for abdominal pain  Negative for abdominal distention, blood in stool, constipation, diarrhea, nausea and vomiting  Skin: Positive for color change  Negative for pallor, rash and wound  Neurological: Negative for tremors and syncope  All other systems reviewed and are negative  Physical Exam:     Physical Exam   Constitutional: He is oriented to person, place, and time  He appears well-developed and well-nourished  No distress  HENT:   Head: Normocephalic and atraumatic  Eyes: Right eye exhibits no discharge  Left eye exhibits no discharge  Scleral icterus is present  Neck: Neck supple  No tracheal deviation present  Cardiovascular: Normal rate, regular rhythm, normal heart sounds and intact distal pulses  Exam reveals no gallop and no friction rub  No murmur heard  Pulmonary/Chest: Effort normal and breath sounds normal  No respiratory distress  He has no wheezes  He has no rales  He exhibits no tenderness  Abdominal: Soft  Bowel sounds are normal  He exhibits no distension and no mass  There is tenderness (RUQ)  There is no rebound and no guarding  Neurological: He is alert and oriented to person, place, and time  Skin: Skin is warm and dry     Global jaundice, tattoos   Psychiatric: He has a normal mood and affect  Invasive Devices     Peripheral Intravenous Line            Peripheral IV 07/01/19 Right Antecubital 4 days                        CBC:   Lab Results   Component Value Date    WBC 6 18 07/06/2019    HGB 13 0 07/06/2019    HCT 40 0 07/06/2019    MCV 88 07/06/2019     07/06/2019    MCH 28 6 07/06/2019    MCHC 32 5 07/06/2019    RDW 16 3 (H) 07/06/2019    MPV 12 3 07/06/2019    NRBC 0 07/06/2019   ,   CMP:   Lab Results   Component Value Date    K 4 0 07/06/2019     07/06/2019    CO2 24 07/06/2019    BUN 10 07/06/2019    CREATININE 0 69 07/06/2019    CALCIUM 8 6 07/06/2019    AST 1,664 (H) 07/06/2019    ALT 1,486 (H) 07/06/2019    ALKPHOS 179 (H) 07/06/2019    EGFR 122 07/06/2019   ,   Imaging Studies: I have personally reviewed pertinent reports  RIGHT UPPER QUADRANT ULTRASOUND     INDICATION:     assess for cirrhosis, evidence of portal HTN, cbd diliatation or cholelithiasis/cholecystitis  One week of jaundice      COMPARISON:  CT dated 7/1/2019      TECHNIQUE:   Real-time ultrasound of the right upper quadrant was performed with a curvilinear transducer with both volumetric sweeps and still imaging techniques      FINDINGS:     PANCREAS:  Portions of the pancreas are obscured by bowel gas  Visualized portions of the pancreas are unremarkable       AORTA AND IVC:  Visualized portions are normal for patient age      LIVER:  Size:  Mildly enlarged  The liver measures 17 5 cm in the midclavicular line  Contour:  Surface contour is smooth  Parenchyma:  Echogenicity and echotexture are within normal limits  No evidence of suspicious mass  Limited imaging of the main portal vein shows it to be patent and hepatopetal      BILIARY:  Gallbladder demonstrates wall thickening measuring nearly 7 mm  Gallbladder appears partially contracted  No calcified stones identified  Negative sonographic Zaldivar's sign  No intrahepatic biliary dilatation  CBD measures 9 mm    No choledocholithiasis      KIDNEY:   Right kidney measures 10 3 x 4 5 cm  Within normal limits      ASCITES:   None      IMPRESSION:     Contracted gallbladder with thickened wall  No stones      Mild dilatation of the common bile duct measuring 9 mm  No ultrasound evidence of choledocholithiasis      Normal liver echotexture

## 2019-07-06 NOTE — PLAN OF CARE
Problem: Nutrition/Hydration-ADULT  Goal: Nutrient/Hydration intake appropriate for improving, restoring or maintaining nutritional needs  Description  Monitor and assess patient's nutrition/hydration status for malnutrition (ex- brittle hair, bruises, dry skin, pale skin and conjunctiva, muscle wasting, smooth red tongue, and disorientation)  Collaborate with interdisciplinary team and initiate plan and interventions as ordered  Monitor patient's weight and dietary intake as ordered or per policy  Utilize nutrition screening tool and intervene per policy  Determine patient's food preferences and provide high-protein, high-caloric foods as appropriate  INTERVENTIONS:  - Monitor oral intake, urinary output, labs, and treatment plans  - Assess nutrition and hydration status and recommend course of action  - Evaluate amount of meals eaten  - Assist patient with eating if necessary   - Allow adequate time for meals  - Recommend/ encourage appropriate diets, oral nutritional supplements, and vitamin/mineral supplements  - Order, calculate, and assess calorie counts as needed  - Recommend, monitor, and adjust tube feedings and TPN/PPN based on assessed needs  - Assess need for intravenous fluids  - Provide specific nutrition/hydration education as appropriate  - Include patient/family/caregiver in decisions related to nutrition  Outcome: Progressing     Problem: Potential for Falls  Goal: Patient will remain free of falls  Description  INTERVENTIONS:  - Assess patient frequently for physical needs  -  Identify cognitive and physical deficits and behaviors that affect risk of falls    -  [place] fall precautions as indicated by assessment   - Educate patient/family on patient safety including physical limitations  - Instruct patient to call for assistance with activity based on assessment  - Modify environment to reduce risk of injury  - Consider OT/PT consult to assist with strengthening/mobility  Outcome: Progressing     Problem: PAIN - ADULT  Goal: Verbalizes/displays adequate comfort level or baseline comfort level  Description  Interventions:  - Encourage patient to monitor pain and request assistance  - Assess pain using appropriate pain scale  - Administer analgesics based on type and severity of pain and evaluate response  - Implement non-pharmacological measures as appropriate and evaluate response  - Consider cultural and social influences on pain and pain management  - Notify physician/advanced practitioner if interventions unsuccessful or patient reports new pain  Outcome: Progressing     Problem: INFECTION - ADULT  Goal: Absence or prevention of progression during hospitalization  Description  INTERVENTIONS:  - Assess and monitor for signs and symptoms of infection  - Monitor lab/diagnostic results  - Monitor all insertion sites, i e  indwelling lines, tubes, and drains  - Monitor endotracheal (as able) and nasal secretions for changes in amount and color  - [place] appropriate cooling/warming therapies per order  - Administer medications as ordered  - Instruct and encourage patient and family to use good hand hygiene technique  - Identify and instruct in appropriate isolation precautions for identified infection/condition  Outcome: Progressing     Problem: DISCHARGE PLANNING  Goal: Discharge to home or other facility with appropriate resources  Description  INTERVENTIONS:  - Identify barriers to discharge w/patient and caregiver  - Arrange for needed discharge resources and transportation as appropriate  - Identify discharge learning needs (meds, wound care, etc )  - Arrange for interpretive services to assist at discharge as needed  - Refer to Case Management Department for coordinating discharge planning if the patient needs post-hospital services based on physician/advanced practitioner order or complex needs related to functional status, cognitive ability, or social support system  Outcome: Progressing     Problem: Knowledge Deficit  Goal: Patient/family/caregiver demonstrates understanding of disease process, treatment plan, medications, and discharge instructions  Description  Complete learning assessment and assess knowledge base    Interventions:  - Provide teaching at level of understanding  - Provide teaching via preferred learning methods  Outcome: Progressing     Problem: METABOLIC, FLUID AND ELECTROLYTES - ADULT  Goal: Electrolytes maintained within normal limits  Description  INTERVENTIONS:  - Monitor labs and assess patient for signs and symptoms of electrolyte imbalances  - Administer electrolyte replacement as ordered  - Monitor response to electrolyte replacements, including repeat lab results as appropriate  - Instruct patient on fluid and nutrition as appropriate  Outcome: Progressing

## 2019-07-06 NOTE — PLAN OF CARE
Problem: Nutrition/Hydration-ADULT  Goal: Nutrient/Hydration intake appropriate for improving, restoring or maintaining nutritional needs  Description  Monitor and assess patient's nutrition/hydration status for malnutrition (ex- brittle hair, bruises, dry skin, pale skin and conjunctiva, muscle wasting, smooth red tongue, and disorientation)  Collaborate with interdisciplinary team and initiate plan and interventions as ordered  Monitor patient's weight and dietary intake as ordered or per policy  Utilize nutrition screening tool and intervene per policy  Determine patient's food preferences and provide high-protein, high-caloric foods as appropriate  INTERVENTIONS:  - Monitor oral intake, urinary output, labs, and treatment plans  - Assess nutrition and hydration status and recommend course of action  - Evaluate amount of meals eaten  - Assist patient with eating if necessary   - Allow adequate time for meals  - Recommend/ encourage appropriate diets, oral nutritional supplements, and vitamin/mineral supplements  - Order, calculate, and assess calorie counts as needed  - Recommend, monitor, and adjust tube feedings and TPN/PPN based on assessed needs  - Assess need for intravenous fluids  - Provide specific nutrition/hydration education as appropriate  - Include patient/family/caregiver in decisions related to nutrition  Outcome: Progressing     Problem: Potential for Falls  Goal: Patient will remain free of falls  Description  INTERVENTIONS:  - Assess patient frequently for physical needs  -  Identify cognitive and physical deficits and behaviors that affect risk of falls    -  [place] fall precautions as indicated by assessment   - Educate patient/family on patient safety including physical limitations  - Instruct patient to call for assistance with activity based on assessment  - Modify environment to reduce risk of injury  - Consider OT/PT consult to assist with strengthening/mobility  Outcome: Progressing     Problem: PAIN - ADULT  Goal: Verbalizes/displays adequate comfort level or baseline comfort level  Description  Interventions:  - Encourage patient to monitor pain and request assistance  - Assess pain using appropriate pain scale  - Administer analgesics based on type and severity of pain and evaluate response  - Implement non-pharmacological measures as appropriate and evaluate response  - Consider cultural and social influences on pain and pain management  - Notify physician/advanced practitioner if interventions unsuccessful or patient reports new pain  Outcome: Progressing     Problem: INFECTION - ADULT  Goal: Absence or prevention of progression during hospitalization  Description  INTERVENTIONS:  - Assess and monitor for signs and symptoms of infection  - Monitor lab/diagnostic results  - Monitor all insertion sites, i e  indwelling lines, tubes, and drains  - Monitor endotracheal (as able) and nasal secretions for changes in amount and color  - [place] appropriate cooling/warming therapies per order  - Administer medications as ordered  - Instruct and encourage patient and family to use good hand hygiene technique  - Identify and instruct in appropriate isolation precautions for identified infection/condition  Outcome: Progressing     Problem: DISCHARGE PLANNING  Goal: Discharge to home or other facility with appropriate resources  Description  INTERVENTIONS:  - Identify barriers to discharge w/patient and caregiver  - Arrange for needed discharge resources and transportation as appropriate  - Identify discharge learning needs (meds, wound care, etc )  - Arrange for interpretive services to assist at discharge as needed  - Refer to Case Management Department for coordinating discharge planning if the patient needs post-hospital services based on physician/advanced practitioner order or complex needs related to functional status, cognitive ability, or social support system  Outcome: Progressing     Problem: Knowledge Deficit  Goal: Patient/family/caregiver demonstrates understanding of disease process, treatment plan, medications, and discharge instructions  Description  Complete learning assessment and assess knowledge base    Interventions:  - Provide teaching at level of understanding  - Provide teaching via preferred learning methods  Outcome: Progressing

## 2019-07-07 LAB
ALBUMIN SERPL BCP-MCNC: 2.7 G/DL (ref 3.5–5)
ALP SERPL-CCNC: 180 U/L (ref 46–116)
ALT SERPL W P-5'-P-CCNC: 1241 U/L (ref 12–78)
ANION GAP SERPL CALCULATED.3IONS-SCNC: 8 MMOL/L (ref 4–13)
AST SERPL W P-5'-P-CCNC: 1193 U/L (ref 5–45)
BACTERIA BLD CULT: NORMAL
BACTERIA BLD CULT: NORMAL
BILIRUB SERPL-MCNC: 11.63 MG/DL (ref 0.2–1)
BUN SERPL-MCNC: 11 MG/DL (ref 5–25)
CALCIUM SERPL-MCNC: 8.7 MG/DL (ref 8.3–10.1)
CHLORIDE SERPL-SCNC: 103 MMOL/L (ref 100–108)
CO2 SERPL-SCNC: 25 MMOL/L (ref 21–32)
CREAT SERPL-MCNC: 0.63 MG/DL (ref 0.6–1.3)
GFR SERPL CREATININE-BSD FRML MDRD: 127 ML/MIN/1.73SQ M
GLUCOSE SERPL-MCNC: 92 MG/DL (ref 65–140)
POTASSIUM SERPL-SCNC: 3.8 MMOL/L (ref 3.5–5.3)
PROT SERPL-MCNC: 6.4 G/DL (ref 6.4–8.2)
SODIUM SERPL-SCNC: 136 MMOL/L (ref 136–145)

## 2019-07-07 PROCEDURE — 99232 SBSQ HOSP IP/OBS MODERATE 35: CPT | Performed by: INTERNAL MEDICINE

## 2019-07-07 PROCEDURE — 99232 SBSQ HOSP IP/OBS MODERATE 35: CPT | Performed by: HOSPITALIST

## 2019-07-07 PROCEDURE — 80053 COMPREHEN METABOLIC PANEL: CPT | Performed by: HOSPITALIST

## 2019-07-07 RX ORDER — METOCLOPRAMIDE HYDROCHLORIDE 5 MG/ML
10 INJECTION INTRAMUSCULAR; INTRAVENOUS EVERY 6 HOURS PRN
Status: DISCONTINUED | OUTPATIENT
Start: 2019-07-07 | End: 2019-07-07

## 2019-07-07 RX ORDER — KETOROLAC TROMETHAMINE 30 MG/ML
30 INJECTION, SOLUTION INTRAMUSCULAR; INTRAVENOUS EVERY 6 HOURS PRN
Status: DISCONTINUED | OUTPATIENT
Start: 2019-07-07 | End: 2019-07-07

## 2019-07-07 RX ORDER — METOCLOPRAMIDE HYDROCHLORIDE 5 MG/ML
10 INJECTION INTRAMUSCULAR; INTRAVENOUS EVERY 6 HOURS PRN
Status: DISCONTINUED | OUTPATIENT
Start: 2019-07-07 | End: 2019-07-09 | Stop reason: HOSPADM

## 2019-07-07 RX ORDER — NALTREXONE HYDROCHLORIDE 50 MG/1
25 TABLET, FILM COATED ORAL DAILY
Status: DISCONTINUED | OUTPATIENT
Start: 2019-07-07 | End: 2019-07-08 | Stop reason: ALTCHOICE

## 2019-07-07 RX ORDER — KETOROLAC TROMETHAMINE 30 MG/ML
30 INJECTION, SOLUTION INTRAMUSCULAR; INTRAVENOUS EVERY 6 HOURS PRN
Status: COMPLETED | OUTPATIENT
Start: 2019-07-07 | End: 2019-07-08

## 2019-07-07 RX ADMIN — HYDROXYZINE HYDROCHLORIDE 25 MG: 25 TABLET ORAL at 02:17

## 2019-07-07 RX ADMIN — BUPRENORPHINE HYDROCHLORIDE, NALOXONE HYDROCHLORIDE 2 FILM: 2; .5 FILM, SOLUBLE BUCCAL; SUBLINGUAL at 09:00

## 2019-07-07 RX ADMIN — METOCLOPRAMIDE 10 MG: 5 INJECTION, SOLUTION INTRAMUSCULAR; INTRAVENOUS at 19:57

## 2019-07-07 RX ADMIN — HEPARIN SODIUM 5000 UNITS: 5000 INJECTION INTRAVENOUS; SUBCUTANEOUS at 05:26

## 2019-07-07 RX ADMIN — NALTREXONE HYDROCHLORIDE 25 MG: 50 TABLET, FILM COATED ORAL at 16:56

## 2019-07-07 RX ADMIN — SODIUM CHLORIDE 75 ML/HR: 0.9 INJECTION, SOLUTION INTRAVENOUS at 07:32

## 2019-07-07 RX ADMIN — ONDANSETRON 4 MG: 2 INJECTION INTRAMUSCULAR; INTRAVENOUS at 18:50

## 2019-07-07 RX ADMIN — DIPHENHYDRAMINE HCL 12.5 MG: 25 TABLET ORAL at 09:01

## 2019-07-07 RX ADMIN — HYDROXYZINE HYDROCHLORIDE 25 MG: 25 TABLET ORAL at 17:11

## 2019-07-07 RX ADMIN — KETOROLAC TROMETHAMINE 30 MG: 30 INJECTION, SOLUTION INTRAMUSCULAR at 19:57

## 2019-07-07 RX ADMIN — CHOLESTYRAMINE 4 G: 4 POWDER, FOR SUSPENSION ORAL at 09:00

## 2019-07-07 RX ADMIN — HEPARIN SODIUM 5000 UNITS: 5000 INJECTION INTRAVENOUS; SUBCUTANEOUS at 14:09

## 2019-07-07 NOTE — PROGRESS NOTES
Progress Note - Vicky Lopezallen 1980, 41 y o  male MRN: [id number]    Unit/Bed#: E2 -01 Encounter: [id number]    Primary Care Provider: No primary care provider on file  Date and time admitted to hospital: 2019  6:57 PM        Transaminitis  Assessment & Plan  Acute hepatitis  He is hep C positive which is a new finding, but this does not usually cause such an acute hepatitis per GI  Had MRCP  For liver biopsy tomorrow  LFTs are finally starting to trend down      * Jaundice  Assessment & Plan  Secondary to acute hepatitis  Appreciate GI help  Hepatitis C is positive    History of IV drug use  Assessment & Plan  On suboxone          Subjective:   Feels a little better  Biggest complaint is itching, but it is slightly improved  Objective:     Vitals:   Temp (24hrs), Av 5 °F (36 9 °C), Min:98 °F (36 7 °C), Max:99 °F (37 2 °C)    Temp:  [98 °F (36 7 °C)-99 °F (37 2 °C)] 99 °F (37 2 °C)  HR:  [62] 62  Resp:  [16-18] 16  BP: (113-120)/(66-68) 113/66  SpO2:  [96 %-99 %] 96 %  Body mass index is 28 73 kg/m²  Input and Output Summary (last 24 hours): Intake/Output Summary (Last 24 hours) at 2019 1423  Last data filed at 2019  Gross per 24 hour   Intake 1223 75 ml   Output --   Net 1223 75 ml       Physical Exam:     Physical Exam   HENT:   Head: Normocephalic and atraumatic  Eyes: Pupils are equal, round, and reactive to light  EOM are normal  Scleral icterus is present  Cardiovascular: Normal rate and regular rhythm  Exam reveals no gallop and no friction rub  No murmur heard  Pulmonary/Chest: Effort normal and breath sounds normal  He has no wheezes  He has no rales  Abdominal: Soft  Bowel sounds are normal  There is no tenderness  Musculoskeletal: He exhibits no edema  Skin:   Improving skin yellowing     Nursing note and vitals reviewed              Additional Data:     Labs:    Results from last 7 days   Lab Units 19  0506   WBC Thousand/uL 6 18   HEMOGLOBIN g/dL 13 0   HEMATOCRIT % 40 0   PLATELETS Thousands/uL 258   NEUTROS PCT % 41*   LYMPHS PCT % 42   MONOS PCT % 12   EOS PCT % 3     Results from last 7 days   Lab Units 07/07/19  0525   POTASSIUM mmol/L 3 8   CHLORIDE mmol/L 103   CO2 mmol/L 25   BUN mg/dL 11   CREATININE mg/dL 0 63   CALCIUM mg/dL 8 7   ALK PHOS U/L 180*   ALT U/L 1,241*   AST U/L 1,193*     Results from last 7 days   Lab Units 07/04/19  0459   INR  1 05                   * I Have Reviewed All Lab Data     Recent Cultures (last 7 days):     Results from last 7 days   Lab Units 07/01/19  1934 07/01/19  1923   BLOOD CULTURE  No Growth After 5 Days  No Growth After 5 Days  Last 24 Hours Medication List:     Current Facility-Administered Medications:  buprenorphine-naloxone 2 Film Sublingual BID Katya Pearson PA-C    cholestyramine sugar free 4 g Oral BID DARON Gomez    diphenhydrAMINE 12 5 mg Oral Q6H PRN Keo Prechtel, DO    heparin (porcine) 5,000 Units Subcutaneous Q8H [facility] Katya Pearson PA-C    hydrOXYzine HCL 25 mg Oral Q6H PRN Keo Prechtel, DO    ibuprofen 600 mg Oral Q6H PRN Gonzales Second, DO    nicotine 1 patch Transdermal Daily Katya Pearson PA-C    ondansetron 4 mg Intravenous Q6H PRN Katya Pearson PA-C    sodium chloride 75 mL/hr Intravenous Continuous Katya Pearson PA-C Last Rate: 75 mL/hr (07/07/19 0732)         VTE Pharmacologic Prophylaxis:   Pharmacologic: Heparin      Current Length of Stay: 6 day(s)    Current Patient Status: Inpatient       Discharge Plan: home when ok with GI  Hopefully mid week    Code Status: Level 1 - Full Code           Today, Patient Was Seen By: Colleen Bray DO    ** Please Note: Dictation voice to text software may have been used in the creation of this document   **

## 2019-07-07 NOTE — ASSESSMENT & PLAN NOTE
Acute hepatitis  He is hep C positive which is a new finding, but this does not usually cause such an acute hepatitis per GI  Had MRCP  For liver biopsy tomorrow  LFTs are finally starting to trend down

## 2019-07-07 NOTE — PLAN OF CARE
Problem: Nutrition/Hydration-ADULT  Goal: Nutrient/Hydration intake appropriate for improving, restoring or maintaining nutritional needs  Description  Monitor and assess patient's nutrition/hydration status for malnutrition (ex- brittle hair, bruises, dry skin, pale skin and conjunctiva, muscle wasting, smooth red tongue, and disorientation)  Collaborate with interdisciplinary team and initiate plan and interventions as ordered  Monitor patient's weight and dietary intake as ordered or per policy  Utilize nutrition screening tool and intervene per policy  Determine patient's food preferences and provide high-protein, high-caloric foods as appropriate  INTERVENTIONS:  - Monitor oral intake, urinary output, labs, and treatment plans  - Assess nutrition and hydration status and recommend course of action  - Evaluate amount of meals eaten  - Assist patient with eating if necessary   - Allow adequate time for meals  - Recommend/ encourage appropriate diets, oral nutritional supplements, and vitamin/mineral supplements  - Order, calculate, and assess calorie counts as needed  - Recommend, monitor, and adjust tube feedings and TPN/PPN based on assessed needs  - Assess need for intravenous fluids  - Provide specific nutrition/hydration education as appropriate  - Include patient/family/caregiver in decisions related to nutrition  Outcome: Progressing     Problem: Potential for Falls  Goal: Patient will remain free of falls  Description  INTERVENTIONS:  - Assess patient frequently for physical needs  -  Identify cognitive and physical deficits and behaviors that affect risk of falls    -  [place] fall precautions as indicated by assessment   - Educate patient/family on patient safety including physical limitations  - Instruct patient to call for assistance with activity based on assessment  - Modify environment to reduce risk of injury  - Consider OT/PT consult to assist with strengthening/mobility  Outcome: Progressing     Problem: PAIN - ADULT  Goal: Verbalizes/displays adequate comfort level or baseline comfort level  Description  Interventions:  - Encourage patient to monitor pain and request assistance  - Assess pain using appropriate pain scale  - Administer analgesics based on type and severity of pain and evaluate response  - Implement non-pharmacological measures as appropriate and evaluate response  - Consider cultural and social influences on pain and pain management  - Notify physician/advanced practitioner if interventions unsuccessful or patient reports new pain  Outcome: Progressing     Problem: INFECTION - ADULT  Goal: Absence or prevention of progression during hospitalization  Description  INTERVENTIONS:  - Assess and monitor for signs and symptoms of infection  - Monitor lab/diagnostic results  - Monitor all insertion sites, i e  indwelling lines, tubes, and drains  - Monitor endotracheal (as able) and nasal secretions for changes in amount and color  - [place] appropriate cooling/warming therapies per order  - Administer medications as ordered  - Instruct and encourage patient and family to use good hand hygiene technique  - Identify and instruct in appropriate isolation precautions for identified infection/condition  Outcome: Progressing     Problem: DISCHARGE PLANNING  Goal: Discharge to home or other facility with appropriate resources  Description  INTERVENTIONS:  - Identify barriers to discharge w/patient and caregiver  - Arrange for needed discharge resources and transportation as appropriate  - Identify discharge learning needs (meds, wound care, etc )  - Arrange for interpretive services to assist at discharge as needed  - Refer to Case Management Department for coordinating discharge planning if the patient needs post-hospital services based on physician/advanced practitioner order or complex needs related to functional status, cognitive ability, or social support system  Outcome: Progressing     Problem: Knowledge Deficit  Goal: Patient/family/caregiver demonstrates understanding of disease process, treatment plan, medications, and discharge instructions  Description  Complete learning assessment and assess knowledge base    Interventions:  - Provide teaching at level of understanding  - Provide teaching via preferred learning methods  Outcome: Progressing     Problem: METABOLIC, FLUID AND ELECTROLYTES - ADULT  Goal: Electrolytes maintained within normal limits  Description  INTERVENTIONS:  - Monitor labs and assess patient for signs and symptoms of electrolyte imbalances  - Administer electrolyte replacement as ordered  - Monitor response to electrolyte replacements, including repeat lab results as appropriate  - Instruct patient on fluid and nutrition as appropriate  Outcome: Progressing

## 2019-07-07 NOTE — PROGRESS NOTES
[facility]  Progress Note - Vicky Bettencourt 41 y o  male MRN: [id number]    Unit/Bed#: E2 -01 Encounter: [id number]    Assessment/Plan:  1  Hepatitis-C positive  2  Itching  3  History of IV drug use  4  Elevated LFTs  5  Jaundice  6  Duodenal nodule              -Ultrasound showed mild hepatomegaly and CBD dilation to 9 mm but was otherwise normal, no evidence of choledocholithiasis was seen  -CT showed possible duodenitis as well as a 1 1 cm ovoid nodule along the wall of the 2nd portion of the duodenum  -LFTs are elevated in a primarily hepatocellular pattern, they have remained stable over the past five days               -It is possible this is secondary to hepatitis-C however given that he is not improving, would recommend a liver biopsy to help obtain a diagnosis  This is planned for Monday               -Workup for other cause of liver disease including autoimmune hepatitis, PBC, Kiel's disease, alpha-1 antitrypsin deficiency pending  Iron sat 31%  -MRCP showed normal CBD without evidence of stones  Hepatosplenomegaly seen               -total bilirubin 11 63, AST 1193, ALT 1241, alk-phos 179              -No evidence of acute liver failure  His hepatic synthetic function appears to be intact, his INR and platelets are both within normal limits  No encephalopathy               -he is positive for hepatitis C with a high viral load, his genotype is pending                -he will need followup for considerationg of treatment for hepatitis C as an outpatient               -his primary complaint is itching, continue Atarax 25 mg every 6 hours as needed and Benadryl 12 5 mg every 6 hours as needed for itching               -continue to monitor LFTs              -he will need an EGD for further evaluation of the duodenal nodule, possibly this week or as an outpatient    -If symptoms/findings do not improve, he may need referral to a liver transplant center  Subjective:   He states he is feeling no different today, he denies any new complaints  He states he wants to go home  Objective:     Vitals: Blood pressure 113/66, pulse 62, temperature 99 °F (37 2 °C), temperature source Tympanic, resp  rate 16, height 5' 8" (1 727 m), weight 85 7 kg (188 lb 14 9 oz), SpO2 96 %  ,Body mass index is 28 73 kg/m²  Intake/Output Summary (Last 24 hours) at 7/7/2019 1112  Last data filed at 7/6/2019 2005  Gross per 24 hour   Intake 1223 75 ml   Output --   Net 1223 75 ml       Review of Systems: as per HPI  Review of Systems   Constitutional: Negative for activity change, appetite change, chills, fatigue, fever and unexpected weight change  HENT: Negative for mouth sores, sore throat and trouble swallowing  Respiratory: Negative for shortness of breath  Cardiovascular: Negative for chest pain  Gastrointestinal: Negative for abdominal distention, abdominal pain, blood in stool, constipation, diarrhea, nausea and vomiting  Skin: Positive for color change  Negative for pallor, rash and wound  Neurological: Negative for tremors and syncope  All other systems reviewed and are negative  Physical Exam:     Physical Exam   Constitutional: He is oriented to person, place, and time  He appears well-developed and well-nourished  No distress  HENT:   Head: Normocephalic and atraumatic  Eyes: Right eye exhibits no discharge  Left eye exhibits no discharge  Scleral icterus is present  Neck: Neck supple  No tracheal deviation present  Cardiovascular: Normal rate, regular rhythm, normal heart sounds and intact distal pulses  Exam reveals no gallop and no friction rub  No murmur heard  Pulmonary/Chest: Effort normal and breath sounds normal  No respiratory distress  He has no wheezes  He has no rales  He exhibits no tenderness  Abdominal: Soft  Bowel sounds are normal  He exhibits no distension and no mass  There is no tenderness   There is no rebound and no guarding  Neurological: He is alert and oriented to person, place, and time  Skin: Skin is warm and dry  Global jaundice   Psychiatric: He has a normal mood and affect  Invasive Devices     Peripheral Intravenous Line            Peripheral IV 07/06/19 Left Antecubital less than 1 day                        CBC: No results found for: WBC, HGB, HCT, MCV, PLT, ADJUSTEDWBC, MCH, MCHC, RDW, MPV, NRBC,   CMP:   Lab Results   Component Value Date    K 3 8 07/07/2019     07/07/2019    CO2 25 07/07/2019    BUN 11 07/07/2019    CREATININE 0 63 07/07/2019    CALCIUM 8 7 07/07/2019    AST 1,193 (H) 07/07/2019    ALT 1,241 (H) 07/07/2019    ALKPHOS 180 (H) 07/07/2019    EGFR 127 07/07/2019   ,   Imaging Studies: I have personally reviewed pertinent reports  MRI OF THE ABDOMEN WITHOUT CONTRAST WITH MRCP     INDICATION: Elevated liver function tests      COMPARISON: Right upper quadrant ultrasound dated July 2, 2019  Prior CT dated July 1, 2019      TECHNIQUE:  The following pulse sequences were obtained: 3D MRCP with radial thick slabs and projections  Coronal and axial T2 with TE of 90 and 180 respectively, axial T1-weighted in-and-out-of phase, axial DWI/ADC, axial T2 with fat saturation, axial   FIESTA fat-sat, and axial T1 with fat saturation  3D rendering was performed from the acquisition scanner         Evaluation of the solid abdominal viscera is limited without intravenous contrast      FINDINGS:     BILIARY/PANCREATIC DUCTS:  MRCP is limited by motion artifact  No intra-hepatic biliary ductal dilatation  Common bile duct is normal in caliber  No evidence of bile duct stricture or choledocholithiasis  No mass identified      LIVER:  Enlarged measuring more than 20 cm craniocaudally  No significant dropout of signal on T1 out of phase series to suggest fatty infiltration  No focal liver mass identified      GALLBLADDER:  Gallbladder is contracted   Gallbladder wall is prominent at 3 mm but not thickened  No obvious filling defects      PANCREAS:  Normal      ADRENAL GLANDS:  Normal      SPLEEN: Enlarged measuring 15 cm      KIDNEYS:  Normal      ABDOMINAL CAVITY:  Small amounts of ascites in the right paracolic gutter  Inflammatory changes adjacent to the second portion of duodenum as described on previous CT      BOWEL:  The wall of the second portion of duodenum is focally thickened at the same location as the "nodule" described on the prior CT exam best seen on image 17/350     OSSEOUS STRUCTURES:  No osseous destruction      VASCULAR STRUCTURES:  No aneurysm      ABDOMINAL WALL:  Normal      LOWER CHEST:  Unremarkable MRI appearance         IMPRESSION:  1  The MRCP is limited by motion artifact  The intrahepatic and extrahepatic bile duct however appear normal caliber on today's MRI  Previous common bile duct dilatation identified on ultrasound is not confirmed on this exam  No obvious filling defects   are seen      2   Enlarged liver without evidence of fatty infiltration      3  Enlarged spleen     4  Contracted gallbladder with top normal thickness gallbladder wall, this is nonspecific in the setting of both ascites and incomplete distention  If there is clinical suspicion for cholecystitis: Consider HIDA scan      5  Inflammatory changes adjacent to second portion of duodenum extending into the right paracolic gutter most likely secondary to duodenitis  There is once again a focal thickening of the duodenal wall/"nodule" which was described on the previous CT exam   and is unchanged today  Consider endoscopic ultrasound for further evaluation

## 2019-07-08 LAB
ALBUMIN SERPL BCP-MCNC: 2.8 G/DL (ref 3.5–5)
ALP SERPL-CCNC: 200 U/L (ref 46–116)
ALT SERPL W P-5'-P-CCNC: 1074 U/L (ref 12–78)
ANION GAP SERPL CALCULATED.3IONS-SCNC: 9 MMOL/L (ref 4–13)
AST SERPL W P-5'-P-CCNC: 867 U/L (ref 5–45)
BASOPHILS # BLD AUTO: 0.04 THOUSANDS/ΜL (ref 0–0.1)
BASOPHILS NFR BLD AUTO: 0 % (ref 0–1)
BILIRUB SERPL-MCNC: 11.32 MG/DL (ref 0.2–1)
BUN SERPL-MCNC: 11 MG/DL (ref 5–25)
CALCIUM SERPL-MCNC: 8.9 MG/DL (ref 8.3–10.1)
CHLORIDE SERPL-SCNC: 106 MMOL/L (ref 100–108)
CO2 SERPL-SCNC: 26 MMOL/L (ref 21–32)
CREAT SERPL-MCNC: 0.79 MG/DL (ref 0.6–1.3)
EOSINOPHIL # BLD AUTO: 0.02 THOUSAND/ΜL (ref 0–0.61)
EOSINOPHIL NFR BLD AUTO: 0 % (ref 0–6)
ERYTHROCYTE [DISTWIDTH] IN BLOOD BY AUTOMATED COUNT: 16.8 % (ref 11.6–15.1)
GFR SERPL CREATININE-BSD FRML MDRD: 116 ML/MIN/1.73SQ M
GLUCOSE SERPL-MCNC: 117 MG/DL (ref 65–140)
HCT VFR BLD AUTO: 41.3 % (ref 36.5–49.3)
HCV GENTYP SERPL NAA+PROBE: NORMAL
HCV PLEASE NOTE: NORMAL
HGB BLD-MCNC: 13.9 G/DL (ref 12–17)
IMM GRANULOCYTES # BLD AUTO: 0.08 THOUSAND/UL (ref 0–0.2)
IMM GRANULOCYTES NFR BLD AUTO: 1 % (ref 0–2)
LYMPHOCYTES # BLD AUTO: 2.8 THOUSANDS/ΜL (ref 0.6–4.47)
LYMPHOCYTES NFR BLD AUTO: 27 % (ref 14–44)
MAGNESIUM SERPL-MCNC: 1.8 MG/DL (ref 1.6–2.6)
MCH RBC QN AUTO: 28.7 PG (ref 26.8–34.3)
MCHC RBC AUTO-ENTMCNC: 33.7 G/DL (ref 31.4–37.4)
MCV RBC AUTO: 85 FL (ref 82–98)
MONOCYTES # BLD AUTO: 0.68 THOUSAND/ΜL (ref 0.17–1.22)
MONOCYTES NFR BLD AUTO: 7 % (ref 4–12)
NEUTROPHILS # BLD AUTO: 6.66 THOUSANDS/ΜL (ref 1.85–7.62)
NEUTS SEG NFR BLD AUTO: 65 % (ref 43–75)
NRBC BLD AUTO-RTO: 0 /100 WBCS
PLATELET # BLD AUTO: 324 THOUSANDS/UL (ref 149–390)
PMV BLD AUTO: 12.1 FL (ref 8.9–12.7)
POTASSIUM SERPL-SCNC: 3.9 MMOL/L (ref 3.5–5.3)
PROT SERPL-MCNC: 6.8 G/DL (ref 6.4–8.2)
RBC # BLD AUTO: 4.84 MILLION/UL (ref 3.88–5.62)
RYE IGE QN: NEGATIVE
SODIUM SERPL-SCNC: 141 MMOL/L (ref 136–145)
WBC # BLD AUTO: 10.28 THOUSAND/UL (ref 4.31–10.16)

## 2019-07-08 PROCEDURE — 80053 COMPREHEN METABOLIC PANEL: CPT | Performed by: HOSPITALIST

## 2019-07-08 PROCEDURE — 99232 SBSQ HOSP IP/OBS MODERATE 35: CPT | Performed by: INTERNAL MEDICINE

## 2019-07-08 PROCEDURE — 83735 ASSAY OF MAGNESIUM: CPT | Performed by: HOSPITALIST

## 2019-07-08 PROCEDURE — 99232 SBSQ HOSP IP/OBS MODERATE 35: CPT | Performed by: FAMILY MEDICINE

## 2019-07-08 PROCEDURE — 85025 COMPLETE CBC W/AUTO DIFF WBC: CPT | Performed by: HOSPITALIST

## 2019-07-08 RX ORDER — KETOROLAC TROMETHAMINE 30 MG/ML
30 INJECTION, SOLUTION INTRAMUSCULAR; INTRAVENOUS EVERY 6 HOURS SCHEDULED
Status: DISCONTINUED | OUTPATIENT
Start: 2019-07-08 | End: 2019-07-09

## 2019-07-08 RX ORDER — KETOROLAC TROMETHAMINE 30 MG/ML
30 INJECTION, SOLUTION INTRAMUSCULAR; INTRAVENOUS EVERY 6 HOURS SCHEDULED
Status: DISCONTINUED | OUTPATIENT
Start: 2019-07-08 | End: 2019-07-08

## 2019-07-08 RX ADMIN — SODIUM CHLORIDE 75 ML/HR: 0.9 INJECTION, SOLUTION INTRAVENOUS at 08:35

## 2019-07-08 RX ADMIN — HEPARIN SODIUM 5000 UNITS: 5000 INJECTION INTRAVENOUS; SUBCUTANEOUS at 21:37

## 2019-07-08 RX ADMIN — KETOROLAC TROMETHAMINE 30 MG: 30 INJECTION, SOLUTION INTRAMUSCULAR at 06:10

## 2019-07-08 RX ADMIN — IBUPROFEN 600 MG: 600 TABLET ORAL at 10:27

## 2019-07-08 RX ADMIN — BUPRENORPHINE HYDROCHLORIDE, NALOXONE HYDROCHLORIDE 2 FILM: 2; .5 FILM, SOLUBLE BUCCAL; SUBLINGUAL at 10:57

## 2019-07-08 RX ADMIN — CHOLESTYRAMINE 4 G: 4 POWDER, FOR SUSPENSION ORAL at 17:12

## 2019-07-08 RX ADMIN — KETOROLAC TROMETHAMINE 30 MG: 30 INJECTION, SOLUTION INTRAMUSCULAR at 14:32

## 2019-07-08 RX ADMIN — SODIUM CHLORIDE 75 ML/HR: 0.9 INJECTION, SOLUTION INTRAVENOUS at 22:08

## 2019-07-08 RX ADMIN — METOCLOPRAMIDE 10 MG: 5 INJECTION, SOLUTION INTRAMUSCULAR; INTRAVENOUS at 18:00

## 2019-07-08 RX ADMIN — BUPRENORPHINE HYDROCHLORIDE, NALOXONE HYDROCHLORIDE 2 FILM: 2; .5 FILM, SOLUBLE BUCCAL; SUBLINGUAL at 17:15

## 2019-07-08 RX ADMIN — IBUPROFEN 600 MG: 600 TABLET ORAL at 22:31

## 2019-07-08 RX ADMIN — HEPARIN SODIUM 5000 UNITS: 5000 INJECTION INTRAVENOUS; SUBCUTANEOUS at 01:11

## 2019-07-08 RX ADMIN — KETOROLAC TROMETHAMINE 30 MG: 30 INJECTION, SOLUTION INTRAMUSCULAR at 17:15

## 2019-07-08 RX ADMIN — CHOLESTYRAMINE 4 G: 4 POWDER, FOR SUSPENSION ORAL at 10:56

## 2019-07-08 NOTE — PROGRESS NOTES
Progress Note - Vicky Lopezallen 1980, 41 y o  male MRN: [id number]    Unit/Bed#: E2 -01 Encounter: [id number]    Primary Care Provider: No primary care provider on file  Date and time admitted to hospital: 2019  6:57 PM        * Jaundice  Assessment & Plan  Secondary to acute hepatitis C  Appreciate GI help-plan is for liver biopsy tomorrow , EGD later this week  Continue supportive care with IV fluids, and pain control with Toradol  Transaminitis  Assessment & Plan  Patient with elevated transaminases secondary to acute liver failure  Acute liver failure was likely related to acute hepatitis C infection  Patient had MRCP  Appreciate GI consult and recommendations  Continue to monitor and trend LFTs daily  Avoid hepatotoxins  Patient is scheduled for liver biopsy tomorrow , EGD later this week  Continue supportive care with IV fluids, pain control with Toradol  History of IV drug use  Assessment & Plan  On suboxone        VTE Pharmacologic Prophylaxis:   Pharmacologic: Heparin  Mechanical VTE Prophylaxis in Place: No    Patient Centered Rounds: I have performed bedside rounds with nursing staff today  Discussions with Specialists or Other Care Team Provider:  Yes    Education and Discussions with Family / Patient:  Yes    Time Spent for Care: 20 minutes  More than 50% of total time spent on counseling and coordination of care as described above  Current Length of Stay: 7 day(s)    Current Patient Status: Inpatient   Certification Statement: The patient will continue to require additional inpatient hospital stay due to Acute liver failure requiring liver biopsy and EGD    Discharge Plan: To be determined    Code Status: Level 1 - Full Code      Subjective:   Patient complaining of generalized abdominal discomfort and is requesting more schedule doses of Toradol      Objective:     Vitals:   Temp (24hrs), Av 3 °F (36 8 °C), Min:96 7 °F (35 9 °C), Max:99 4 °F (37 4 °C)    Temp:  [96 7 °F (35 9 °C)-99 4 °F (37 4 °C)] 97 9 °F (36 6 °C)  HR:  [56-70] 64  Resp:  [16-18] 18  BP: (111-125)/(58-69) 111/58  SpO2:  [97 %-98 %] 98 %  Body mass index is 28 73 kg/m²  Input and Output Summary (last 24 hours): Intake/Output Summary (Last 24 hours) at 7/8/2019 1457  Last data filed at 7/8/2019 0835  Gross per 24 hour   Intake 1393 75 ml   Output --   Net 1393 75 ml       Physical Exam:     Physical Exam   Constitutional: He is oriented to person, place, and time  He appears well-developed and well-nourished  No distress  HENT:   Head: Normocephalic and atraumatic  Eyes: Pupils are equal, round, and reactive to light  EOM are normal  Scleral icterus is present  Neck: Normal range of motion  Neck supple  Cardiovascular: Normal rate, regular rhythm and normal heart sounds  Pulmonary/Chest: Effort normal and breath sounds normal    Abdominal: Soft  Bowel sounds are normal  He exhibits no distension  There is tenderness  Musculoskeletal: He exhibits no edema or tenderness  Neurological: He is alert and oriented to person, place, and time  Skin: Skin is warm and dry  He is not diaphoretic     Jaundiced skin         Additional Data:     Labs:    Results from last 7 days   Lab Units 07/08/19  0511   WBC Thousand/uL 10 28*   HEMOGLOBIN g/dL 13 9   HEMATOCRIT % 41 3   PLATELETS Thousands/uL 324   NEUTROS PCT % 65   LYMPHS PCT % 27   MONOS PCT % 7   EOS PCT % 0     Results from last 7 days   Lab Units 07/08/19  0511   SODIUM mmol/L 141   POTASSIUM mmol/L 3 9   CHLORIDE mmol/L 106   CO2 mmol/L 26   BUN mg/dL 11   CREATININE mg/dL 0 79   ANION GAP mmol/L 9   CALCIUM mg/dL 8 9   ALBUMIN g/dL 2 8*   TOTAL BILIRUBIN mg/dL 11 32*   ALK PHOS U/L 200*   ALT U/L 1,074*   AST U/L 867*   GLUCOSE RANDOM mg/dL 117     Results from last 7 days   Lab Units 07/04/19  0459   INR  1 05             Results from last 7 days   Lab Units 07/01/19  1925   LACTIC ACID mmol/L 0 7           * I Have Reviewed All Lab Data Listed Above  * Additional Pertinent Lab Tests Reviewed: [facility] 68 Admission Reviewed    Imaging:    Imaging Reports Reviewed Today Include:  None available  Imaging Personally Reviewed by Myself Includes:  None    Recent Cultures (last 7 days):     Results from last 7 days   Lab Units 07/01/19 1934 07/01/19 1923   BLOOD CULTURE  No Growth After 5 Days  No Growth After 5 Days  Last 24 Hours Medication List:     Current Facility-Administered Medications:  buprenorphine-naloxone 2 Film Sublingual BID Katya Pearson PA-C    cholestyramine sugar free 4 g Oral BID DARON Abraham    diphenhydrAMINE 12 5 mg Oral Q6H PRN Keo Prechtel, DO    heparin (porcine) 5,000 Units Subcutaneous Q8H [facility] Katya Pearson PA-C    hydrOXYzine HCL 25 mg Oral Q6H PRN Keo Prechtel, DO    ibuprofen 600 mg Oral Q6H PRN Keo Prechtel, DO    ketorolac 30 mg Intravenous Q6H [facility] Diane Waddell MD    metoclopramide 10 mg Intravenous Q6H PRN Adriane Schwab, PA-C    nicotine 1 patch Transdermal Daily Katya Pearson PA-C    ondansetron 4 mg Intravenous Q6H PRN Katya Pearson PA-C    sodium chloride 75 mL/hr Intravenous Continuous Katya Pearson PA-C Last Rate: 75 mL/hr (07/08/19 0835)        Today, Patient Was Seen By: Katrin Comer MD    ** Please Note: Dictation voice to text software may have been used in the creation of this document   **

## 2019-07-08 NOTE — ASSESSMENT & PLAN NOTE
Patient with elevated transaminases secondary to acute liver failure  Acute liver failure was likely related to acute hepatitis C infection  Patient had MRCP  Appreciate GI consult and recommendations  Continue to monitor and trend LFTs daily  Avoid hepatotoxins  Patient is scheduled for liver biopsy tomorrow 7/9, EGD later this week  Continue supportive care with IV fluids, pain control with Toradol

## 2019-07-08 NOTE — PROGRESS NOTES
Progress Note - Vicky Bettencourt 41 y o  male MRN: [id number]    Unit/Bed#: E2 -01 Encounter: [id number]    Subjective:     Patient seen and examined at bedside  He states he had nausea, vomiting, diarrhea, and abdominal pain after he took naltrexone yesterday  He is feeling better today, however still reports epigastric pain  No further vomiting  He had small amount of diarrhea this morning  Objective:     Vitals: Blood pressure 111/58, pulse 64, temperature 97 9 °F (36 6 °C), temperature source Tympanic, resp  rate 18, height 5' 8" (1 727 m), weight 85 7 kg (188 lb 14 9 oz), SpO2 98 %  ,Body mass index is 28 73 kg/m²  Intake/Output Summary (Last 24 hours) at 7/8/2019 1222  Last data filed at 7/8/2019 0835  Gross per 24 hour   Intake 1993 75 ml   Output --   Net 1993 75 ml       Physical Exam:     General Appearance: Alert and oriented x 3, appears stated age and cooperative  Lungs: Clear to auscultation bilaterally  Heart: Regular rate and rhythm  Abdomen: Soft, non-tender, non-distended; bowel sounds normal  Neuro: No asterixis  Extremities: No cyanosis, edema    Invasive Devices     Peripheral Intravenous Line            Peripheral IV 07/06/19 Left Antecubital 1 day                Lab Results:  Results from last 7 days   Lab Units 07/08/19  0511   WBC Thousand/uL 10 28*   HEMOGLOBIN g/dL 13 9   HEMATOCRIT % 41 3   PLATELETS Thousands/uL 324   NEUTROS PCT % 65   LYMPHS PCT % 27   MONOS PCT % 7   EOS PCT % 0     Results from last 7 days   Lab Units 07/08/19  0511   POTASSIUM mmol/L 3 9   CHLORIDE mmol/L 106   CO2 mmol/L 26   BUN mg/dL 11   CREATININE mg/dL 0 79   CALCIUM mg/dL 8 9   ALK PHOS U/L 200*   ALT U/L 1,074*   AST U/L 867*     Results from last 7 days   Lab Units 07/04/19  0459   INR  1 05     Results from last 7 days   Lab Units 07/01/19  1925   LIPASE u/L 57*       Imaging Studies: I have personally reviewed pertinent imaging studies      Mri Abdomen Wo Contrast And Mrcp    Result Date: 7/6/2019  Impression: 1  The MRCP is limited by motion artifact  The intrahepatic and extrahepatic bile duct however appear normal caliber on today's MRI  Previous common bile duct dilatation identified on ultrasound is not confirmed on this exam  No obvious filling defects are seen  2   Enlarged liver without evidence of fatty infiltration  3  Enlarged spleen 4  Contracted gallbladder with top normal thickness gallbladder wall, this is nonspecific in the setting of both ascites and incomplete distention  If there is clinical suspicion for cholecystitis: Consider HIDA scan  5  Inflammatory changes adjacent to second portion of duodenum extending into the right paracolic gutter most likely secondary to duodenitis  There is once again a focal thickening of the duodenal wall/"nodule" which was described on the previous CT exam  and is unchanged today  Consider endoscopic ultrasound for further evaluation  Workstation performed: [id number]     Us Right Upper Quadrant    Result Date: 7/2/2019  Impression: Contracted gallbladder with thickened wall  No stones  Mild dilatation of the common bile duct measuring 9 mm  No ultrasound evidence of choledocholithiasis  Normal liver echotexture  Workstation performed: [id number]     Ct Abdomen Pelvis With Contrast    Result Date: 7/1/2019  Impression: 1  Small area of nonspecific inflammatory strandy densities in the mesenteric fat adjacent to the 2nd portion of the duodenum and right lobe of the liver, indicative of inflammation perhaps secondary to duodenitis  Further clinical assessment recommended  2   1 1 cm ovoid nodule along the wall of the 2nd portion of the duodenum may represent a collapsed duodenal diverticulum  However a focal segment of duodenitis/inflammation is in the differential diagnosis as is a mural nodule  Recommend endoscopic evaluation perhaps in 2-4 weeks after the inflammatory phase of the duodenitis resolves    Further clinical assessment recommended  3   Somewhat contracted gallbladder  If symptoms are referrable to the gallbladder, consider ultrasound after a 6 hour fast  4   Normal-appearing appendix  Workstation performed: [id number]       Assessment and Plan:    1) Elevated liver enzymes: Unclear etiology, however suspect acute hepatitis C infection  Patient reports last use of IV drugs was 2 months ago  Fortunately liver enzymes appear to be slowly improving/stable: , ALT 1074, Alk Phos 200, and T Bili 11 32  MRCP was essentially unremarkable except for hepatosplenomegaly  Liver lab work-up pending including viral serologies and autoimmune serologies   INR normal  There is no evidence of encephalopathy     -Patient will hopefully be getting liver biopsy later today  -Monitor liver enzymes and INR  -If liver enzymes should worsen, would refer him to a transplant center  -Await results of liver lab work-up  -Check HIV viral load  -Would recommend repeating hepatitis-C RNA in about 6 months as there is a small chance he could clear infection on his own    2) Pruritis: Secondary to hyperbilirubinemia    -Since patient had side effects to naltrexone, continue Benadryl and hydroxyzine    3) Duodentitis:  MRCP shows inflammatory changes to the 2nd portion of the duodenum extending into the right pericolic gutter likely secondary to duodenitis    -Recommend EGD later this week

## 2019-07-08 NOTE — ASSESSMENT & PLAN NOTE
Secondary to acute hepatitis C  Appreciate GI help-plan is for liver biopsy tomorrow 7/9, EGD later this week  Continue supportive care with IV fluids, and pain control with Toradol

## 2019-07-08 NOTE — SOCIAL WORK
LOS 7 GMLOS 2 6    Cm met with Patient explained Cm role in care  Patient very drowsy  Patient stated he lives with his sister in a basement apartment he has 7 to 8 ANGEL  Patient stated he was independent Pta  He drives  Uses [facility] on [facility] [address]  No PCP  Info link information provided to the patient

## 2019-07-09 ENCOUNTER — APPOINTMENT (INPATIENT)
Dept: RADIOLOGY | Facility: HOSPITAL | Age: 39
DRG: 283 | End: 2019-07-09
Payer: COMMERCIAL

## 2019-07-09 VITALS
HEIGHT: 68 IN | TEMPERATURE: 97.3 F | WEIGHT: 188.93 LBS | DIASTOLIC BLOOD PRESSURE: 74 MMHG | BODY MASS INDEX: 28.63 KG/M2 | OXYGEN SATURATION: 98 % | HEART RATE: 41 BPM | SYSTOLIC BLOOD PRESSURE: 137 MMHG | RESPIRATION RATE: 18 BRPM

## 2019-07-09 DIAGNOSIS — R74.01 TRANSAMINITIS: Primary | ICD-10-CM

## 2019-07-09 LAB
A1AT SERPL-MCNC: 208 MG/DL (ref 90–200)
ACTIN IGG SERPL-ACNC: 7 UNITS (ref 0–19)
ALBUMIN SERPL BCP-MCNC: 2.5 G/DL (ref 3.5–5)
ALP SERPL-CCNC: 209 U/L (ref 46–116)
ALT SERPL W P-5'-P-CCNC: 704 U/L (ref 12–78)
ANION GAP SERPL CALCULATED.3IONS-SCNC: 7 MMOL/L (ref 4–13)
AST SERPL W P-5'-P-CCNC: 488 U/L (ref 5–45)
BASOPHILS # BLD AUTO: 0.04 THOUSANDS/ΜL (ref 0–0.1)
BASOPHILS NFR BLD AUTO: 1 % (ref 0–1)
BILIRUB SERPL-MCNC: 5.61 MG/DL (ref 0.2–1)
BUN SERPL-MCNC: 14 MG/DL (ref 5–25)
CALCIUM SERPL-MCNC: 8.4 MG/DL (ref 8.3–10.1)
CERULOPLASMIN SERPL-MCNC: 27.6 MG/DL (ref 16–31)
CHLORIDE SERPL-SCNC: 108 MMOL/L (ref 100–108)
CO2 SERPL-SCNC: 26 MMOL/L (ref 21–32)
CREAT SERPL-MCNC: 0.94 MG/DL (ref 0.6–1.3)
EOSINOPHIL # BLD AUTO: 0.22 THOUSAND/ΜL (ref 0–0.61)
EOSINOPHIL NFR BLD AUTO: 3 % (ref 0–6)
ERYTHROCYTE [DISTWIDTH] IN BLOOD BY AUTOMATED COUNT: 17.1 % (ref 11.6–15.1)
GFR SERPL CREATININE-BSD FRML MDRD: 104 ML/MIN/1.73SQ M
GLUCOSE SERPL-MCNC: 114 MG/DL (ref 65–140)
HCT VFR BLD AUTO: 36.8 % (ref 36.5–49.3)
HGB BLD-MCNC: 12.1 G/DL (ref 12–17)
IMM GRANULOCYTES # BLD AUTO: 0.04 THOUSAND/UL (ref 0–0.2)
IMM GRANULOCYTES NFR BLD AUTO: 1 % (ref 0–2)
INR PPP: 1 (ref 0.84–1.19)
LYMPHOCYTES # BLD AUTO: 2.77 THOUSANDS/ΜL (ref 0.6–4.47)
LYMPHOCYTES NFR BLD AUTO: 36 % (ref 14–44)
MAGNESIUM SERPL-MCNC: 2.1 MG/DL (ref 1.6–2.6)
MCH RBC QN AUTO: 28.6 PG (ref 26.8–34.3)
MCHC RBC AUTO-ENTMCNC: 32.9 G/DL (ref 31.4–37.4)
MCV RBC AUTO: 87 FL (ref 82–98)
MITOCHONDRIA M2 IGG SER-ACNC: <20 UNITS (ref 0–20)
MONOCYTES # BLD AUTO: 0.68 THOUSAND/ΜL (ref 0.17–1.22)
MONOCYTES NFR BLD AUTO: 9 % (ref 4–12)
NEUTROPHILS # BLD AUTO: 3.97 THOUSANDS/ΜL (ref 1.85–7.62)
NEUTS SEG NFR BLD AUTO: 50 % (ref 43–75)
NRBC BLD AUTO-RTO: 0 /100 WBCS
PLATELET # BLD AUTO: 246 THOUSANDS/UL (ref 149–390)
PMV BLD AUTO: 12.1 FL (ref 8.9–12.7)
POTASSIUM SERPL-SCNC: 3.8 MMOL/L (ref 3.5–5.3)
PROT SERPL-MCNC: 6 G/DL (ref 6.4–8.2)
PROTHROMBIN TIME: 13.3 SECONDS (ref 11.6–14.5)
RBC # BLD AUTO: 4.23 MILLION/UL (ref 3.88–5.62)
SODIUM SERPL-SCNC: 141 MMOL/L (ref 136–145)
WBC # BLD AUTO: 7.72 THOUSAND/UL (ref 4.31–10.16)

## 2019-07-09 PROCEDURE — 80053 COMPREHEN METABOLIC PANEL: CPT | Performed by: FAMILY MEDICINE

## 2019-07-09 PROCEDURE — 99152 MOD SED SAME PHYS/QHP 5/>YRS: CPT | Performed by: RADIOLOGY

## 2019-07-09 PROCEDURE — 88313 SPECIAL STAINS GROUP 2: CPT | Performed by: PATHOLOGY

## 2019-07-09 PROCEDURE — 0FB23ZX EXCISION OF LEFT LOBE LIVER, PERCUTANEOUS APPROACH, DIAGNOSTIC: ICD-10-PCS | Performed by: HOSPITALIST

## 2019-07-09 PROCEDURE — 88307 TISSUE EXAM BY PATHOLOGIST: CPT | Performed by: PATHOLOGY

## 2019-07-09 PROCEDURE — 47000 NEEDLE BIOPSY OF LIVER PERQ: CPT | Performed by: RADIOLOGY

## 2019-07-09 PROCEDURE — 76942 ECHO GUIDE FOR BIOPSY: CPT | Performed by: RADIOLOGY

## 2019-07-09 PROCEDURE — 87536 HIV-1 QUANT&REVRSE TRNSCRPJ: CPT | Performed by: PHYSICIAN ASSISTANT

## 2019-07-09 PROCEDURE — RECHECK: Performed by: FAMILY MEDICINE

## 2019-07-09 PROCEDURE — 85025 COMPLETE CBC W/AUTO DIFF WBC: CPT | Performed by: FAMILY MEDICINE

## 2019-07-09 PROCEDURE — 99152 MOD SED SAME PHYS/QHP 5/>YRS: CPT

## 2019-07-09 PROCEDURE — 99238 HOSP IP/OBS DSCHRG MGMT 30/<: CPT | Performed by: FAMILY MEDICINE

## 2019-07-09 PROCEDURE — 85610 PROTHROMBIN TIME: CPT | Performed by: PHYSICIAN ASSISTANT

## 2019-07-09 PROCEDURE — 83735 ASSAY OF MAGNESIUM: CPT | Performed by: FAMILY MEDICINE

## 2019-07-09 PROCEDURE — 76937 US GUIDE VASCULAR ACCESS: CPT

## 2019-07-09 PROCEDURE — 47000 NEEDLE BIOPSY OF LIVER PERQ: CPT

## 2019-07-09 PROCEDURE — 99232 SBSQ HOSP IP/OBS MODERATE 35: CPT | Performed by: PHYSICIAN ASSISTANT

## 2019-07-09 RX ORDER — ONDANSETRON 4 MG/1
4 TABLET, ORALLY DISINTEGRATING ORAL EVERY 6 HOURS PRN
Qty: 20 TABLET | Refills: 0 | Status: SHIPPED | OUTPATIENT
Start: 2019-07-09 | End: 2019-10-24

## 2019-07-09 RX ORDER — IBUPROFEN 600 MG/1
600 TABLET ORAL EVERY 6 HOURS PRN
Qty: 30 TABLET | Refills: 0 | Status: SHIPPED | OUTPATIENT
Start: 2019-07-09 | End: 2021-07-16

## 2019-07-09 RX ORDER — CHOLESTYRAMINE LIGHT 4 G/5.7G
4 POWDER, FOR SUSPENSION ORAL 2 TIMES DAILY
Qty: 60 PACKET | Refills: 1 | Status: SHIPPED | OUTPATIENT
Start: 2019-07-09 | End: 2019-10-24

## 2019-07-09 RX ORDER — HYDROXYZINE HYDROCHLORIDE 25 MG/1
25 TABLET, FILM COATED ORAL EVERY 6 HOURS PRN
Qty: 30 TABLET | Refills: 0 | Status: SHIPPED | OUTPATIENT
Start: 2019-07-09 | End: 2019-10-24

## 2019-07-09 RX ORDER — FENTANYL CITRATE 50 UG/ML
INJECTION, SOLUTION INTRAMUSCULAR; INTRAVENOUS CODE/TRAUMA/SEDATION MEDICATION
Status: COMPLETED | OUTPATIENT
Start: 2019-07-09 | End: 2019-07-09

## 2019-07-09 RX ORDER — ONDANSETRON 4 MG/1
4 TABLET, ORALLY DISINTEGRATING ORAL EVERY 6 HOURS PRN
Status: DISCONTINUED | OUTPATIENT
Start: 2019-07-09 | End: 2019-07-09 | Stop reason: HOSPADM

## 2019-07-09 RX ORDER — DIPHENHYDRAMINE HCL 25 MG
12.5 TABLET ORAL EVERY 6 HOURS PRN
Qty: 30 TABLET | Refills: 0 | Status: SHIPPED | OUTPATIENT
Start: 2019-07-09 | End: 2021-07-16

## 2019-07-09 RX ORDER — IBUPROFEN 600 MG/1
600 TABLET ORAL EVERY 6 HOURS PRN
Status: DISCONTINUED | OUTPATIENT
Start: 2019-07-09 | End: 2019-07-09 | Stop reason: HOSPADM

## 2019-07-09 RX ORDER — MIDAZOLAM HYDROCHLORIDE 1 MG/ML
INJECTION INTRAMUSCULAR; INTRAVENOUS CODE/TRAUMA/SEDATION MEDICATION
Status: COMPLETED | OUTPATIENT
Start: 2019-07-09 | End: 2019-07-09

## 2019-07-09 RX ADMIN — FENTANYL CITRATE 50 MCG: 50 INJECTION, SOLUTION INTRAMUSCULAR; INTRAVENOUS at 08:42

## 2019-07-09 RX ADMIN — FENTANYL CITRATE 50 MCG: 50 INJECTION, SOLUTION INTRAMUSCULAR; INTRAVENOUS at 08:39

## 2019-07-09 RX ADMIN — CHOLESTYRAMINE 4 G: 4 POWDER, FOR SUSPENSION ORAL at 10:00

## 2019-07-09 RX ADMIN — MIDAZOLAM 1 MG: 1 INJECTION INTRAMUSCULAR; INTRAVENOUS at 08:47

## 2019-07-09 RX ADMIN — BUPRENORPHINE HYDROCHLORIDE, NALOXONE HYDROCHLORIDE 2 FILM: 2; .5 FILM, SOLUBLE BUCCAL; SUBLINGUAL at 09:38

## 2019-07-09 RX ADMIN — KETOROLAC TROMETHAMINE 30 MG: 30 INJECTION, SOLUTION INTRAMUSCULAR at 11:24

## 2019-07-09 RX ADMIN — KETOROLAC TROMETHAMINE 30 MG: 30 INJECTION, SOLUTION INTRAMUSCULAR at 05:23

## 2019-07-09 RX ADMIN — MIDAZOLAM 1 MG: 1 INJECTION INTRAMUSCULAR; INTRAVENOUS at 08:42

## 2019-07-09 RX ADMIN — FENTANYL CITRATE 50 MCG: 50 INJECTION, SOLUTION INTRAMUSCULAR; INTRAVENOUS at 08:37

## 2019-07-09 RX ADMIN — KETOROLAC TROMETHAMINE 30 MG: 30 INJECTION, SOLUTION INTRAMUSCULAR at 00:31

## 2019-07-09 RX ADMIN — SODIUM CHLORIDE 75 ML/HR: 0.9 INJECTION, SOLUTION INTRAVENOUS at 12:42

## 2019-07-09 RX ADMIN — MIDAZOLAM 2 MG: 1 INJECTION INTRAMUSCULAR; INTRAVENOUS at 08:37

## 2019-07-09 RX ADMIN — FENTANYL CITRATE 50 MCG: 50 INJECTION, SOLUTION INTRAMUSCULAR; INTRAVENOUS at 08:47

## 2019-07-09 NOTE — ASSESSMENT & PLAN NOTE
Secondary to acute hepatitis C  Patient had liver biopsy performed today by Interventional Radiology 7/9  Had discussion with GI and they feel patient is okay for discharge to home today as his LFTs are improving significantly  GI will arrange for patient to have CMP repeated in 1 week  They will also see the patient in clinic for repeat EGD in 1-2 weeks  Continue regular diet, hydration with fluids, pain control, antiemetics  Patient is currently hemodynamically stable for discharge to home today

## 2019-07-09 NOTE — ASSESSMENT & PLAN NOTE
Secondary to acute hepatitis C  Patient had liver biopsy performed today by Interventional Radiology 7/9  Will have EGD performed later this week by GI due to duodenitis seen on MRCP earlier in his admission  Continue supportive care with IV fluids, and pain control with Toradol

## 2019-07-09 NOTE — PROGRESS NOTES
Progress Note - Vicky Bettencourt 1980, 41 y o  male MRN: [id number]    Unit/Bed#: E2 -01 Encounter: [id number]    Primary Care Provider: No primary care provider on file  Date and time admitted to hospital: 7/1/2019  6:57 PM        * Jaundice  Assessment & Plan  Secondary to acute hepatitis C  Patient had liver biopsy performed today by Interventional Radiology 7/9  Will have EGD performed later this week by GI due to duodenitis seen on MRCP earlier in his admission  Continue supportive care with IV fluids, and pain control with Toradol  Transaminitis  Assessment & Plan  Patient with elevated transaminases secondary to acute liver failure  Acute liver failure was likely related to acute hepatitis C infection  Patient had MRCP-which showed hepatosplenomegaly evidence of duodenitis  Appreciate GI consult and recommendations-patient will have EGD later this week to evaluate duodenitis  AST, ALT, and total bilirubin levels continuing to improve and trend downwards  Current , , total bilirubin 5 61  Liver lab workup for viral and autoimmune serologies pending, HIV viral load pending  As per GI, if patient's liver function tests worsen he should be transferred to a transplant center  Will check repeat hepatitis-C RNA in 6 months  Avoid hepatotoxins  Naltrexone for pruritus discontinued due to untoward side effects  Continue Benadryl and hydroxyzine  Continue supportive care with IV fluids, pain control with Toradol  History of IV drug use  Assessment & Plan  On suboxone  VTE Pharmacologic Prophylaxis:   Pharmacologic: Heparin  Mechanical VTE Prophylaxis in Place: No    Patient Centered Rounds: I have performed bedside rounds with nursing staff today  Discussions with Specialists or Other Care Team Provider:  Yes    Education and Discussions with Family / Patient:  Yes    Time Spent for Care: 20 minutes    More than 50% of total time spent on counseling and coordination of care as described above  Current Length of Stay: 8 day(s)    Current Patient Status: Inpatient   Certification Statement: The patient will continue to require additional inpatient hospital stay due to Acute liver failure    Discharge Plan: To be determined    Code Status: Level 1 - Full Code      Subjective:   Patient has no complaints at this time  Objective:     Vitals:   Temp (24hrs), Av 7 °F (36 5 °C), Min:97 5 °F (36 4 °C), Max:97 9 °F (36 6 °C)    Temp:  [97 5 °F (36 4 °C)-97 9 °F (36 6 °C)] 97 5 °F (36 4 °C)  HR:  [42-58] 50  Resp:  [18] 18  BP: (106-128)/(55-73) 118/73  SpO2:  [97 %-99 %] 98 %  Body mass index is 28 73 kg/m²  Input and Output Summary (last 24 hours): Intake/Output Summary (Last 24 hours) at 2019 1431  Last data filed at 2019 1242  Gross per 24 hour   Intake 2108 75 ml   Output --   Net 2108 75 ml       Physical Exam:     Physical Exam   Constitutional: He is oriented to person, place, and time  He appears well-developed and well-nourished  No distress  HENT:   Head: Normocephalic and atraumatic  Eyes: Scleral icterus is present  Neck: Normal range of motion  Neck supple  Cardiovascular: Normal rate, regular rhythm and normal heart sounds  Pulmonary/Chest: Effort normal and breath sounds normal    Abdominal: Soft  Bowel sounds are normal  He exhibits distension  There is tenderness  There is guarding  There is no rebound  Musculoskeletal: He exhibits no edema or tenderness  Neurological: He is alert and oriented to person, place, and time  Skin: Skin is warm and dry  He is not diaphoretic     Jaundiced skin     Additional Data:     Labs:    Results from last 7 days   Lab Units 19  0508   WBC Thousand/uL 7 72   HEMOGLOBIN g/dL 12 1   HEMATOCRIT % 36 8   PLATELETS Thousands/uL 246   NEUTROS PCT % 50   LYMPHS PCT % 36   MONOS PCT % 9   EOS PCT % 3     Results from last 7 days   Lab Units 19  0936   SODIUM mmol/L 141 POTASSIUM mmol/L 3 8   CHLORIDE mmol/L 108   CO2 mmol/L 26   BUN mg/dL 14   CREATININE mg/dL 0 94   ANION GAP mmol/L 7   CALCIUM mg/dL 8 4   ALBUMIN g/dL 2 5*   TOTAL BILIRUBIN mg/dL 5 61*   ALK PHOS U/L 209*   ALT U/L 704*   AST U/L 488*   GLUCOSE RANDOM mg/dL 114     Results from last 7 days   Lab Units 07/09/19  0508   INR  1 00                       * I Have Reviewed All Lab Data Listed Above  * Additional Pertinent Lab Tests Reviewed: [facility] Admission Reviewed    Imaging:    Imaging Reports Reviewed Today Include:  None available  Imaging Personally Reviewed by Myself Includes:  None    Recent Cultures (last 7 days):           Last 24 Hours Medication List:     Current Facility-Administered Medications:  buprenorphine-naloxone 2 Film Sublingual BID Katya Pearson PA-C    cholestyramine sugar free 4 g Oral BID Julianne Diver, CRNP    diphenhydrAMINE 12 5 mg Oral Q6H PRN Keo Prechtel, DO    heparin (porcine) 5,000 Units Subcutaneous Q8H [facility] Katya Pearson PA-C    hydrOXYzine HCL 25 mg Oral Q6H PRN Keo Prechtel, DO    ibuprofen 600 mg Oral Q6H PRN Keo Prechtel, DO    ketorolac 30 mg Intravenous Q6H [facility] Diane Waddell MD    metoclopramide 10 mg Intravenous Q6H PRN Caryl English PA-C    nicotine 1 patch Transdermal Daily Katya Pearson PA-C    ondansetron 4 mg Intravenous Q6H PRN Katya Pearson PA-C    sodium chloride 75 mL/hr Intravenous Continuous Katya Pearson PA-C Last Rate: 75 mL/hr (07/09/19 1242)        Today, Patient Was Seen By: Kulwinder Martinez MD    ** Please Note: Dictation voice to text software may have been used in the creation of this document   **

## 2019-07-09 NOTE — DISCHARGE SUMMARY
Discharge- Vicky Lopezallen 1980, 41 y o  male MRN: [id number]    Unit/Bed#: E2 -01 Encounter: [id number]    Primary Care Provider: No primary care provider on file  Date and time admitted to hospital: 7/1/2019  6:57 PM        * Jaundice  Assessment & Plan  Secondary to acute hepatitis C  Patient had liver biopsy performed today by Interventional Radiology 7/9  Had discussion with GI and they feel patient is okay for discharge to home today as his LFTs are improving significantly  GI will arrange for patient to have CMP repeated in 1 week  They will also see the patient in clinic for repeat EGD in 1-2 weeks  Continue regular diet, hydration with fluids, pain control, antiemetics  Patient is currently hemodynamically stable for discharge to home today  Transaminitis  Assessment & Plan  Patient with elevated transaminases secondary to acute liver failure  Acute liver failure was likely related to acute hepatitis C infection  Patient had MRCP-which showed hepatosplenomegaly evidence of duodenitis  Appreciate GI consult and recommendations-patient will have EGD later this week to evaluate duodenitis  AST, ALT, and total bilirubin levels continuing to improve and trend downwards  Current , , total bilirubin 5 61  Liver lab workup for viral and autoimmune serologies pending, HIV viral load pending  As per GI, if patient's liver function tests worsen he should be transferred to a transplant center  Will check repeat hepatitis-C RNA in 6 months  Avoid hepatotoxins  Naltrexone for pruritus discontinued due to untoward side effects  Continue Benadryl and hydroxyzine  Continue supportive care  After discussion with GI, since patient's LFTs improving significantly, he is stable from a GI standpoint for discharge to home today  Will require repeat CMP in 1 week as well as EGD in 1-2 weeks  History of IV drug use  Assessment & Plan  On suboxone            Discharging Physician / Practitioner: Be Galvan MD  PCP: No primary care provider on file  Admission Date:   Admission Orders (From admission, onward)    Ordered        07/01/19 2118  Inpatient Admission (expected length of stay for this patient Order details is greater than two midnights)  Once             Discharge Date: 07/09/19    Resolved Problems  Date Reviewed: 7/9/2019    None          Consultations During Hospital Stay:  · Gastroenterology    Procedures Performed:   · CT abdomen and pelvis with contrast 07/01/2019, right upper quadrant ultrasound 07/02/2019, MRI abdomen without contrast and MRCP 07/06/2019, IR imaging guided biopsy/aspiration of liver 07/09/2019  Significant Findings / Test Results:   CT abdomen and pelvis with contrast 7/1-small area of nonspecific inflammatory stranding densities in the mesenteric fat adjacent to the 2nd portion of the duodenum and  right lobe of the liver, indicative of inflammation prior secondary to duodenitis  Further clinical assessment recommended  1 1 cm ovoid nodule along the wall of the 2nd portion of the duodenum may represent a collapsed duodenal diverticulum  However a focal segment of duodenitis/inflammation is in the differential diagnosis as is a mural nodule  Recommend endoscopic evaluation perhaps in 2-4 weeks after the inflammatory phase of the duodenitis resolves  Further clinical  assessment recommended  Somewhat contracted gallbladder  If symptoms are referable to the gallbladder, consider ultrasound after his 6 hour fast   Normal-appearing appendix  Right upper quadrant ultrasound 7/2-contracted gallbladder with thickened wall  No stones  Mild dilatation of the common bile duct measuring 9 mm  No ultrasound evidence of choledocholithiasis  Normal liver echotexture  MRI abdomen without contrast and MRCP 7/6-the MRCP is limited by motion artifact  The intrahepatic and extrahepatic bile duct however appear normal caliber on today's MRI  Previous common bile duct dilatation identified on ultrasound is not confirmed on this exam   No obvious filling defects are seen  Enlarged liver without evidence of fatty infiltration  Enlarged spleen  Contracted gallbladder with top normal thickness gallbladder wall, this is nonspecific in the setting of both ascites and incomplete distention  If there is clinical suspicion for cholecystitis, consider HIDA scan  Inflammatory changes adjacent to 2nd portion of duodenum extending into the right paracolic gutter most likely secondary to duodenitis  There is once again a focal thickening of the duodenal wall/nodule which was described on the previous CT exam and is unchanged  Consider endoscopic ultrasound for further evaluation  IR image guided biopsy/aspiration of liver 7/9-successful image guided biopsy/aspiration of liver performed by IR today  Official report pending  Incidental Findings:   · None     Test Results Pending at Discharge (will require follow up): · Liver viral and autoimmune serologies  Outpatient Tests Requested:  · CMP in 1 week  · EGD in 2-4 weeks  Complications:  None    Reason for Admission:  Jaundice and elevated transaminases due to acute liver failure  Hospital Course:     Trista Sutton is a 41 y o  male patient who originally presented to the hospital on 7/1/2019 due to jaundice, anorexia, weight loss  He had also been having chronic epigastric and right upper quadrant pain  He has a history of IV drug use with heroin and intermittent cocaine use  During his evaluation and workup in the ED at [facility] he was noted to have severely elevated liver transaminases as well as total bilirubin along with jaundice  He was admitted to the medical-surgical unit and started on supportive care  GI was consulted  CT of the abdomen and pelvis without contrast showed duodenitis with a contracted gallbladder    Follow up with right upper quadrant ultrasound show normal liver echotexture, contracted gallbladder with thickened wall and no evidence of cholecystitis or choledocholithiasis  GI was evaluating patient and performed MRCP which revealed evidence of duodenitis  There was no intrahepatic or extrahepatic biliary ductal dilatation  Patient tolerated the procedure well  During his hospitalization serologies for viral and autoimmune causes of elevated liver transaminases were obtained and which are currently pending  HIV viral load was negative  Patient's LFTs improved during his hospitalization with supportive care  He eventually had a liver biopsy performed by interventional radiology  He is tolerating a regular diet  Since his LFTs are markedly improved, GI recommended discharging the patient to home and having scheduled for CMP in 1 week, and then and EGD in the next 2-4 weeks  The patient is currently hemodynamically stable for discharge to home today  Please see above list of diagnoses and related plan for additional information  Condition at Discharge: stable     Discharge Day Visit / Exam:     * Please refer to separate progress note for these details *    Discussion with Family:  no    Discharge instructions/Information to patient and family:   See after visit summary for information provided to patient and family  Provisions for Follow-Up Care:  See after visit summary for information related to follow-up care and any pertinent home health orders  Disposition:     Home    For Discharges to [facility] SNF:   · Not Applicable to this Patient - Not Applicable to this Patient    Planned Readmission:  None     Discharge Statement:  I spent 20 minutes discharging the patient  This time was spent on the day of discharge  I had direct contact with the patient on the day of discharge   Greater than 50% of the total time was spent examining patient, answering all patient questions, arranging and discussing plan of care with patient as well as directly providing post-discharge instructions  Additional time then spent on discharge activities  Discharge Medications:  See after visit summary for reconciled discharge medications provided to patient and family        ** Please Note: This note has been constructed using a voice recognition system **

## 2019-07-09 NOTE — INTERVAL H&P NOTE
Update:     42 yo with market elevations of his liver enzymes and bilirubin, he is visibly jaundice  Biopsy for diagnosis  Unusually he is actually tender at the site of his right upper quadrant  Discussed the risks with the patient, which include bleeding  Anticoagulation has been held  He is NPO  We will sedate  /55 (BP Location: Right arm)   Pulse (!) 47   Temp 97 5 °F (36 4 °C) (Temporal)   Resp 18   Ht 5' 8" (1 727 m)   Wt 85 7 kg (188 lb 14 9 oz)   SpO2 97%   BMI 28 73 kg/m²           Patient re-evaluated   Accept as history and physical     Kush Rowell MD/July 9, 2019/8:28 AM

## 2019-07-09 NOTE — PROGRESS NOTES
Progress Note - Vicky Bettencourt 41 y o  male MRN: [id number]    Unit/Bed#: E2 -01 Encounter: [id number]    Subjective:     Patient seen and examined at bedside  He reports feeling well today  He has some pain at the liver biopsy site  His pruritis is significantly improved  He denies nausea and vomiting  Objective:     Vitals: Blood pressure 118/73, pulse (!) 50, temperature 97 5 °F (36 4 °C), temperature source Temporal, resp  rate 18, height 5' 8" (1 727 m), weight 85 7 kg (188 lb 14 9 oz), SpO2 98 %  ,Body mass index is 28 73 kg/m²  Intake/Output Summary (Last 24 hours) at 7/9/2019 1534  Last data filed at 7/9/2019 1242  Gross per 24 hour   Intake 2108 75 ml   Output --   Net 2108 75 ml       Physical Exam:     General Appearance: Alert and oriented x 3, appears stated age and cooperative  Lungs: Clear to auscultation bilaterally  Heart: Regular rate and rhythm  Abdomen: Soft, non-tender, non-distended; bowel sounds normal  Extremities: No cyanosis, edema    Invasive Devices     Peripheral Intravenous Line            Peripheral IV 07/06/19 Left Antecubital 2 days                Lab Results:  Results from last 7 days   Lab Units 07/09/19  0508   WBC Thousand/uL 7 72   HEMOGLOBIN g/dL 12 1   HEMATOCRIT % 36 8   PLATELETS Thousands/uL 246   NEUTROS PCT % 50   LYMPHS PCT % 36   MONOS PCT % 9   EOS PCT % 3     Results from last 7 days   Lab Units 07/09/19  0936   POTASSIUM mmol/L 3 8   CHLORIDE mmol/L 108   CO2 mmol/L 26   BUN mg/dL 14   CREATININE mg/dL 0 94   CALCIUM mg/dL 8 4   ALK PHOS U/L 209*   ALT U/L 704*   AST U/L 488*     Results from last 7 days   Lab Units 07/09/19  0508   INR  1 00           Imaging Studies: I have personally reviewed pertinent imaging studies  Mri Abdomen Wo Contrast And Mrcp    Result Date: 7/6/2019  Impression: 1  The MRCP is limited by motion artifact  The intrahepatic and extrahepatic bile duct however appear normal caliber on today's MRI   Previous common bile duct dilatation identified on ultrasound is not confirmed on this exam  No obvious filling defects are seen  2   Enlarged liver without evidence of fatty infiltration  3  Enlarged spleen 4  Contracted gallbladder with top normal thickness gallbladder wall, this is nonspecific in the setting of both ascites and incomplete distention  If there is clinical suspicion for cholecystitis: Consider HIDA scan  5  Inflammatory changes adjacent to second portion of duodenum extending into the right paracolic gutter most likely secondary to duodenitis  There is once again a focal thickening of the duodenal wall/"nodule" which was described on the previous CT exam  and is unchanged today  Consider endoscopic ultrasound for further evaluation  Workstation performed: [id number]     Us Right Upper Quadrant    Result Date: 7/2/2019  Impression: Contracted gallbladder with thickened wall  No stones  Mild dilatation of the common bile duct measuring 9 mm  No ultrasound evidence of choledocholithiasis  Normal liver echotexture  Workstation performed: [id number]     Ct Abdomen Pelvis With Contrast    Result Date: 7/1/2019  Impression: 1  Small area of nonspecific inflammatory strandy densities in the mesenteric fat adjacent to the 2nd portion of the duodenum and right lobe of the liver, indicative of inflammation perhaps secondary to duodenitis  Further clinical assessment recommended  2   1 1 cm ovoid nodule along the wall of the 2nd portion of the duodenum may represent a collapsed duodenal diverticulum  However a focal segment of duodenitis/inflammation is in the differential diagnosis as is a mural nodule  Recommend endoscopic evaluation perhaps in 2-4 weeks after the inflammatory phase of the duodenitis resolves  Further clinical assessment recommended  3   Somewhat contracted gallbladder  If symptoms are referrable to the gallbladder, consider ultrasound after a 6 hour fast  4   Normal-appearing appendix   Workstation performed: [id number]       Assessment and Plan:    Discussed with Dr Rasheeda Irwin with [facility]    1) Elevated liver enzymes: Unclear etiology, but one possibility would be acute hepatitis C infection  Patient reports last use of IV drugs was 2 months ago  Fortunately liver enzymes are significantly improved today: , , Alk Phos 209, and T Bili 5 61  MRCP was essentially unremarkable except for hepatosplenomegaly  Liver lab work-up pending including viral serologies and autoimmune serologies  Liver biopsy done today, results pending   INR normal  There is no evidence of encephalopathy      -Patient OK for discharge from GI standpoint  -Recommend repeat liver enzymes in week   -Follow-up results of liver biopsy  -Would recommend repeating hepatitis-C RNA in about 6 months as there is a small chance he could clear infection on his own  -If hepatitis C RNA remains positive after 6 months, he would need treatment      2) Pruritis: Secondary to hyperbilirubinemia     -Fortunately, this is resolved     3) Duodentitis:  MRCP shows inflammatory changes to the 2nd portion of the duodenum extending into the right pericolic gutter likely secondary to duodenitis     -Recommend elective EGD/EUS in 1-2 weeks, our office will call patient to schedule procedure

## 2019-07-09 NOTE — ASSESSMENT & PLAN NOTE
Patient with elevated transaminases secondary to acute liver failure  Acute liver failure was likely related to acute hepatitis C infection  Patient had MRCP-which showed hepatosplenomegaly evidence of duodenitis  Appreciate GI consult and recommendations-patient will have EGD later this week to evaluate duodenitis  AST, ALT, and total bilirubin levels continuing to improve and trend downwards  Current , , total bilirubin 5 61  Liver lab workup for viral and autoimmune serologies pending, HIV viral load pending  As per GI, if patient's liver function tests worsen he should be transferred to a transplant center  Will check repeat hepatitis-C RNA in 6 months  Avoid hepatotoxins  Naltrexone for pruritus discontinued due to untoward side effects  Continue Benadryl and hydroxyzine  Continue supportive care with IV fluids, pain control with Toradol

## 2019-07-09 NOTE — BRIEF OP NOTE (RAD/CATH)
IR LIVER BIOPSY    PATIENT NAME: Adriana Villeda  : 1980  MRN: [id number]     Pre-op Diagnosis:   1  Jaundice    2  Nausea vomiting and diarrhea    3  RUQ pain    4  Transaminitis    5  Icterus    6  Duodenitis    7  History of IV drug use      Post-op Diagnosis:   1  Jaundice    2  Nausea vomiting and diarrhea    3  RUQ pain    4  Transaminitis    5  Icterus    6  Duodenitis    7   History of IV drug use        Surgeon:   Teena Harrell MD  Assistants:     No qualified resident was available, Resident is only observing    Estimated Blood Loss: minimal  Findings: 3 left liver cores    D stat in tract for hemostasis    Specimens: 3 18g cores in formalin    Complications:  None immediate    Anesthesia: Conscious sedation    Teena Harrell MD     Date: 2019  Time: 8:59 AM

## 2019-07-09 NOTE — ASSESSMENT & PLAN NOTE
Patient with elevated transaminases secondary to acute liver failure  Acute liver failure was likely related to acute hepatitis C infection  Patient had MRCP-which showed hepatosplenomegaly evidence of duodenitis  Appreciate GI consult and recommendations-patient will have EGD later this week to evaluate duodenitis  AST, ALT, and total bilirubin levels continuing to improve and trend downwards  Current , , total bilirubin 5 61  Liver lab workup for viral and autoimmune serologies pending, HIV viral load pending  As per GI, if patient's liver function tests worsen he should be transferred to a transplant center  Will check repeat hepatitis-C RNA in 6 months  Avoid hepatotoxins  Naltrexone for pruritus discontinued due to untoward side effects  Continue Benadryl and hydroxyzine  Continue supportive care  After discussion with GI, since patient's LFTs improving significantly, he is stable from a GI standpoint for discharge to home today  Will require repeat CMP in 1 week as well as EGD in 1-2 weeks

## 2019-07-11 ENCOUNTER — TELEPHONE (OUTPATIENT)
Dept: OTHER | Facility: HOSPITAL | Age: 39
End: 2019-07-11

## 2019-07-11 LAB
HIV1 RNA # SERPL NAA+PROBE: <20 COPIES/ML
HIV1 RNA SERPL NAA+PROBE-LOG#: NORMAL LOG10COPY/ML

## 2019-07-15 ENCOUNTER — TELEPHONE (OUTPATIENT)
Dept: GASTROENTEROLOGY | Facility: AMBULARY SURGERY CENTER | Age: 39
End: 2019-07-15

## 2019-07-15 NOTE — TELEPHONE ENCOUNTER
[phone]      [place] from [facility] department called to confirm results / diagnosis from recent labs   Pt has follow up visit 9-12-19 with Omid Castellano

## 2019-07-15 NOTE — TELEPHONE ENCOUNTER
Returned call to [employer]  She is aware HIV testing was completed due to jaundice/symptoms from 7/1/19 ER visit

## 2019-08-05 ENCOUNTER — APPOINTMENT (OUTPATIENT)
Dept: LAB | Facility: HOSPITAL | Age: 39
End: 2019-08-05
Payer: COMMERCIAL

## 2019-08-05 DIAGNOSIS — R79.89 ELEVATED LFTS: Primary | ICD-10-CM

## 2019-08-05 DIAGNOSIS — R74.01 TRANSAMINITIS: ICD-10-CM

## 2019-08-05 LAB
ALBUMIN SERPL BCP-MCNC: 3.9 G/DL (ref 3.5–5)
ALP SERPL-CCNC: 170 U/L (ref 46–116)
ALT SERPL W P-5'-P-CCNC: 116 U/L (ref 12–78)
AST SERPL W P-5'-P-CCNC: 45 U/L (ref 5–45)
BILIRUB DIRECT SERPL-MCNC: 0.65 MG/DL (ref 0–0.2)
BILIRUB SERPL-MCNC: 1.24 MG/DL (ref 0.2–1)
PROT SERPL-MCNC: 7.7 G/DL (ref 6.4–8.2)

## 2019-08-05 PROCEDURE — 36415 COLL VENOUS BLD VENIPUNCTURE: CPT

## 2019-08-05 PROCEDURE — 80076 HEPATIC FUNCTION PANEL: CPT

## 2019-08-07 ENCOUNTER — TELEPHONE (OUTPATIENT)
Dept: GASTROENTEROLOGY | Facility: CLINIC | Age: 39
End: 2019-08-07

## 2019-08-07 NOTE — TELEPHONE ENCOUNTER
----- Message from Gina Boyce PA-C sent at 7/9/2019  4:09 PM EDT -----  Alejandrina Bustillo,    Please schedule EGD/EUS in the next 2-3 weeks  I entered the orders      Thank you,  Cortland Schwab

## 2019-08-15 ENCOUNTER — HOSPITAL ENCOUNTER (OUTPATIENT)
Dept: GASTROENTEROLOGY | Facility: HOSPITAL | Age: 39
Setting detail: OUTPATIENT SURGERY
Discharge: HOME | End: 2019-08-15

## 2019-09-12 ENCOUNTER — OFFICE VISIT (OUTPATIENT)
Dept: GASTROENTEROLOGY | Facility: MEDICAL CENTER | Age: 39
End: 2019-09-12
Payer: COMMERCIAL

## 2019-09-12 VITALS
BODY MASS INDEX: 28.79 KG/M2 | TEMPERATURE: 97.8 F | SYSTOLIC BLOOD PRESSURE: 120 MMHG | DIASTOLIC BLOOD PRESSURE: 60 MMHG | HEART RATE: 68 BPM | WEIGHT: 190 LBS | HEIGHT: 68 IN

## 2019-09-12 DIAGNOSIS — B18.2 CHRONIC HEPATITIS C WITHOUT HEPATIC COMA (HCC): ICD-10-CM

## 2019-09-12 DIAGNOSIS — K31.89 DUODENAL NODULE: ICD-10-CM

## 2019-09-12 DIAGNOSIS — K29.80 DUODENITIS: ICD-10-CM

## 2019-09-12 DIAGNOSIS — R79.89 ELEVATED LFTS: ICD-10-CM

## 2019-09-12 DIAGNOSIS — R93.3 ABNORMAL CT SCAN, SMALL BOWEL: ICD-10-CM

## 2019-09-12 DIAGNOSIS — F19.90 IV DRUG USER: Primary | ICD-10-CM

## 2019-09-12 PROCEDURE — 99214 OFFICE O/P EST MOD 30 MIN: CPT | Performed by: PHYSICIAN ASSISTANT

## 2019-09-12 NOTE — PROGRESS NOTES
[facility] - Outpatient Follow-up Note  Vicky Bettencourt 41 y o  male MRN: [id number]  Encounter: [id number]          ASSESSMENT AND PLAN:    1  Hepatitis-C positive  2  History of IV drug use  3  Elevated LFTs     -Ultrasound showed mild hepatomegaly and CBD dilation to 9 mm but was otherwise normal, no evidence of choledocholithiasis was seen               -GS showed possible duodenitis as well as a 1 1 cm ovoid nodule along the wall of the 2nd portion of the duodenum               -LFTs have significantly improved, total bilirubin is 1 4 alk-phos 170 AST 45 and                 -liver biopsy showed acute hepatitis consistent with hepatitis-C               -Workup for other cause of liver disease including autoimmune hepatitis, PBC, Kiel's disease, alpha-1 antitrypsin deficiency  negative  Iron sat 31%              -NDOK showed normal CBD without evidence of stones  Hepatosplenomegaly seen               -total bilirubin 11 63, AST 1193, ALT 1241, alk-phos 179              -No evidence of acute liver failure  His hepatic synthetic function appears to be intact, his INR and platelets are both within normal limits   No encephalopathy               -his hepatitis C viral load is high, genotype 1a              -We will plan for treatment for his hepatitis C, will obtain required labs including UDS  Counselled him and his sister on this  He states he is clean    -Follow up in the office after starting treatment  4  Duodenal nodule  5  Duodenitis            -he had an MRCP that showed inflammatory changes to the 2nd part of the duodenum, likely secondary to duodenitis  -CT suggested the 1 1 cm ovoid nodule along the wall of the 2nd portion of the duodenum, possibly representing a collapsed duodenal diverticulum  -outpatient EGD/EUS was recommended, this is scheduled later this month  -follow up in the office after the procedure    6   Constipation   -he reports mild constipation, he states he has a bowel movement a couple of times per week  He has not yet tried any medications for this  He is nervous to use any kind of laxatives because he drives a machine at work and he is not able to use the bathroom on an urgent basis  -recommend increasing dietary fiber, he can try a supplement such as Metamucil as well  I discussed the importance of adequate water intake and exercise, he notes that he works a lot and so his exercise is limited     ____________________________________________________________    SUBJECTIVE:    43-year-old male past medical history of IV drug use and hepatitis C presents to the office for follow-up after recent hospitalization  He was admitted with elevated LFTs due to acute hepatitis, likely hepatitis C  He had a full workup including liver biopsy  He reports that currently he is feeling much better, the itching has resolved  He states he is very tired today because he worked until 4:00 a m  His sister is with him in the office and does help provide some history  She notes that he has been somewhat constipated recently, he has a bowel movement a couple of times per week  He denies abdominal pain, nausea, vomiting, change in appetite, hematochezia, melena unintended weight loss difficulty swallowing, early satiety or jaundice  He denies any fevers or chills  He states he is no longer using drugs and he would like to have treatment for his hepatitis-C  He has support at home including his sister  He states he has never had treatment for his hepatitis-C in the past       REVIEW OF SYSTEMS IS OTHERWISE NEGATIVE        Historical Information   Past Medical History:   Diagnosis Date    Anxiety     Depression     GERD (gastroesophageal reflux disease)     Migraine     No known problems      Past Surgical History:   Procedure Laterality Date    IR IMAGE GUIDED BIOPSY/ASPIRATION LIVER  7/9/2019    NO PAST SURGERIES       Social History   Social History     Substance and Sexual Activity   Alcohol Use No     Social History     Substance and Sexual Activity   Drug Use Yes    Types: Heroin, Prescription    Comment:  suboxen     Social History     Tobacco Use   Smoking Status Current Every Day Smoker    Packs/day: 0 50    Types: Cigarettes   Smokeless Tobacco Never Used     No family history on file  Meds/Allergies       Current Outpatient Medications:     cholestyramine sugar free (QUESTRAN LIGHT) 4 g packet    ibuprofen (MOTRIN) 600 mg tablet    buprenorphine-naloxone (SUBOXONE) 8-2 mg per SL tablet    diphenhydrAMINE (BENADRYL) 25 mg tablet    hydrOXYzine HCL (ATARAX) 25 mg tablet    nicotine (NICODERM CQ) 7 mg/24hr TD 24 hr patch    ondansetron (ZOFRAN-ODT) 4 mg disintegrating tablet    No Known Allergies        Objective     Blood pressure 120/60, pulse 68, temperature 97 8 °F (36 6 °C), temperature source Tympanic, height 5' 8" (1 727 m), weight 86 2 kg (190 lb)  PHYSICAL EXAM:      Physical Exam   Constitutional: He is oriented to person, place, and time  He appears well-developed and well-nourished  No distress  HENT:   Head: Normocephalic and atraumatic  Eyes: Right eye exhibits no discharge  Left eye exhibits no discharge  No scleral icterus  Neck: Neck supple  No tracheal deviation present  Cardiovascular: Normal rate, regular rhythm, normal heart sounds and intact distal pulses  Exam reveals no gallop and no friction rub  No murmur heard  Pulmonary/Chest: Effort normal and breath sounds normal  No respiratory distress  He has no wheezes  He has no rales  Abdominal: Soft  Bowel sounds are normal  He exhibits no distension  There is no tenderness  There is no rebound and no guarding  Neurological: He is alert and oriented to person, place, and time  Skin: Skin is warm and dry  Psychiatric: He has a normal mood and affect  Lab Results:   No visits with results within 1 Day(s) from this visit     Latest known visit with results is:   Appointment on 08/05/2019   Component Date Value    Total Bilirubin 08/05/2019 1 24*    Bilirubin, Direct 08/05/2019 0 65*    Alkaline Phosphatase 08/05/2019 170*    AST 08/05/2019 45     ALT 08/05/2019 116*    Total Protein 08/05/2019 7 7     Albumin 08/05/2019 3 9          Radiology Results:   No results found

## 2019-09-18 ENCOUNTER — TELEPHONE (OUTPATIENT)
Dept: GASTROENTEROLOGY | Facility: AMBULARY SURGERY CENTER | Age: 39
End: 2019-09-18

## 2019-09-18 DIAGNOSIS — B18.2 HEP C W/O COMA, CHRONIC (HCC): Primary | ICD-10-CM

## 2019-09-18 NOTE — TELEPHONE ENCOUNTER
All labs ordered  No US ordered - previous US done on 7/2 and liver biopsy on 7/9  Patient aware and will get labs done as soon as he can  Patient knows I will follow him through his whole treatment and will call if any questions or concerns  Phone number given  I will also call when labs are results to discuss

## 2019-09-20 ENCOUNTER — APPOINTMENT (OUTPATIENT)
Dept: LAB | Facility: HOSPITAL | Age: 39
End: 2019-09-20
Payer: COMMERCIAL

## 2019-09-20 DIAGNOSIS — R79.89 ELEVATED LFTS: ICD-10-CM

## 2019-09-20 DIAGNOSIS — B18.2 HEP C W/O COMA, CHRONIC (HCC): ICD-10-CM

## 2019-09-20 LAB
ALBUMIN SERPL BCP-MCNC: 4 G/DL (ref 3.5–5)
ALP SERPL-CCNC: 125 U/L (ref 46–116)
ALT SERPL W P-5'-P-CCNC: 66 U/L (ref 12–78)
ANION GAP SERPL CALCULATED.3IONS-SCNC: 8 MMOL/L (ref 4–13)
AST SERPL W P-5'-P-CCNC: 30 U/L (ref 5–45)
BASOPHILS # BLD AUTO: 0.06 THOUSANDS/ΜL (ref 0–0.1)
BASOPHILS NFR BLD AUTO: 1 % (ref 0–1)
BILIRUB DIRECT SERPL-MCNC: 0.18 MG/DL (ref 0–0.2)
BILIRUB SERPL-MCNC: 0.38 MG/DL (ref 0.2–1)
BUN SERPL-MCNC: 17 MG/DL (ref 5–25)
CALCIUM SERPL-MCNC: 9.1 MG/DL (ref 8.3–10.1)
CHLORIDE SERPL-SCNC: 105 MMOL/L (ref 100–108)
CO2 SERPL-SCNC: 29 MMOL/L (ref 21–32)
CREAT SERPL-MCNC: 0.83 MG/DL (ref 0.6–1.3)
EOSINOPHIL # BLD AUTO: 0.5 THOUSAND/ΜL (ref 0–0.61)
EOSINOPHIL NFR BLD AUTO: 5 % (ref 0–6)
ERYTHROCYTE [DISTWIDTH] IN BLOOD BY AUTOMATED COUNT: 12.2 % (ref 11.6–15.1)
ETHANOL SERPL-MCNC: <3 MG/DL (ref 0–3)
GFR SERPL CREATININE-BSD FRML MDRD: 113 ML/MIN/1.73SQ M
GLUCOSE P FAST SERPL-MCNC: 106 MG/DL (ref 65–99)
HAV AB SER QL IA: REACTIVE
HAV IGM SER QL: NORMAL
HBV CORE AB SER QL: ABNORMAL
HBV CORE IGM SER QL: ABNORMAL
HBV SURFACE AB SER-ACNC: <3.1 MIU/ML
HBV SURFACE AG SER QL: ABNORMAL
HCT VFR BLD AUTO: 44 % (ref 36.5–49.3)
HCV AB SER QL: ABNORMAL
HGB BLD-MCNC: 14.8 G/DL (ref 12–17)
IMM GRANULOCYTES # BLD AUTO: 0.03 THOUSAND/UL (ref 0–0.2)
IMM GRANULOCYTES NFR BLD AUTO: 0 % (ref 0–2)
INR PPP: 0.95 (ref 0.84–1.19)
LYMPHOCYTES # BLD AUTO: 3.51 THOUSANDS/ΜL (ref 0.6–4.47)
LYMPHOCYTES NFR BLD AUTO: 36 % (ref 14–44)
MCH RBC QN AUTO: 29.4 PG (ref 26.8–34.3)
MCHC RBC AUTO-ENTMCNC: 33.6 G/DL (ref 31.4–37.4)
MCV RBC AUTO: 88 FL (ref 82–98)
MONOCYTES # BLD AUTO: 0.63 THOUSAND/ΜL (ref 0.17–1.22)
MONOCYTES NFR BLD AUTO: 7 % (ref 4–12)
NEUTROPHILS # BLD AUTO: 4.97 THOUSANDS/ΜL (ref 1.85–7.62)
NEUTS SEG NFR BLD AUTO: 51 % (ref 43–75)
NRBC BLD AUTO-RTO: 0 /100 WBCS
PLATELET # BLD AUTO: 263 THOUSANDS/UL (ref 149–390)
PMV BLD AUTO: 11.4 FL (ref 8.9–12.7)
POTASSIUM SERPL-SCNC: 4 MMOL/L (ref 3.5–5.3)
PROT SERPL-MCNC: 8 G/DL (ref 6.4–8.2)
PROTHROMBIN TIME: 12.7 SECONDS (ref 11.6–14.5)
RBC # BLD AUTO: 5.03 MILLION/UL (ref 3.88–5.62)
SODIUM SERPL-SCNC: 142 MMOL/L (ref 136–145)
WBC # BLD AUTO: 9.7 THOUSAND/UL (ref 4.31–10.16)

## 2019-09-20 PROCEDURE — 83010 ASSAY OF HAPTOGLOBIN QUANT: CPT

## 2019-09-20 PROCEDURE — 82172 ASSAY OF APOLIPOPROTEIN: CPT

## 2019-09-20 PROCEDURE — 82977 ASSAY OF GGT: CPT

## 2019-09-20 PROCEDURE — 80320 DRUG SCREEN QUANTALCOHOLS: CPT

## 2019-09-20 PROCEDURE — 87389 HIV-1 AG W/HIV-1&-2 AB AG IA: CPT

## 2019-09-20 PROCEDURE — 86706 HEP B SURFACE ANTIBODY: CPT

## 2019-09-20 PROCEDURE — 80307 DRUG TEST PRSMV CHEM ANLYZR: CPT

## 2019-09-20 PROCEDURE — 36415 COLL VENOUS BLD VENIPUNCTURE: CPT

## 2019-09-20 PROCEDURE — 80053 COMPREHEN METABOLIC PANEL: CPT

## 2019-09-20 PROCEDURE — 82248 BILIRUBIN DIRECT: CPT

## 2019-09-20 PROCEDURE — 84460 ALANINE AMINO (ALT) (SGPT): CPT

## 2019-09-20 PROCEDURE — 87522 HEPATITIS C REVRS TRNSCRPJ: CPT

## 2019-09-20 PROCEDURE — 85610 PROTHROMBIN TIME: CPT

## 2019-09-20 PROCEDURE — 86708 HEPATITIS A ANTIBODY: CPT

## 2019-09-20 PROCEDURE — 86704 HEP B CORE ANTIBODY TOTAL: CPT

## 2019-09-20 PROCEDURE — 80074 ACUTE HEPATITIS PANEL: CPT

## 2019-09-20 PROCEDURE — 85025 COMPLETE CBC W/AUTO DIFF WBC: CPT

## 2019-09-20 PROCEDURE — 82247 BILIRUBIN TOTAL: CPT

## 2019-09-20 PROCEDURE — 87902 NFCT AGT GNTYP ALYS HEP C: CPT

## 2019-09-20 PROCEDURE — 83883 ASSAY NEPHELOMETRY NOT SPEC: CPT

## 2019-09-21 LAB
AMPHETAMINES UR QL SCN: NEGATIVE NG/ML
BARBITURATES UR QL SCN: NEGATIVE NG/ML
BENZODIAZ UR QL: NEGATIVE NG/ML
BZE UR QL: NEGATIVE NG/ML
CANNABINOIDS UR QL SCN: NEGATIVE NG/ML
METHADONE UR QL SCN: NEGATIVE NG/ML
OPIATES UR QL: NEGATIVE NG/ML
PCP UR QL: NEGATIVE NG/ML
PROPOXYPH UR QL SCN: NEGATIVE NG/ML

## 2019-09-22 LAB — HIV 1+2 AB+HIV1 P24 AG SERPL QL IA: NORMAL

## 2019-09-24 LAB
A2 MACROGLOB SERPL-MCNC: 166 MG/DL (ref 110–276)
ALT SERPL W P-5'-P-CCNC: 59 IU/L (ref 0–55)
APO A-I SERPL-MCNC: 130 MG/DL (ref 101–178)
BILIRUB SERPL-MCNC: 0.4 MG/DL (ref 0–1.2)
COMMENT: ABNORMAL
FIBROSIS SCORING:: ABNORMAL
FIBROSIS STAGE SERPL QL: ABNORMAL
GGT SERPL-CCNC: 102 IU/L (ref 0–65)
HAPTOGLOB SERPL-MCNC: 70 MG/DL (ref 34–200)
HCV RNA SERPL NAA+PROBE-ACNC: NORMAL IU/ML
INTERPRETATIONS: ABNORMAL
LIVER FIBR SCORE SERPL CALC.FIBROSURE: 0.26 (ref 0–0.21)
NECROINFLAMM ACTIVITY SCORING:: ABNORMAL
NECROINFLAMMATORY ACT GRADE SERPL QL: ABNORMAL
NECROINFLAMMATORY ACT SCORE SERPL: 0.34 (ref 0–0.17)
SERVICE CMNT-IMP: ABNORMAL
TEST INFORMATION: NORMAL

## 2019-09-24 NOTE — TELEPHONE ENCOUNTER
Mat Frees (wife) called to reschedule procedure states pt can't get off of work  EGD/EUS rescheduled with Dr Helms at [facility] on 10/24/2019

## 2019-09-25 NOTE — TELEPHONE ENCOUNTER
Most labs are back on [facility] and viral load was resulted today and it was NOT DETECTED  Genotype has not been resulted yet, but there will not be any virus to type  Please review results and I will call him to let him know after you review  Thank you Dr Yaima Zavaleta

## 2019-09-28 LAB
HCV GENTYP SERPL NAA+PROBE: NORMAL
HCV PLEASE NOTE: NORMAL

## 2019-10-01 ENCOUNTER — TELEPHONE (OUTPATIENT)
Dept: GASTROENTEROLOGY | Facility: AMBULARY SURGERY CENTER | Age: 39
End: 2019-10-01

## 2019-10-01 NOTE — TELEPHONE ENCOUNTER
Called Vicky today to let him know his viral load came back not detected, genotype not found  He is excited that his body cured the Hep C on its own  Expalined that he will need to do another viral load in 3 months to see if the virus is still not dectected  If there virus again is not detected, he will be cured of the Hep C  Due date of lab will be 1/2/2020  I will call him to remind him closer to the date  Also explained to stay clean he needs to stay away from any behavior that is risky for Hep C  He says he is done with that stuff

## 2019-10-18 ENCOUNTER — TELEPHONE (OUTPATIENT)
Dept: GASTROENTEROLOGY | Facility: CLINIC | Age: 39
End: 2019-10-18

## 2019-10-18 NOTE — TELEPHONE ENCOUNTER
Called [facility] for prior authorization for patient's EGD & EUS  Spoke with Lulu Peña who stated neither procedures require auth if provider is participating and procedures are done outpatient     Call Ref # Lulu Peña, 10/18/19 @10:45 AM

## 2019-10-23 ENCOUNTER — ANESTHESIA EVENT (OUTPATIENT)
Dept: GASTROENTEROLOGY | Facility: HOSPITAL | Age: 39
End: 2019-10-23

## 2019-10-24 ENCOUNTER — HOSPITAL ENCOUNTER (OUTPATIENT)
Dept: GASTROENTEROLOGY | Facility: HOSPITAL | Age: 39
Setting detail: OUTPATIENT SURGERY
Discharge: HOME | End: 2019-10-24
Attending: INTERNAL MEDICINE | Admitting: INTERNAL MEDICINE
Payer: COMMERCIAL

## 2019-10-24 ENCOUNTER — ANESTHESIA (OUTPATIENT)
Dept: GASTROENTEROLOGY | Facility: HOSPITAL | Age: 39
End: 2019-10-24

## 2019-10-24 VITALS
BODY MASS INDEX: 33.49 KG/M2 | SYSTOLIC BLOOD PRESSURE: 110 MMHG | HEART RATE: 51 BPM | DIASTOLIC BLOOD PRESSURE: 62 MMHG | OXYGEN SATURATION: 99 % | WEIGHT: 221 LBS | TEMPERATURE: 98.3 F | RESPIRATION RATE: 16 BRPM | HEIGHT: 68 IN

## 2019-10-24 DIAGNOSIS — K31.89 DUODENAL NODULE: ICD-10-CM

## 2019-10-24 DIAGNOSIS — K29.80 DUODENITIS: ICD-10-CM

## 2019-10-24 PROCEDURE — 88342 IMHCHEM/IMCYTCHM 1ST ANTB: CPT | Performed by: PATHOLOGY

## 2019-10-24 PROCEDURE — 88305 TISSUE EXAM BY PATHOLOGIST: CPT | Performed by: PATHOLOGY

## 2019-10-24 PROCEDURE — 43239 EGD BIOPSY SINGLE/MULTIPLE: CPT | Performed by: INTERNAL MEDICINE

## 2019-10-24 PROCEDURE — 43237 ENDOSCOPIC US EXAM ESOPH: CPT | Performed by: INTERNAL MEDICINE

## 2019-10-24 RX ORDER — LIDOCAINE HYDROCHLORIDE 20 MG/ML
INJECTION, SOLUTION INFILTRATION; PERINEURAL AS NEEDED
Status: DISCONTINUED | OUTPATIENT
Start: 2019-10-24 | End: 2019-10-24 | Stop reason: SURG

## 2019-10-24 RX ORDER — KETAMINE HYDROCHLORIDE 50 MG/ML
INJECTION, SOLUTION, CONCENTRATE INTRAMUSCULAR; INTRAVENOUS AS NEEDED
Status: DISCONTINUED | OUTPATIENT
Start: 2019-10-24 | End: 2019-10-24 | Stop reason: SURG

## 2019-10-24 RX ORDER — MIDAZOLAM HYDROCHLORIDE 1 MG/ML
INJECTION INTRAMUSCULAR; INTRAVENOUS AS NEEDED
Status: DISCONTINUED | OUTPATIENT
Start: 2019-10-24 | End: 2019-10-24 | Stop reason: SURG

## 2019-10-24 RX ORDER — GLYCOPYRROLATE 0.2 MG/ML
INJECTION INTRAMUSCULAR; INTRAVENOUS AS NEEDED
Status: DISCONTINUED | OUTPATIENT
Start: 2019-10-24 | End: 2019-10-24 | Stop reason: SURG

## 2019-10-24 RX ORDER — LIDOCAINE HYDROCHLORIDE 10 MG/ML
0.5 INJECTION, SOLUTION EPIDURAL; INFILTRATION; INTRACAUDAL; PERINEURAL ONCE AS NEEDED
Status: DISCONTINUED | OUTPATIENT
Start: 2019-10-24 | End: 2019-10-28 | Stop reason: HOSPADM

## 2019-10-24 RX ORDER — PROPOFOL 10 MG/ML
INJECTION, EMULSION INTRAVENOUS CONTINUOUS PRN
Status: DISCONTINUED | OUTPATIENT
Start: 2019-10-24 | End: 2019-10-24 | Stop reason: SURG

## 2019-10-24 RX ORDER — SODIUM CHLORIDE 9 MG/ML
125 INJECTION, SOLUTION INTRAVENOUS CONTINUOUS
Status: DISCONTINUED | OUTPATIENT
Start: 2019-10-24 | End: 2019-10-28 | Stop reason: HOSPADM

## 2019-10-24 RX ORDER — PROPOFOL 10 MG/ML
INJECTION, EMULSION INTRAVENOUS AS NEEDED
Status: DISCONTINUED | OUTPATIENT
Start: 2019-10-24 | End: 2019-10-24 | Stop reason: SURG

## 2019-10-24 RX ADMIN — LIDOCAINE HYDROCHLORIDE 5 ML: 20 INJECTION, SOLUTION INFILTRATION; PERINEURAL at 15:10

## 2019-10-24 RX ADMIN — MIDAZOLAM 2 MG: 1 INJECTION INTRAMUSCULAR; INTRAVENOUS at 15:09

## 2019-10-24 RX ADMIN — GLYCOPYRROLATE 0.2 MG: 0.2 INJECTION, SOLUTION INTRAMUSCULAR; INTRAVENOUS at 15:10

## 2019-10-24 RX ADMIN — KETAMINE HYDROCHLORIDE 10 MG: 50 INJECTION, SOLUTION INTRAMUSCULAR; INTRAVENOUS at 15:28

## 2019-10-24 RX ADMIN — PROPOFOL 100 MCG/KG/MIN: 10 INJECTION, EMULSION INTRAVENOUS at 15:10

## 2019-10-24 RX ADMIN — KETAMINE HYDROCHLORIDE 20 MG: 50 INJECTION, SOLUTION INTRAMUSCULAR; INTRAVENOUS at 15:10

## 2019-10-24 RX ADMIN — KETAMINE HYDROCHLORIDE 10 MG: 50 INJECTION, SOLUTION INTRAMUSCULAR; INTRAVENOUS at 15:19

## 2019-10-24 RX ADMIN — PROPOFOL 100 MG: 10 INJECTION, EMULSION INTRAVENOUS at 15:10

## 2019-10-24 RX ADMIN — SODIUM CHLORIDE 125 ML/HR: 0.9 INJECTION, SOLUTION INTRAVENOUS at 13:51

## 2019-10-24 NOTE — ANESTHESIA PREPROCEDURE EVALUATION
Review of Systems/Medical History  Patient summary reviewed  Chart reviewed  No history of anesthetic complications     Cardiovascular  EKG reviewed,    Pulmonary  Smoker cigarette smoker  ,        GI/Hepatic    GERD ,             Endo/Other     GYN       Hematology   Musculoskeletal       Neurology    Headaches,    Psychology   Anxiety, Depression ,     Comment: Hx drug abuse  Now on Suboxone         Physical Exam    Airway    Mallampati score: II  TM Distance: >3 FB  Neck ROM: full     Dental   No notable dental hx     Cardiovascular      Pulmonary      Other Findings        Anesthesia Plan  ASA Score- 2     Anesthesia Type- IV sedation with anesthesia with ASA Monitors  Additional Monitors:   Airway Plan:         Plan Factors-  Patient smoked on day of surgery  Induction-     Postoperative Plan-     Informed Consent- Anesthetic plan and risks discussed with patient  I personally reviewed this patient with the CRNA  Discussed and agreed on the Anesthesia Plan with the CRNA  Booker Suarez

## 2019-10-24 NOTE — ANESTHESIA POSTPROCEDURE EVALUATION
Post-Op Assessment Note    CV Status:  Stable  Pain Score: 0    Pain management: adequate     Mental Status:  Sleepy   Hydration Status:  Stable   PONV Controlled:  None   Airway Patency:  Patent   Post Op Vitals Reviewed: Yes      Staff: CRNA           /56 (10/24/19 1545)    Temp      Pulse 61 (10/24/19 1545)   Resp 16 (10/24/19 1545)    SpO2 98 % (10/24/19 1545)

## 2019-10-24 NOTE — H&P
History and Physical -  [facility]ology Specialists  Vicky Bettencourt 41 y o  male MRN: [id number]    HPI: Anna Brock is a 41y o  year old male who presents with abnormal MRI, duodenal nodule  Review of Systems    Historical Information   Past Medical History:   Diagnosis Date    Anxiety     Depression     GERD (gastroesophageal reflux disease)     Migraine     No known problems      Past Surgical History:   Procedure Laterality Date    IR IMAGE GUIDED BIOPSY/ASPIRATION LIVER  7/9/2019    NO PAST SURGERIES       Social History   Social History     Substance and Sexual Activity   Alcohol Use No     Social History     Substance and Sexual Activity   Drug Use Yes    Types: Prescription    Comment:  suboxen     Social History     Tobacco Use   Smoking Status Current Every Day Smoker    Packs/day: 0 50    Types: Cigarettes   Smokeless Tobacco Never Used     History reviewed  No pertinent family history  Meds/Allergies       (Not in a hospital admission)    No Known Allergies    Objective     /58   Pulse 60   Temp 98 3 °F (36 8 °C) (Oral)   Resp 18   Ht 5' 8" (1 727 m)   Wt 100 kg (221 lb)   SpO2 98%   BMI 33 60 kg/m²       PHYSICAL EXAM    Gen: NAD  CV: RRR  CHEST: Clear  ABD: soft, NT/ND  EXT: no edema  Neuro: AAO      ASSESSMENT/PLAN:  This is a 41y o  year old male here for  abnormal MRI, duodenal nodule         PLAN:   Procedure: egd/eus

## 2019-11-04 ENCOUNTER — TELEPHONE (OUTPATIENT)
Dept: GASTROENTEROLOGY | Facility: CLINIC | Age: 39
End: 2019-11-04

## 2019-11-04 NOTE — LETTER
November 4, 2019     Tova  68       Dear Mr Morin Ion:          [address] [facility] office has attempted to call you in regards to your results, however, we have been unable to get a hold of you  Please give our office a call so we can review your results and answer any questions you may have in regards to your recent EGD Biopsies taken 10/24/19                     Sincerely,              [facility]

## 2019-11-04 NOTE — TELEPHONE ENCOUNTER
----- Message from Myrtle Lee MD sent at 11/3/2019 10:17 PM EST -----  Please inform patient that biopsies from recent upper endoscopy were negative for H pylori  He should follow up in the office in 3 months time with myself or Dr Anna

## 2019-11-14 ENCOUNTER — APPOINTMENT (EMERGENCY)
Dept: RADIOLOGY | Facility: HOSPITAL | Age: 39
End: 2019-11-14
Payer: COMMERCIAL

## 2019-11-14 ENCOUNTER — HOSPITAL ENCOUNTER (EMERGENCY)
Facility: HOSPITAL | Age: 39
Discharge: HOME | End: 2019-11-14
Attending: EMERGENCY MEDICINE | Admitting: EMERGENCY MEDICINE
Payer: COMMERCIAL

## 2019-11-14 ENCOUNTER — APPOINTMENT (EMERGENCY)
Dept: NON INVASIVE DIAGNOSTICS | Facility: HOSPITAL | Age: 39
End: 2019-11-14
Payer: COMMERCIAL

## 2019-11-14 VITALS
DIASTOLIC BLOOD PRESSURE: 80 MMHG | TEMPERATURE: 98.4 F | WEIGHT: 240.3 LBS | SYSTOLIC BLOOD PRESSURE: 144 MMHG | HEART RATE: 77 BPM | OXYGEN SATURATION: 99 % | RESPIRATION RATE: 16 BRPM | BODY MASS INDEX: 36.54 KG/M2

## 2019-11-14 DIAGNOSIS — R60.0 BILATERAL LOWER EXTREMITY EDEMA: Primary | ICD-10-CM

## 2019-11-14 LAB
ALBUMIN SERPL BCP-MCNC: 4.2 G/DL (ref 3–5.2)
ALP SERPL-CCNC: 57 U/L (ref 43–122)
ALT SERPL W P-5'-P-CCNC: 28 U/L (ref 9–52)
ANION GAP SERPL CALCULATED.3IONS-SCNC: 7 MMOL/L (ref 5–14)
AST SERPL W P-5'-P-CCNC: 24 U/L (ref 17–59)
BASOPHILS # BLD AUTO: 0 THOUSANDS/ΜL (ref 0–0.1)
BASOPHILS NFR BLD AUTO: 1 % (ref 0–1)
BILIRUB SERPL-MCNC: 0.5 MG/DL
BUN SERPL-MCNC: 16 MG/DL (ref 5–25)
CALCIUM SERPL-MCNC: 9.2 MG/DL (ref 8.4–10.2)
CHLORIDE SERPL-SCNC: 104 MMOL/L (ref 97–108)
CO2 SERPL-SCNC: 28 MMOL/L (ref 22–30)
CREAT SERPL-MCNC: 0.73 MG/DL (ref 0.7–1.5)
EOSINOPHIL # BLD AUTO: 0.3 THOUSAND/ΜL (ref 0–0.4)
EOSINOPHIL NFR BLD AUTO: 4 % (ref 0–6)
ERYTHROCYTE [DISTWIDTH] IN BLOOD BY AUTOMATED COUNT: 12.8 %
GFR SERPL CREATININE-BSD FRML MDRD: 119 ML/MIN/1.73SQ M
GLUCOSE SERPL-MCNC: 123 MG/DL (ref 70–99)
HCT VFR BLD AUTO: 40.4 % (ref 41–53)
HGB BLD-MCNC: 14.1 G/DL (ref 13.5–17.5)
LYMPHOCYTES # BLD AUTO: 3 THOUSANDS/ΜL (ref 0.5–4)
LYMPHOCYTES NFR BLD AUTO: 36 % (ref 25–45)
MCH RBC QN AUTO: 29.2 PG (ref 26–34)
MCHC RBC AUTO-ENTMCNC: 34.8 G/DL (ref 31–36)
MCV RBC AUTO: 84 FL (ref 80–100)
MONOCYTES # BLD AUTO: 0.5 THOUSAND/ΜL (ref 0.2–0.9)
MONOCYTES NFR BLD AUTO: 6 % (ref 1–10)
NEUTROPHILS # BLD AUTO: 4.5 THOUSANDS/ΜL (ref 1.8–7.8)
NEUTS SEG NFR BLD AUTO: 54 % (ref 45–65)
NT-PROBNP SERPL-MCNC: 190 PG/ML (ref 0–299)
PLATELET # BLD AUTO: 193 THOUSANDS/UL (ref 150–450)
PMV BLD AUTO: 9.7 FL (ref 8.9–12.7)
POTASSIUM SERPL-SCNC: 3.9 MMOL/L (ref 3.6–5)
PROT SERPL-MCNC: 7.4 G/DL (ref 5.9–8.4)
RBC # BLD AUTO: 4.82 MILLION/UL (ref 4.5–5.9)
SODIUM SERPL-SCNC: 139 MMOL/L (ref 137–147)
TROPONIN I SERPL-MCNC: <0.01 NG/ML (ref 0–0.03)
WBC # BLD AUTO: 8.3 THOUSAND/UL (ref 4.5–11)

## 2019-11-14 PROCEDURE — 93005 ELECTROCARDIOGRAM TRACING: CPT

## 2019-11-14 PROCEDURE — 36415 COLL VENOUS BLD VENIPUNCTURE: CPT | Performed by: FAMILY MEDICINE

## 2019-11-14 PROCEDURE — 80053 COMPREHEN METABOLIC PANEL: CPT | Performed by: FAMILY MEDICINE

## 2019-11-14 PROCEDURE — 93970 EXTREMITY STUDY: CPT

## 2019-11-14 PROCEDURE — 71046 X-RAY EXAM CHEST 2 VIEWS: CPT

## 2019-11-14 PROCEDURE — 84484 ASSAY OF TROPONIN QUANT: CPT | Performed by: FAMILY MEDICINE

## 2019-11-14 PROCEDURE — 99285 EMERGENCY DEPT VISIT HI MDM: CPT

## 2019-11-14 PROCEDURE — 83880 ASSAY OF NATRIURETIC PEPTIDE: CPT | Performed by: FAMILY MEDICINE

## 2019-11-14 PROCEDURE — 99284 EMERGENCY DEPT VISIT MOD MDM: CPT | Performed by: EMERGENCY MEDICINE

## 2019-11-14 PROCEDURE — 85025 COMPLETE CBC W/AUTO DIFF WBC: CPT | Performed by: FAMILY MEDICINE

## 2019-11-14 NOTE — ED ATTENDING ATTESTATION
11/14/2019  IDorota DO, saw and evaluated the patient  I have discussed the patient with the resident/non-physician practitioner and agree with the resident's/non-physician practitioner's findings, Plan of Care, and MDM as documented in the resident's/non-physician practitioner's note, except where noted  All available labs and Radiology studies were reviewed  I was present for key portions of any procedure(s) performed by the resident/non-physician practitioner and I was immediately available to provide assistance  At this point I agree with the current assessment done in the Emergency Department  I have conducted an independent evaluation of this patient a history and physical is as follows:    ED Course     17-year-old male history of hepatitis-C and previous admissions for jaundice evaluation for other liver issues  Presents emergency room for complaints of bilateral lower extremity swelling as well as chest pain shortness of breath have been several days ago  Currently no chest pain  Will evaluate for peripheral edema versus DVT  Will also screen for EKG chest x-ray and troponin  If negative patient can follow up as outpatient with Gastroenterology for his chronic liver issues      Critical Care Time  Procedures

## 2019-11-14 NOTE — ED PROVIDER NOTES
History  Chief Complaint   Patient presents with    Edema     Bilateral swollen ankles     HPI     42 y/o M w/ PMH of IV drug use and Hepatitis C who presents with bilateral lower extremity edema  Patient endorses 2-3 weeks of swelling bilaterally in the lower extremity that has been waxing and waning  He reports that the swelling has been getting worse the past 3 days and has not subsided  Patient also reports intermittent episodes of chest pain and SOB since 3 days ago  He reports that the chest pain was 6/10, left sided, non-radiating, non-exertional, and non-pleuritic but associated with some numbness and tingling in the left arm  He denies any exacerbating or alleviating factors for the chest pain  Patient currently denies any chest pain and shortness of breath  He denies any fever, chills, nausea, vomiting, lightheadedness, headache, blurry vision, palpitations, and diaphoresis  Patient reports that he has not traveled anywhere recently or had any major surgery in the past few months  Patient smokes 1 PPD of cigarettes daily since 2 years ag  Patient is on suboxone program for his opoid dependence  Patient denies any ETOH use  Prior to Admission Medications   Prescriptions Last Dose Informant Patient Reported? Taking?    buprenorphine-naloxone (SUBOXONE) 8-2 mg per SL tablet  Self Yes No   Sig: Place 1 tablet under the tongue 2 (two) times a day   diphenhydrAMINE (BENADRYL) 25 mg tablet   No No   Sig: Take 0 5 tablets (12 5 mg total) by mouth every 6 (six) hours as needed for itching   Patient not taking: Reported on 9/12/2019   ibuprofen (MOTRIN) 600 mg tablet   No No   Sig: Take 1 tablet (600 mg total) by mouth every 6 (six) hours as needed for mild pain      Facility-Administered Medications: None       Past Medical History:   Diagnosis Date    Anxiety     Depression     GERD (gastroesophageal reflux disease)     Migraine     No known problems        Past Surgical History:   Procedure Laterality Date    IR IMAGE GUIDED BIOPSY/ASPIRATION LIVER  7/9/2019    NO PAST SURGERIES         History reviewed  No pertinent family history  I have reviewed and agree with the history as documented  Social History     Tobacco Use    Smoking status: Current Every Day Smoker     Packs/day: 0 50     Types: Cigarettes    Smokeless tobacco: Never Used   Substance Use Topics    Alcohol use: No    Drug use: Yes     Types: Prescription     Comment:  suboxen        Review of Systems   Constitutional: Negative for chills, diaphoresis, fatigue and fever  Eyes: Negative for photophobia and visual disturbance  Respiratory: Positive for shortness of breath  Negative for cough, chest tightness and wheezing  Cardiovascular: Positive for leg swelling  Negative for chest pain and palpitations  Gastrointestinal: Negative for abdominal pain, blood in stool, constipation, diarrhea and vomiting  Genitourinary: Negative for difficulty urinating, discharge, dysuria, genital sores and hematuria  Musculoskeletal: Negative for arthralgias, back pain and gait problem  Skin: Negative for color change  Neurological: Negative for dizziness, tremors, syncope, weakness, light-headedness and headaches  Psychiatric/Behavioral: Negative for agitation, behavioral problems and confusion  Physical Exam  ED Triage Vitals [11/14/19 1447]   Temperature Pulse Respirations Blood Pressure SpO2   98 4 °F (36 9 °C) 77 16 144/80 99 %      Temp Source Heart Rate Source Patient Position - Orthostatic VS BP Location FiO2 (%)   Tympanic Monitor Sitting Right arm --      Pain Score       4             Orthostatic Vital Signs  Vitals:    11/14/19 1447   BP: 144/80   Pulse: 77   Patient Position - Orthostatic VS: Sitting       Physical Exam   Constitutional: He is oriented to person, place, and time  He appears well-developed and well-nourished  HENT:   Head: Normocephalic and atraumatic     Eyes: Pupils are equal, round, and reactive to light  EOM are normal    Neck: Normal range of motion  Cardiovascular: Normal rate, regular rhythm, normal heart sounds and intact distal pulses  Exam reveals no gallop and no friction rub  No murmur heard  Pulmonary/Chest: Effort normal and breath sounds normal  No respiratory distress  He has no wheezes  He has no rales  He exhibits tenderness  Left sided chest wall tenderness on palpation  No crepitations   Abdominal: Soft  He exhibits no distension  There is no tenderness  There is no guarding  Musculoskeletal: Normal range of motion  He exhibits edema  He exhibits no tenderness or deformity    - +2 pitting edema bilaterally   - Tenderness on palpation of ankles bilaterally  - No calf tenderness on palpation     Neurological: He is alert and oriented to person, place, and time  He displays normal reflexes  No cranial nerve deficit or sensory deficit  He exhibits normal muscle tone  Coordination normal    Skin: Skin is warm  Capillary refill takes less than 2 seconds  Psychiatric: He has a normal mood and affect         ED Medications  Medications - No data to display    Diagnostic Studies  Results Reviewed     Procedure Component Value Units Date/Time    Troponin I [[id number]]  (Normal) Collected:  11/14/19 1505    Lab Status:  Final result Specimen:  Blood from Arm, Left Updated:  11/14/19 1534     Troponin I <0 01 ng/mL     NT-BNP PRO [[id number]]  (Normal) Collected:  11/14/19 1505    Lab Status:  Final result Specimen:  Blood from Arm, Left Updated:  11/14/19 1532     NT-proBNP 190 pg/mL     CBC and differential [[id number]]  (Abnormal) Collected:  11/14/19 1505    Lab Status:  Final result Specimen:  Blood from Arm, Left Updated:  11/14/19 1526     WBC 8 30 Thousand/uL      RBC 4 82 Million/uL      Hemoglobin 14 1 g/dL      Hematocrit 40 4 %      MCV 84 fL      MCH 29 2 pg      MCHC 34 8 g/dL      RDW 12 8 %      MPV 9 7 fL      Platelets 118 Thousands/uL      Neutrophils Relative 54 % Lymphocytes Relative 36 %      Monocytes Relative 6 %      Eosinophils Relative 4 %      Basophils Relative 1 %      Neutrophils Absolute 4 50 Thousands/µL      Lymphocytes Absolute 3 00 Thousands/µL      Monocytes Absolute 0 50 Thousand/µL      Eosinophils Absolute 0 30 Thousand/µL      Basophils Absolute 0 00 Thousands/µL     Comprehensive metabolic panel [[id number]]  (Abnormal) Collected:  11/14/19 1505    Lab Status:  Final result Specimen:  Blood from Arm, Left Updated:  11/14/19 1524     Sodium 139 mmol/L      Potassium 3 9 mmol/L      Chloride 104 mmol/L      CO2 28 mmol/L      ANION GAP 7 mmol/L      BUN 16 mg/dL      Creatinine 0 73 mg/dL      Glucose 123 mg/dL      Calcium 9 2 mg/dL      AST 24 U/L      ALT 28 U/L      Alkaline Phosphatase 57 U/L      Total Protein 7 4 g/dL      Albumin 4 2 g/dL      Total Bilirubin 0 50 mg/dL      eGFR 119 ml/min/1 73sq m     Narrative:       Arsen guidelines for Chronic Kidney Disease (CKD):     Stage 1 with normal or high GFR (GFR > 90 mL/min/1 73 square meters)    Stage 2 Mild CKD (GFR = 60-89 mL/min/1 73 square meters)    Stage 3A Moderate CKD (GFR = 45-59 mL/min/1 73 square meters)    Stage 3B Moderate CKD (GFR = 30-44 mL/min/1 73 square meters)    Stage 4 Severe CKD (GFR = 15-29 mL/min/1 73 square meters)    Stage 5 End Stage CKD (GFR <15 mL/min/1 73 square meters)  Note: GFR calculation is accurate only with a steady state creatinine                 XR chest 2 views   Final Result by Milena Ledesma MD (11/14 9113)      No acute cardiopulmonary disease  Workstation performed: [id number]         VAS lower limb venous duplex study, complete bilateral    (Results Pending)         Procedures  Procedures        ED Course     Atypical chest pain with bilateral lower extremity swelling  No cardiac history, PERC score of 0  Chest pain reproducible on palpation  Troponin negative X1   Bilateral Duplex ultrasound was negative for DVT  Etiology of chest pain includes G  I vs  Musculoskeletal chest pain  Peripheral edema likely from chronic hepatitis C  CMP/CBC/Pro-bmp are not remarkable  -CXR was unremarkable  -EKG for this patient showed sinus bradycardia with HR of 58 bpm, no ST changes or T wave abnormality  MDM   -Cardiac workup was negative  -CBC/CMP/Pro-BNP and troponin wnl  -Bilaterally Lower extremity duplex negative for DVT  But showed a 3 3 cm x 1 3 cm echogenic structure in right inguinal area which is consistent with an enlarged lymph  node and channels  Will need outpatient follow up   -Patient needs follow up with PCP and Gastroenterology as outpatient  -Strict precautions to the ED discussed        Disposition  Final diagnoses:   Bilateral lower extremity edema     Time reflects when diagnosis was documented in both MDM as applicable and the Disposition within this note     Time User Action Codes Description Comment    11/14/2019  3:38 PM Patricia Najera Add [R60 0] Bilateral lower extremity edema       ED Disposition     ED Disposition Condition Date/Time Comment    Discharge Stable Thu Nov 14, 2019  3:40 PM Vicky Bettencourt discharge to home/self care              Follow-up Information     Follow up With Specialties Details Why Contact Info Additional [address] Family Medicine Schedule an appointment as soon as possible for a visit in 3 days  [address], [address] [facility] [address]  [address], [address], [address] [facility], [address] [place] [address], [address] [address] Heart Emergency Department Emergency Medicine Go to  If symptoms worsen [address] [address] [address] [place], MD Gastroenterology Schedule an appointment as soon as possible for a visit in 3 days  [address] [facility] [address] [facility] [address]  [phone]             Discharge Medication List as of 11/14/2019  5:09 PM      CONTINUE these medications which have NOT CHANGED    Details   buprenorphine-naloxone (SUBOXONE) 8-2 mg per SL tablet Place 1 tablet under the tongue 2 (two) times a day, Historical Med      diphenhydrAMINE (BENADRYL) 25 mg tablet Take 0 5 tablets (12 5 mg total) by mouth every 6 (six) hours as needed for itching, Starting Tue 7/9/2019, Print      ibuprofen (MOTRIN) 600 mg tablet Take 1 tablet (600 mg total) by mouth every 6 (six) hours as needed for mild pain, Starting Tue 7/9/2019, Print           No discharge procedures on file  ED Provider  Attending physically available and evaluated Vicky Bettencourt I managed the patient along with the ED Attending      Electronically Signed by         Dawson Abebe MD  11/14/19 1800

## 2019-11-15 LAB
ATRIAL RATE: 58 BPM
P AXIS: 57 DEGREES
PR INTERVAL: 132 MS
QRS AXIS: 39 DEGREES
QRSD INTERVAL: 94 MS
QT INTERVAL: 430 MS
QTC INTERVAL: 422 MS
T WAVE AXIS: 21 DEGREES
VENTRICULAR RATE: 58 BPM

## 2019-11-15 PROCEDURE — 93010 ELECTROCARDIOGRAM REPORT: CPT | Performed by: INTERNAL MEDICINE

## 2019-11-15 PROCEDURE — 93970 EXTREMITY STUDY: CPT | Performed by: SURGERY

## 2020-04-01 ENCOUNTER — APPOINTMENT (OUTPATIENT)
Dept: LAB | Facility: HOSPITAL | Age: 40
End: 2020-04-01
Attending: INTERNAL MEDICINE

## 2020-04-01 DIAGNOSIS — B18.2 HEP C W/O COMA, CHRONIC (HCC): ICD-10-CM

## 2020-04-01 PROCEDURE — 36415 COLL VENOUS BLD VENIPUNCTURE: CPT

## 2020-04-01 PROCEDURE — 87522 HEPATITIS C REVRS TRNSCRPJ: CPT

## 2020-04-03 ENCOUNTER — TELEPHONE (OUTPATIENT)
Dept: GASTROENTEROLOGY | Facility: AMBULARY SURGERY CENTER | Age: 40
End: 2020-04-03

## 2020-04-03 LAB
HCV RNA SERPL NAA+PROBE-ACNC: NORMAL IU/ML
TEST INFORMATION: NORMAL

## 2020-06-17 ENCOUNTER — TELEPHONE (OUTPATIENT)
Dept: GASTROENTEROLOGY | Facility: AMBULARY SURGERY CENTER | Age: 40
End: 2020-06-17

## 2021-07-16 ENCOUNTER — HOSPITAL ENCOUNTER (EMERGENCY)
Facility: HOSPITAL | Age: 41
Discharge: HOME | End: 2021-07-16
Attending: EMERGENCY MEDICINE | Admitting: EMERGENCY MEDICINE
Payer: COMMERCIAL

## 2021-07-16 VITALS
RESPIRATION RATE: 18 BRPM | DIASTOLIC BLOOD PRESSURE: 63 MMHG | WEIGHT: 230.82 LBS | TEMPERATURE: 97.8 F | OXYGEN SATURATION: 96 % | HEART RATE: 70 BPM | SYSTOLIC BLOOD PRESSURE: 139 MMHG | BODY MASS INDEX: 35.1 KG/M2

## 2021-07-16 DIAGNOSIS — M54.41 ACUTE RIGHT-SIDED LOW BACK PAIN WITH RIGHT-SIDED SCIATICA: Primary | ICD-10-CM

## 2021-07-16 PROCEDURE — 99283 EMERGENCY DEPT VISIT LOW MDM: CPT

## 2021-07-16 PROCEDURE — 99284 EMERGENCY DEPT VISIT MOD MDM: CPT | Performed by: EMERGENCY MEDICINE

## 2021-07-16 PROCEDURE — 96372 THER/PROPH/DIAG INJ SC/IM: CPT

## 2021-07-16 RX ORDER — METHOCARBAMOL 500 MG/1
500 TABLET, FILM COATED ORAL 2 TIMES DAILY
Qty: 20 TABLET | Refills: 0 | Status: SHIPPED | OUTPATIENT
Start: 2021-07-16

## 2021-07-16 RX ORDER — KETOROLAC TROMETHAMINE 30 MG/ML
30 INJECTION, SOLUTION INTRAMUSCULAR; INTRAVENOUS ONCE
Status: COMPLETED | OUTPATIENT
Start: 2021-07-16 | End: 2021-07-16

## 2021-07-16 RX ORDER — NAPROXEN 500 MG/1
500 TABLET ORAL 2 TIMES DAILY WITH MEALS
Qty: 20 TABLET | Refills: 0 | Status: SHIPPED | OUTPATIENT
Start: 2021-07-16 | End: 2021-07-26

## 2021-07-16 RX ADMIN — KETOROLAC TROMETHAMINE 30 MG: 30 INJECTION, SOLUTION INTRAMUSCULAR; INTRAVENOUS at 16:26

## 2021-07-16 NOTE — DISCHARGE INSTRUCTIONS
Take the naprosyn twice daily for the next 10 days  Use the Robaxin as needed for muscle spasm  Use an electric heating pad over your sore muscles, 4-5 times daily, 20 minutes each time  Make sure to drink plenty of fluids  [address]  [employer] veces al día stanislav los próximos 10 días  Utilice Robaxin según sea necesario para los espasmos musculares  Use leonila almohadilla térmica eléctrica sobre los músculos adoloridos, 4-5 veces al día, 20 minutos cada vez  Asegúrese de beber muchos líquidos

## 2021-07-16 NOTE — ED PROVIDER NOTES
History  Chief Complaint   Patient presents with    Back Pain     back pain that radiates down right leg  46 YO male presents with Right-sided low back pain today  Pt states this began soon after work, he does have a job that requires lifting heavy objects  States he wa feeling generally well at work but low back pain worsened soon after  Pt has been aching, constant, radiating down into the posterior aspect of the Right leg  He has no weakness, numbness, no urinary or fecal incontinence, no fevers  Pt denies similar in the past  He did take ibuprofen and aspirin without relief of his pain  Pt denies CP/SOB/F/C/N/V/D/C, no dysuria, burning on urination or blood in urine  History provided by:  Patient and significant other   used: No    Back Pain  Location:  Lumbar spine  Quality:  Aching  Radiates to:  R posterior upper leg  Pain severity:  Moderate  Onset quality:  Gradual  Duration:  4 hours  Timing:  Constant  Progression:  Unchanged  Chronicity:  New  Context: lifting heavy objects    Relieved by:  Being still  Worsened by: Movement  Ineffective treatments:  NSAIDs  Associated symptoms: no abdominal pain, no bladder incontinence, no bowel incontinence, no chest pain, no dysuria, no fever, no numbness, no tingling and no weakness        Prior to Admission Medications   Prescriptions Last Dose Informant Patient Reported? Taking? buprenorphine-naloxone (SUBOXONE) 8-2 mg per SL tablet Unknown at Unknown time Self Yes No   Sig: Place 1 tablet under the tongue 2 (two) times a day      Facility-Administered Medications: None       Past Medical History:   Diagnosis Date    Anxiety     Depression     GERD (gastroesophageal reflux disease)     Migraine     No known problems        Past Surgical History:   Procedure Laterality Date    IR BIOPSY LIVER MASS  7/9/2019    NO PAST SURGERIES         History reviewed  No pertinent family history    I have reviewed and agree with the history as documented  E-Cigarette/Vaping     E-Cigarette/Vaping Substances     Social History     Tobacco Use    Smoking status: Current Every Day Smoker     Packs/day: 0 50     Types: Cigarettes    Smokeless tobacco: Never Used   Substance Use Topics    Alcohol use: No    Drug use: Yes     Types: Prescription     Comment:  suboxen       Review of Systems   Constitutional: Negative for fever  HENT: Negative for dental problem  Eyes: Negative for visual disturbance  Respiratory: Negative for shortness of breath  Cardiovascular: Negative for chest pain  Gastrointestinal: Negative for abdominal pain, bowel incontinence, nausea and vomiting  Genitourinary: Negative for bladder incontinence, dysuria and frequency  Musculoskeletal: Positive for back pain  Negative for neck pain and neck stiffness  Skin: Negative for rash  Neurological: Negative for dizziness, tingling, weakness, light-headedness and numbness  Psychiatric/Behavioral: Negative for agitation, behavioral problems and confusion  All other systems reviewed and are negative  Physical Exam  Physical Exam  Vitals and nursing note reviewed  Constitutional:       Appearance: He is well-developed  HENT:      Head: Normocephalic and atraumatic  Eyes:      Extraocular Movements: Extraocular movements intact  Cardiovascular:      Rate and Rhythm: Normal rate  Pulmonary:      Effort: Pulmonary effort is normal    Abdominal:      General: There is no distension  Musculoskeletal:         General: Normal range of motion  Cervical back: Normal range of motion  Back:    Skin:     Findings: No rash  Neurological:      Mental Status: He is alert and oriented to person, place, and time     Psychiatric:         Behavior: Behavior normal          Vital Signs  ED Triage Vitals   Temperature Pulse Respirations Blood Pressure SpO2   07/16/21 1525 07/16/21 1525 07/16/21 1525 07/16/21 1525 07/16/21 1525   97 8 °F (36 6 °C) 70 18 139/63 96 %      Temp Source Heart Rate Source Patient Position - Orthostatic VS BP Location FiO2 (%)   07/16/21 1525 07/16/21 1525 07/16/21 1525 07/16/21 1525 --   Oral Monitor Sitting Right arm       Pain Score       07/16/21 1626       7           Vitals:    07/16/21 1525   BP: 139/63   Pulse: 70   Patient Position - Orthostatic VS: Sitting         Visual Acuity      ED Medications  Medications   ketorolac (TORADOL) injection 30 mg (30 mg Intramuscular Given 7/16/21 1626)       Diagnostic Studies  Results Reviewed     None                 No orders to display              Procedures  Procedures         ED Course                             SBIRT 20yo+      Most Recent Value   SBIRT (23 yo +)   In order to provide better care to our patients, we are screening all of our patients for alcohol and drug use  Would it be okay to ask you these screening questions? Unable to answer at this time Filed at: 07/16/2021 1532                    [facility]  Number of Diagnoses or Management Options  Acute right-sided low back pain with right-sided sciatica: new and requires workup  Diagnosis management comments: 1  Back pain - Pt with gradual onset of MSK back pain that began soon after work which entails heavy lifting  Pt has no neurologic deficits, he has no midline pain  Will prescribe NSAIDs, muscle relaxers          Amount and/or Complexity of Data Reviewed  Obtain history from someone other than the patient: yes    Patient Progress  Patient progress: stable      Disposition  Final diagnoses:   Acute right-sided low back pain with right-sided sciatica     Time reflects when diagnosis was documented in both MDM as applicable and the Disposition within this note     Time User Action Codes Description Comment    7/16/2021  4:14 PM Jose Damian [M54 41] Acute right-sided low back pain with right-sided sciatica       ED Disposition     ED Disposition Condition Date/Time Comment    Discharge Stable Fri Jul 16, 2021  4:14 PM Vicky Bettencourt discharge to home/self care  Follow-up Information    None         Discharge Medication List as of 7/16/2021  4:16 PM      START taking these medications    Details   methocarbamol (ROBAXIN) 500 mg tablet Take 1 tablet (500 mg total) by mouth 2 (two) times a day, Starting Fri 7/16/2021, Normal      naproxen (NAPROSYN) 500 mg tablet Take 1 tablet (500 mg total) by mouth 2 (two) times a day with meals for 10 days, Starting Fri 7/16/2021, Until Mon 7/26/2021, Normal         CONTINUE these medications which have NOT CHANGED    Details   buprenorphine-naloxone (SUBOXONE) 8-2 mg per SL tablet Place 1 tablet under the tongue 2 (two) times a day, Historical Med           No discharge procedures on file      PDMP Review     None          ED Provider  Electronically Signed by           David Waters MD  07/16/21 8582

## 2021-07-16 NOTE — Clinical Note
Gonzales Branch was seen and treated in our emergency department on 7/16/2021  Diagnosis:     Vicky  may return to work on return date  He may return on this date: 07/18/2021         If you have any questions or concerns, please don't hesitate to call        Amisha dAler MD    ______________________________           _______________          _______________  Hospital Representative                              Date                                Time

## 2021-10-08 ENCOUNTER — APPOINTMENT (EMERGENCY)
Dept: CT IMAGING | Facility: HOSPITAL | Age: 41
End: 2021-10-08
Payer: COMMERCIAL

## 2021-10-08 ENCOUNTER — HOSPITAL ENCOUNTER (EMERGENCY)
Facility: HOSPITAL | Age: 41
Discharge: HOME | End: 2021-10-08
Attending: EMERGENCY MEDICINE | Admitting: EMERGENCY MEDICINE
Payer: COMMERCIAL

## 2021-10-08 VITALS
DIASTOLIC BLOOD PRESSURE: 64 MMHG | BODY MASS INDEX: 35.16 KG/M2 | TEMPERATURE: 98.5 F | RESPIRATION RATE: 16 BRPM | HEART RATE: 56 BPM | WEIGHT: 231.26 LBS | SYSTOLIC BLOOD PRESSURE: 111 MMHG | OXYGEN SATURATION: 97 %

## 2021-10-08 DIAGNOSIS — M54.9 RIGHT-SIDED BACK PAIN: Primary | ICD-10-CM

## 2021-10-08 LAB
ALBUMIN SERPL BCP-MCNC: 4 G/DL (ref 3.5–5)
ALP SERPL-CCNC: 102 U/L (ref 46–116)
ALT SERPL W P-5'-P-CCNC: 39 U/L (ref 12–78)
ANION GAP SERPL CALCULATED.3IONS-SCNC: 9 MMOL/L (ref 4–13)
AST SERPL W P-5'-P-CCNC: 33 U/L (ref 5–45)
BASOPHILS # BLD AUTO: 0.07 THOUSANDS/ΜL (ref 0–0.1)
BASOPHILS NFR BLD AUTO: 1 % (ref 0–1)
BILIRUB SERPL-MCNC: 0.36 MG/DL (ref 0.2–1)
BILIRUB UR QL STRIP: NEGATIVE
BUN SERPL-MCNC: 24 MG/DL (ref 5–25)
CALCIUM SERPL-MCNC: 8.8 MG/DL (ref 8.3–10.1)
CHLORIDE SERPL-SCNC: 105 MMOL/L (ref 100–108)
CLARITY UR: CLEAR
CLARITY, POC: CLEAR
CO2 SERPL-SCNC: 26 MMOL/L (ref 21–32)
COLOR UR: YELLOW
COLOR, POC: YELLOW
CREAT SERPL-MCNC: 1 MG/DL (ref 0.6–1.3)
EOSINOPHIL # BLD AUTO: 0.32 THOUSAND/ΜL (ref 0–0.61)
EOSINOPHIL NFR BLD AUTO: 3 % (ref 0–6)
ERYTHROCYTE [DISTWIDTH] IN BLOOD BY AUTOMATED COUNT: 12.2 % (ref 11.6–15.1)
GFR SERPL CREATININE-BSD FRML MDRD: 95 ML/MIN/1.73SQ M
GLUCOSE SERPL-MCNC: 111 MG/DL (ref 65–140)
GLUCOSE UR STRIP-MCNC: NEGATIVE MG/DL
HCT VFR BLD AUTO: 39.6 % (ref 36.5–49.3)
HGB BLD-MCNC: 13.8 G/DL (ref 12–17)
HGB UR QL STRIP.AUTO: NEGATIVE
IMM GRANULOCYTES # BLD AUTO: 0.03 THOUSAND/UL (ref 0–0.2)
IMM GRANULOCYTES NFR BLD AUTO: 0 % (ref 0–2)
KETONES UR STRIP-MCNC: NEGATIVE MG/DL
LEUKOCYTE ESTERASE UR QL STRIP: NEGATIVE
LIPASE SERPL-CCNC: 41 U/L (ref 73–393)
LYMPHOCYTES # BLD AUTO: 3.57 THOUSANDS/ΜL (ref 0.6–4.47)
LYMPHOCYTES NFR BLD AUTO: 37 % (ref 14–44)
MCH RBC QN AUTO: 28.9 PG (ref 26.8–34.3)
MCHC RBC AUTO-ENTMCNC: 34.8 G/DL (ref 31.4–37.4)
MCV RBC AUTO: 83 FL (ref 82–98)
MONOCYTES # BLD AUTO: 0.72 THOUSAND/ΜL (ref 0.17–1.22)
MONOCYTES NFR BLD AUTO: 7 % (ref 4–12)
NEUTROPHILS # BLD AUTO: 5.02 THOUSANDS/ΜL (ref 1.85–7.62)
NEUTS SEG NFR BLD AUTO: 52 % (ref 43–75)
NITRITE UR QL STRIP: NEGATIVE
NRBC BLD AUTO-RTO: 0 /100 WBCS
PH UR STRIP.AUTO: 6 [PH] (ref 4.5–8)
PLATELET # BLD AUTO: 242 THOUSANDS/UL (ref 149–390)
PMV BLD AUTO: 12.1 FL (ref 8.9–12.7)
POTASSIUM SERPL-SCNC: 4.5 MMOL/L (ref 3.5–5.3)
PROT SERPL-MCNC: 7.9 G/DL (ref 6.4–8.2)
PROT UR STRIP-MCNC: NEGATIVE MG/DL
RBC # BLD AUTO: 4.78 MILLION/UL (ref 3.88–5.62)
SODIUM SERPL-SCNC: 140 MMOL/L (ref 136–145)
SP GR UR STRIP.AUTO: >=1.03 (ref 1–1.03)
UROBILINOGEN UR QL STRIP.AUTO: 1 E.U./DL
WBC # BLD AUTO: 9.73 THOUSAND/UL (ref 4.31–10.16)

## 2021-10-08 PROCEDURE — 96374 THER/PROPH/DIAG INJ IV PUSH: CPT

## 2021-10-08 PROCEDURE — 83690 ASSAY OF LIPASE: CPT | Performed by: PHYSICIAN ASSISTANT

## 2021-10-08 PROCEDURE — 96361 HYDRATE IV INFUSION ADD-ON: CPT

## 2021-10-08 PROCEDURE — 74176 CT ABD & PELVIS W/O CONTRAST: CPT

## 2021-10-08 PROCEDURE — 96375 TX/PRO/DX INJ NEW DRUG ADDON: CPT

## 2021-10-08 PROCEDURE — 85025 COMPLETE CBC W/AUTO DIFF WBC: CPT | Performed by: PHYSICIAN ASSISTANT

## 2021-10-08 PROCEDURE — 81003 URINALYSIS AUTO W/O SCOPE: CPT

## 2021-10-08 PROCEDURE — 36415 COLL VENOUS BLD VENIPUNCTURE: CPT | Performed by: PHYSICIAN ASSISTANT

## 2021-10-08 PROCEDURE — 99284 EMERGENCY DEPT VISIT MOD MDM: CPT

## 2021-10-08 PROCEDURE — 80053 COMPREHEN METABOLIC PANEL: CPT | Performed by: PHYSICIAN ASSISTANT

## 2021-10-08 PROCEDURE — 99284 EMERGENCY DEPT VISIT MOD MDM: CPT | Performed by: PHYSICIAN ASSISTANT

## 2021-10-08 RX ORDER — ONDANSETRON 2 MG/ML
4 INJECTION INTRAMUSCULAR; INTRAVENOUS ONCE
Status: COMPLETED | OUTPATIENT
Start: 2021-10-08 | End: 2021-10-08

## 2021-10-08 RX ORDER — KETOROLAC TROMETHAMINE 30 MG/ML
15 INJECTION, SOLUTION INTRAMUSCULAR; INTRAVENOUS ONCE
Status: COMPLETED | OUTPATIENT
Start: 2021-10-08 | End: 2021-10-08

## 2021-10-08 RX ORDER — METHOCARBAMOL 500 MG/1
500 TABLET, FILM COATED ORAL 3 TIMES DAILY
Qty: 12 TABLET | Refills: 0 | Status: SHIPPED | OUTPATIENT
Start: 2021-10-08

## 2021-10-08 RX ORDER — METHOCARBAMOL 500 MG/1
500 TABLET, FILM COATED ORAL ONCE
Status: COMPLETED | OUTPATIENT
Start: 2021-10-08 | End: 2021-10-08

## 2021-10-08 RX ORDER — NAPROXEN 500 MG/1
500 TABLET ORAL 2 TIMES DAILY WITH MEALS
Qty: 12 TABLET | Refills: 0 | Status: SHIPPED | OUTPATIENT
Start: 2021-10-08

## 2021-10-08 RX ORDER — LIDOCAINE 50 MG/G
1 PATCH TOPICAL ONCE
Status: DISCONTINUED | OUTPATIENT
Start: 2021-10-08 | End: 2021-10-09 | Stop reason: HOSPADM

## 2021-10-08 RX ADMIN — SODIUM CHLORIDE 1000 ML: 0.9 INJECTION, SOLUTION INTRAVENOUS at 21:26

## 2021-10-08 RX ADMIN — KETOROLAC TROMETHAMINE 15 MG: 30 INJECTION, SOLUTION INTRAMUSCULAR at 21:27

## 2021-10-08 RX ADMIN — ONDANSETRON 4 MG: 2 INJECTION INTRAMUSCULAR; INTRAVENOUS at 21:26

## 2021-10-08 RX ADMIN — METHOCARBAMOL TABLETS 500 MG: 500 TABLET, COATED ORAL at 23:32

## 2021-10-08 RX ADMIN — LIDOCAINE 1 PATCH: 50 PATCH CUTANEOUS at 23:32

## 2021-10-18 ENCOUNTER — HOSPITAL ENCOUNTER (EMERGENCY)
Facility: HOSPITAL | Age: 41
Discharge: HOME | End: 2021-10-18
Attending: EMERGENCY MEDICINE | Admitting: EMERGENCY MEDICINE
Payer: COMMERCIAL

## 2021-10-18 VITALS
TEMPERATURE: 98 F | HEART RATE: 70 BPM | SYSTOLIC BLOOD PRESSURE: 130 MMHG | RESPIRATION RATE: 16 BRPM | DIASTOLIC BLOOD PRESSURE: 63 MMHG | OXYGEN SATURATION: 100 %

## 2021-10-18 DIAGNOSIS — M54.41 ACUTE RIGHT-SIDED LOW BACK PAIN WITH RIGHT-SIDED SCIATICA: Primary | ICD-10-CM

## 2021-10-18 PROCEDURE — 99284 EMERGENCY DEPT VISIT MOD MDM: CPT | Performed by: EMERGENCY MEDICINE

## 2021-10-18 PROCEDURE — 96372 THER/PROPH/DIAG INJ SC/IM: CPT

## 2021-10-18 PROCEDURE — 99283 EMERGENCY DEPT VISIT LOW MDM: CPT

## 2021-10-18 RX ORDER — METHOCARBAMOL 750 MG/1
1500 TABLET, FILM COATED ORAL EVERY 8 HOURS SCHEDULED
Qty: 42 TABLET | Refills: 0 | Status: SHIPPED | OUTPATIENT
Start: 2021-10-18 | End: 2021-10-25

## 2021-10-18 RX ORDER — KETOROLAC TROMETHAMINE 30 MG/ML
15 INJECTION, SOLUTION INTRAMUSCULAR; INTRAVENOUS ONCE
Status: COMPLETED | OUTPATIENT
Start: 2021-10-18 | End: 2021-10-18

## 2021-10-18 RX ORDER — PREDNISONE 20 MG/1
60 TABLET ORAL ONCE
Status: COMPLETED | OUTPATIENT
Start: 2021-10-18 | End: 2021-10-18

## 2021-10-18 RX ORDER — ACETAMINOPHEN 500 MG
1000 TABLET ORAL EVERY 6 HOURS PRN
Qty: 60 TABLET | Refills: 0 | Status: SHIPPED | OUTPATIENT
Start: 2021-10-18

## 2021-10-18 RX ORDER — PREDNISONE 20 MG/1
TABLET ORAL
Qty: 10 TABLET | Refills: 0 | Status: SHIPPED | OUTPATIENT
Start: 2021-10-18 | End: 2021-10-25

## 2021-10-18 RX ADMIN — PREDNISONE 60 MG: 20 TABLET ORAL at 23:43

## 2021-10-18 RX ADMIN — KETOROLAC TROMETHAMINE 15 MG: 30 INJECTION, SOLUTION INTRAMUSCULAR at 23:45

## 2021-10-21 ENCOUNTER — NURSE TRIAGE (OUTPATIENT)
Dept: PHYSICAL THERAPY | Facility: OTHER | Age: 41
End: 2021-10-21

## 2021-10-21 DIAGNOSIS — M54.50 ACUTE BILATERAL LOW BACK PAIN WITHOUT SCIATICA: Primary | ICD-10-CM

## 2021-11-02 ENCOUNTER — EVALUATION (OUTPATIENT)
Dept: PHYSICAL THERAPY | Facility: CLINIC | Age: 41
End: 2021-11-02
Payer: COMMERCIAL

## 2021-11-02 VITALS — DIASTOLIC BLOOD PRESSURE: 72 MMHG | SYSTOLIC BLOOD PRESSURE: 112 MMHG | TEMPERATURE: 98.4 F

## 2021-11-02 DIAGNOSIS — M54.50 ACUTE BILATERAL LOW BACK PAIN WITHOUT SCIATICA: ICD-10-CM

## 2021-11-02 PROCEDURE — 97161 PT EVAL LOW COMPLEX 20 MIN: CPT | Performed by: PHYSICAL THERAPIST

## 2021-11-02 PROCEDURE — 97112 NEUROMUSCULAR REEDUCATION: CPT | Performed by: PHYSICAL THERAPIST

## 2021-11-08 ENCOUNTER — APPOINTMENT (OUTPATIENT)
Dept: PHYSICAL THERAPY | Facility: CLINIC | Age: 41
End: 2021-11-08
Payer: COMMERCIAL

## 2021-11-15 ENCOUNTER — OFFICE VISIT (OUTPATIENT)
Dept: PHYSICAL THERAPY | Facility: CLINIC | Age: 41
End: 2021-11-15
Payer: COMMERCIAL

## 2021-11-15 DIAGNOSIS — M54.50 ACUTE BILATERAL LOW BACK PAIN WITHOUT SCIATICA: Primary | ICD-10-CM

## 2021-11-15 PROCEDURE — 97112 NEUROMUSCULAR REEDUCATION: CPT

## 2021-11-15 PROCEDURE — 97110 THERAPEUTIC EXERCISES: CPT

## 2021-11-22 ENCOUNTER — OFFICE VISIT (OUTPATIENT)
Dept: PHYSICAL THERAPY | Facility: CLINIC | Age: 41
End: 2021-11-22
Payer: COMMERCIAL

## 2021-11-22 DIAGNOSIS — M54.50 ACUTE BILATERAL LOW BACK PAIN WITHOUT SCIATICA: Primary | ICD-10-CM

## 2021-11-22 PROCEDURE — 97110 THERAPEUTIC EXERCISES: CPT

## 2021-11-22 PROCEDURE — 97112 NEUROMUSCULAR REEDUCATION: CPT | Performed by: PHYSICAL THERAPIST

## 2023-11-08 ENCOUNTER — HOSPITAL ENCOUNTER (EMERGENCY)
Facility: HOSPITAL | Age: 43
Discharge: HOME | End: 2023-11-08
Attending: INTERNAL MEDICINE
Payer: COMMERCIAL

## 2023-11-08 ENCOUNTER — APPOINTMENT (EMERGENCY)
Dept: RADIOLOGY | Facility: HOSPITAL | Age: 43
End: 2023-11-08
Payer: COMMERCIAL

## 2023-11-08 VITALS
TEMPERATURE: 97.9 F | WEIGHT: 230.16 LBS | RESPIRATION RATE: 16 BRPM | OXYGEN SATURATION: 96 % | DIASTOLIC BLOOD PRESSURE: 66 MMHG | HEART RATE: 47 BPM | SYSTOLIC BLOOD PRESSURE: 126 MMHG | BODY MASS INDEX: 35 KG/M2

## 2023-11-08 DIAGNOSIS — R07.9 CHEST PAIN: Primary | ICD-10-CM

## 2023-11-08 DIAGNOSIS — R05.9 COUGH: ICD-10-CM

## 2023-11-08 LAB
2HR DELTA HS TROPONIN: <0 NG/L
ANION GAP SERPL CALCULATED.3IONS-SCNC: 5 MMOL/L
ATRIAL RATE: 58 BPM
BASOPHILS # BLD AUTO: 0.08 THOUSANDS/ÂΜL (ref 0–0.1)
BASOPHILS NFR BLD AUTO: 1 % (ref 0–1)
BUN SERPL-MCNC: 20 MG/DL (ref 5–25)
CALCIUM SERPL-MCNC: 8.9 MG/DL (ref 8.4–10.2)
CARDIAC TROPONIN I PNL SERPL HS: 2 NG/L
CARDIAC TROPONIN I PNL SERPL HS: <2 NG/L
CHLORIDE SERPL-SCNC: 107 MMOL/L (ref 96–108)
CO2 SERPL-SCNC: 25 MMOL/L (ref 21–32)
CREAT SERPL-MCNC: 0.87 MG/DL (ref 0.6–1.3)
EOSINOPHIL # BLD AUTO: 0.3 THOUSAND/ÂΜL (ref 0–0.61)
EOSINOPHIL NFR BLD AUTO: 3 % (ref 0–6)
ERYTHROCYTE [DISTWIDTH] IN BLOOD BY AUTOMATED COUNT: 12.2 % (ref 11.6–15.1)
FLUAV RNA RESP QL NAA+PROBE: NEGATIVE
FLUBV RNA RESP QL NAA+PROBE: NEGATIVE
GFR SERPL CREATININE-BSD FRML MDRD: 107 ML/MIN/1.73SQ M
GLUCOSE SERPL-MCNC: 98 MG/DL (ref 65–140)
HCT VFR BLD AUTO: 39.4 % (ref 36.5–49.3)
HGB BLD-MCNC: 13.6 G/DL (ref 12–17)
IMM GRANULOCYTES # BLD AUTO: 0.02 THOUSAND/UL (ref 0–0.2)
IMM GRANULOCYTES NFR BLD AUTO: 0 % (ref 0–2)
LYMPHOCYTES # BLD AUTO: 3.47 THOUSANDS/ÂΜL (ref 0.6–4.47)
LYMPHOCYTES NFR BLD AUTO: 39 % (ref 14–44)
MCH RBC QN AUTO: 28.8 PG (ref 26.8–34.3)
MCHC RBC AUTO-ENTMCNC: 34.5 G/DL (ref 31.4–37.4)
MCV RBC AUTO: 83 FL (ref 82–98)
MONOCYTES # BLD AUTO: 0.55 THOUSAND/ÂΜL (ref 0.17–1.22)
MONOCYTES NFR BLD AUTO: 6 % (ref 4–12)
NEUTROPHILS # BLD AUTO: 4.4 THOUSANDS/ÂΜL (ref 1.85–7.62)
NEUTS SEG NFR BLD AUTO: 51 % (ref 43–75)
NRBC BLD AUTO-RTO: 0 /100 WBCS
P AXIS: 60 DEGREES
PLATELET # BLD AUTO: 218 THOUSANDS/UL (ref 149–390)
PMV BLD AUTO: 11.9 FL (ref 8.9–12.7)
POTASSIUM SERPL-SCNC: 4 MMOL/L (ref 3.5–5.3)
PR INTERVAL: 142 MS
QRS AXIS: 52 DEGREES
QRSD INTERVAL: 90 MS
QT INTERVAL: 406 MS
QTC INTERVAL: 398 MS
RBC # BLD AUTO: 4.73 MILLION/UL (ref 3.88–5.62)
RSV RNA RESP QL NAA+PROBE: NEGATIVE
SARS-COV-2 RNA RESP QL NAA+PROBE: NEGATIVE
SODIUM SERPL-SCNC: 137 MMOL/L (ref 135–147)
T WAVE AXIS: 6 DEGREES
VENTRICULAR RATE: 58 BPM
WBC # BLD AUTO: 8.82 THOUSAND/UL (ref 4.31–10.16)

## 2023-11-08 PROCEDURE — 84484 ASSAY OF TROPONIN QUANT: CPT | Performed by: INTERNAL MEDICINE

## 2023-11-08 PROCEDURE — 80048 BASIC METABOLIC PNL TOTAL CA: CPT | Performed by: INTERNAL MEDICINE

## 2023-11-08 PROCEDURE — 96376 TX/PRO/DX INJ SAME DRUG ADON: CPT

## 2023-11-08 PROCEDURE — 99285 EMERGENCY DEPT VISIT HI MDM: CPT

## 2023-11-08 PROCEDURE — 85025 COMPLETE CBC W/AUTO DIFF WBC: CPT | Performed by: INTERNAL MEDICINE

## 2023-11-08 PROCEDURE — 99285 EMERGENCY DEPT VISIT HI MDM: CPT | Performed by: INTERNAL MEDICINE

## 2023-11-08 PROCEDURE — 71045 X-RAY EXAM CHEST 1 VIEW: CPT

## 2023-11-08 PROCEDURE — 36415 COLL VENOUS BLD VENIPUNCTURE: CPT | Performed by: INTERNAL MEDICINE

## 2023-11-08 PROCEDURE — 0241U HB NFCT DS VIR RESP RNA 4 TRGT: CPT | Performed by: INTERNAL MEDICINE

## 2023-11-08 PROCEDURE — 93010 ELECTROCARDIOGRAM REPORT: CPT | Performed by: INTERNAL MEDICINE

## 2023-11-08 PROCEDURE — 93005 ELECTROCARDIOGRAM TRACING: CPT

## 2023-11-08 PROCEDURE — 96374 THER/PROPH/DIAG INJ IV PUSH: CPT

## 2023-11-08 RX ORDER — KETOROLAC TROMETHAMINE 30 MG/ML
15 INJECTION, SOLUTION INTRAMUSCULAR; INTRAVENOUS ONCE
Status: COMPLETED | OUTPATIENT
Start: 2023-11-08 | End: 2023-11-08

## 2023-11-08 RX ORDER — BENZONATATE 100 MG/1
100 CAPSULE ORAL EVERY 8 HOURS
Qty: 21 CAPSULE | Refills: 0 | Status: SHIPPED | OUTPATIENT
Start: 2023-11-08

## 2023-11-08 RX ORDER — LIDOCAINE 50 MG/G
1 PATCH TOPICAL ONCE
Status: DISCONTINUED | OUTPATIENT
Start: 2023-11-08 | End: 2023-11-08 | Stop reason: HOSPADM

## 2023-11-08 RX ORDER — AMOXICILLIN 500 MG/1
1000 CAPSULE ORAL 3 TIMES DAILY
Qty: 30 CAPSULE | Refills: 0 | Status: SHIPPED | OUTPATIENT
Start: 2023-11-08 | End: 2023-11-13

## 2023-11-08 RX ORDER — NAPROXEN 500 MG/1
500 TABLET ORAL 2 TIMES DAILY WITH MEALS
Qty: 30 TABLET | Refills: 0 | Status: SHIPPED | OUTPATIENT
Start: 2023-11-08

## 2023-11-08 RX ORDER — SODIUM CHLORIDE 9 MG/ML
3 INJECTION INTRAVENOUS
Status: DISCONTINUED | OUTPATIENT
Start: 2023-11-08 | End: 2023-11-08 | Stop reason: HOSPADM

## 2023-11-08 RX ADMIN — KETOROLAC TROMETHAMINE 15 MG: 30 INJECTION, SOLUTION INTRAMUSCULAR; INTRAVENOUS at 19:46

## 2023-11-08 RX ADMIN — KETOROLAC TROMETHAMINE 15 MG: 30 INJECTION, SOLUTION INTRAMUSCULAR; INTRAVENOUS at 18:08

## 2023-11-08 NOTE — Clinical Note
Grace Lopez was seen and treated in our emergency department on 11/8/2023. Diagnosis:     Izarael  . He may return on this date: If you have any questions or concerns, please don't hesitate to call.       May Jarvis MD    ______________________________           _______________          _______________  [facility] [place]                              Date                                Time

## 2023-11-08 NOTE — ED PROVIDER NOTES
History  Chief Complaint   Patient presents with    Chest Pain     Pt reports cough x 2 month and then developed CP. Pt reports pain went away but this morning L sided chest pressure. C/o SOB     HPI  17-year-old man with a history of opioid use disorder on Suboxone, anxiety, depression, GERD and migraines presents to ED for evaluation of chest pain. Patient reports he has been coughing and having chest pain for 2 months. That chest pain worsened this morning which caused him to come to the ER this evening. He reports left-sided chest pain that does not radiate. He denies ripping or tearing chest pain. Denies associated dyspnea. Patient reports he smokes cigarettes daily, recently been trying to cut down. No history of pulmonary or cardiac disease. Patient denies headache, visual changes, dizziness, fevers, chills, palpitations, abdominal pain, diarrhea, melena, hematochezia, dysuria, new skin rashes or numbness or tingling of the extremities. Prior to Admission Medications   Prescriptions Last Dose Informant Patient Reported? Taking? buprenorphine-naloxone (SUBOXONE) 8-2 mg per SL tablet  Self Yes No   Sig: Place 1 tablet under the tongue 2 (two) times a day   methocarbamol (ROBAXIN) 500 mg tablet   No No   Sig: Take 1 tablet (500 mg total) by mouth 2 (two) times a day   methocarbamol (ROBAXIN) 500 mg tablet   No No   Sig: Take 1 tablet (500 mg total) by mouth 3 (three) times a day      Facility-Administered Medications: None       Past Medical History:   Diagnosis Date    Anxiety     Depression     GERD (gastroesophageal reflux disease)     Migraine     No known problems        Past Surgical History:   Procedure Laterality Date    IR BIOPSY LIVER MASS  7/9/2019    NO PAST SURGERIES         No family history on file. I have reviewed and agree with the history as documented.     E-Cigarette/Vaping    E-Cigarette Use Never User      E-Cigarette/Vaping Substances     Social History     Tobacco Use Smoking status: Every Day     Packs/day: 0.50     Types: Cigarettes    Smokeless tobacco: Never   Vaping Use    Vaping Use: Never used   Substance Use Topics    Alcohol use: Yes     Comment: social    Drug use: Not Currently     Types: Prescription     Comment:  suboxen       Review of Systems   All other systems reviewed and are negative. Physical Exam  Physical Exam  PHYSICAL EXAM    Constitutional:  Well developed, well nourished, no acute distress, non-toxic appearance    HEENT:  Conjunctiva normal. Oropharynx moist  Respiratory:  No respiratory distress, normal breath sounds  Cardiovascular:  Normal rate, normal rhythm, no murmurs, no tenderness to palpation over anterior chest  GI:  Soft, nondistended, normal bowel sounds, nontender  :  No costovertebral angle tenderness   Musculoskeletal:  No edema, no tenderness, no deformities.    Integument:  Well hydrated, no rash   Lymphatic:  No lymphadenopathy noted   Neurologic:  Alert & oriented, normal motor function, normal sensory function, no focal deficits noted   Psychiatric:  Speech and behavior appropriate       Vital Signs  ED Triage Vitals   Temperature Pulse Respirations Blood Pressure SpO2   11/08/23 1726 11/08/23 1711 11/08/23 1711 11/08/23 1711 11/08/23 1711   97.9 °F (36.6 °C) 70 18 125/60 96 %      Temp src Heart Rate Source Patient Position - Orthostatic VS BP Location FiO2 (%)   -- 11/08/23 1711 11/08/23 1711 11/08/23 1730 --    Monitor Lying Right arm       Pain Score       11/08/23 1711       4           Vitals:    11/08/23 1711 11/08/23 1730 11/08/23 1900 11/08/23 2000   BP: 125/60 120/60 119/57 126/66   Pulse: 70 58 (!) 48 (!) 47   Patient Position - Orthostatic VS: Lying Lying Lying Lying         Visual Acuity      ED Medications  Medications   ketorolac (TORADOL) injection 15 mg (15 mg Intravenous Given 11/8/23 1808)   ketorolac (TORADOL) injection 15 mg (15 mg Intravenous Given 11/8/23 1946)       Diagnostic Studies  Results Reviewed Procedure Component Value Units Date/Time    HS Troponin I 2hr [[id number]]  (Normal) Collected: 11/08/23 2010    Lab Status: Final result Specimen: Blood from Arm, Right Updated: 11/08/23 2043     hs TnI 2hr <2 ng/L      Delta 2hr hsTnI <0 ng/L     COVID/FLU/RSV [[id number]]  (Normal) Collected: 11/08/23 1807    Lab Status: Final result Specimen: Nares from Nasopharyngeal Swab Updated: 11/08/23 1853     SARS-CoV-2 Negative     INFLUENZA A PCR Negative     INFLUENZA B PCR Negative     RSV PCR Negative    Narrative:      FOR PEDIATRIC PATIENTS - copy/paste COVID Guidelines URL to browser: [email or web address]    SARS-CoV-2 assay is a Nucleic Acid Amplification assay intended for the  qualitative detection of nucleic acid from SARS-CoV-2 in nasopharyngeal  swabs. Results are for the presumptive identification of SARS-CoV-2 RNA. Positive results are indicative of infection with SARS-CoV-2, the virus  causing COVID-19, but do not rule out bacterial infection or co-infection  with other viruses. Laboratories within the [facility] and its  territories are required to report all positive results to the appropriate  public health authorities. Negative results do not preclude SARS-CoV-2  infection and should not be used as the sole basis for treatment or other  patient management decisions. Negative results must be combined with  clinical observations, patient history, and epidemiological information. This test has not been FDA cleared or approved. This test has been authorized by FDA under an Emergency Use Authorization  (EUA). This test is only authorized for the duration of time the  declaration that circumstances exist justifying the authorization of the  emergency use of an in vitro diagnostic tests for detection of SARS-CoV-2  virus and/or diagnosis of COVID-19 infection under section 564(b)(1) of  the Act, 21 U. S.C. 496CWS-4(D)(3), unless the authorization is terminated  or revoked sooner. The test has been validated but independent review by FDA  and CLIA is pending. Test performed using [employer] GeneXpert: This RT-PCR assay targets N2,  a region unique to SARS-CoV-2. A conserved region in the E-gene was chosen  for pan-Sarbecovirus detection which includes SARS-CoV-2. According to CMS-2020-01-R, this platform meets the definition of high-throughput technology.     HS Troponin 0hr (reflex protocol) [[id number]]  (Normal) Collected: 11/08/23 1807    Lab Status: Final result Specimen: Blood from Arm, Right Updated: 11/08/23 1838     hs TnI 0hr 2 ng/L     Basic metabolic panel [[id number]] Collected: 11/08/23 1807    Lab Status: Final result Specimen: Blood from Arm, Right Updated: 11/08/23 1832     Sodium 137 mmol/L      Potassium 4.0 mmol/L      Chloride 107 mmol/L      CO2 25 mmol/L      ANION GAP 5 mmol/L      BUN 20 mg/dL      Creatinine 0.87 mg/dL      Glucose 98 mg/dL      Calcium 8.9 mg/dL      eGFR 107 ml/min/1.73sq m     Narrative:      [place] guidelines for Chronic Kidney Disease (CKD):     Stage 1 with normal or high GFR (GFR > 90 mL/min/1.73 square meters)    Stage 2 Mild CKD (GFR = 60-89 mL/min/1.73 square meters)    Stage 3A Moderate CKD (GFR = 45-59 mL/min/1.73 square meters)    Stage 3B Moderate CKD (GFR = 30-44 mL/min/1.73 square meters)    Stage 4 Severe CKD (GFR = 15-29 mL/min/1.73 square meters)    Stage 5 End Stage CKD (GFR <15 mL/min/1.73 square meters)  Note: GFR calculation is accurate only with a steady state creatinine    CBC and differential [[id number]] Collected: 11/08/23 1807    Lab Status: Final result Specimen: Blood from Arm, Right Updated: 11/08/23 1817     WBC 8.82 Thousand/uL      RBC 4.73 Million/uL      Hemoglobin 13.6 g/dL      Hematocrit 39.4 %      MCV 83 fL      MCH 28.8 pg      MCHC 34.5 g/dL      RDW 12.2 %      MPV 11.9 fL      Platelets 876 Thousands/uL      nRBC 0 /100 WBCs Neutrophils Relative 51 %      Immat GRANS % 0 %      Lymphocytes Relative 39 %      Monocytes Relative 6 %      Eosinophils Relative 3 %      Basophils Relative 1 %      Neutrophils Absolute 4.40 Thousands/µL      Immature Grans Absolute 0.02 Thousand/uL      Lymphocytes Absolute 3.47 Thousands/µL      Monocytes Absolute 0.55 Thousand/µL      Eosinophils Absolute 0.30 Thousand/µL      Basophils Absolute 0.08 Thousands/µL                    X-ray chest 1 view portable   Final Result by Rowdy Vasques MD (11/09 0801)      No acute cardiopulmonary disease. Workstation performed: [id number]                    Procedures  Procedures         ED Course  ED Course as of 11/09/23 1314   Wed Nov 08, 2023   1723 EKG: sinus bradycardia, possible inferior infarct , age undetermined. Abnormal ECG   1849 CBC and BMP unremarkable   1849 hs TnI 0hr: 2   1902 COVID/FLU/RSV negative    2014 Repeat EKG: No significant changes from previous   2043 Delta 2hr hsTnI: <0             HEART Risk Score      Flowsheet Row Most Recent Value   Heart Score Risk Calculator    History 1 Filed at: 11/08/2023 1946   ECG 1 Filed at: 11/08/2023 1946   Age 0 Filed at: 11/08/2023 1946   Risk Factors 1 Filed at: 11/08/2023 1946   Troponin 0 Filed at: 11/08/2023 1946   HEART Score 3 Filed at: 11/08/2023 1946                          SBIRT 20yo+      Flowsheet Row Most Recent Value   Initial Alcohol Screen: US AUDIT-C     1. How often do you have a drink containing alcohol? 0 Filed at: 11/08/2023 1809   2. How many drinks containing alcohol do you have on a typical day you are drinking? 0 Filed at: 11/08/2023 1809   3a. Male UNDER 65: How often do you have five or more drinks on one occasion? 0 Filed at: 11/08/2023 1809   Audit-C Score 0 Filed at: 11/08/2023 1809   BRADLY: How many times in the past year have you. .. Used an illegal drug or used a prescription medication for non-medical reasons?  Never Filed at: 11/08/2023 4728 Medical Decision Making  82-year-old man with a history of opioid use disorder on Suboxone, anxiety, depression, GERD and migraines presents to ED for evaluation of chest pain. Differential diagnosis includes ACS, cardiac arrhythmia, pneumonia, bleb, pneumothorax, hemothorax, costochondritis, musculoskeletal strain, metabolic disturbances. W/U grossly unremarkable. Given duration of cough, will empirically treat for pneumonia. Amount and/or Complexity of Data Reviewed  External Data Reviewed: labs, radiology, ECG and notes. Labs: ordered. Decision-making details documented in ED Course. Radiology: ordered. Details: No acute cardiopulmonary disease  ECG/medicine tests: ordered and independent interpretation performed. Decision-making details documented in ED Course. Risk  OTC drugs. Prescription drug management. Disposition  Final diagnoses:   Chest pain   Cough     Time reflects when diagnosis was documented in both MDM as applicable and the Disposition within this note       Time User Action Codes Description Comment    11/8/2023  8:43 PM Markel Castleman Add [R07.9] Chest pain     11/8/2023  8:43 PM Markel Castleman Add [R05.9] Cough           ED Disposition       ED Disposition   Discharge    Condition   Stable    Date/Time   Wed Nov 8, 2023 2043    Comment   Vicky Bettencourt discharge to home/self care.                    Follow-up Information    None         Discharge Medication List as of 11/8/2023  8:45 PM        START taking these medications    Details   amoxicillin (AMOXIL) 500 mg capsule Take 2 capsules (1,000 mg total) by mouth 3 (three) times a day for 5 days, Starting Wed 11/8/2023, Until Mon 11/13/2023, Normal      benzonatate (TESSALON PERLES) 100 mg capsule Take 1 capsule (100 mg total) by mouth every 8 (eight) hours, Starting Wed 11/8/2023, Normal      naproxen (Naprosyn) 500 mg tablet Take 1 tablet (500 mg total) by mouth 2 (two) times a day with meals, Starting Wed 11/8/2023, Normal           CONTINUE these medications which have NOT CHANGED    Details   buprenorphine-naloxone (SUBOXONE) 8-2 mg per SL tablet Place 1 tablet under the tongue 2 (two) times a day, Historical Med      !! methocarbamol (ROBAXIN) 500 mg tablet Take 1 tablet (500 mg total) by mouth 2 (two) times a day, Starting Fri 7/16/2021, Normal      !! methocarbamol (ROBAXIN) 500 mg tablet Take 1 tablet (500 mg total) by mouth 3 (three) times a day, Starting Fri 10/8/2021, Normal       !! - Potential duplicate medications found. Please discuss with provider. No discharge procedures on file.     PDMP Review       None            ED Provider  Electronically Signed by             Luis A Orellana MD  11/09/23 0380

## 2023-11-09 LAB
ATRIAL RATE: 45 BPM
P AXIS: 56 DEGREES
PR INTERVAL: 154 MS
QRS AXIS: 62 DEGREES
QRSD INTERVAL: 92 MS
QT INTERVAL: 448 MS
QTC INTERVAL: 387 MS
T WAVE AXIS: 11 DEGREES
VENTRICULAR RATE: 45 BPM

## 2023-11-09 PROCEDURE — 93010 ELECTROCARDIOGRAM REPORT: CPT | Performed by: INTERNAL MEDICINE

## 2024-02-21 PROBLEM — K52.9 GASTROENTERITIS: Status: RESOLVED | Noted: 2019-07-01 | Resolved: 2024-02-21

## 2025-04-15 ENCOUNTER — HOSPITAL ENCOUNTER (EMERGENCY)
Facility: HOSPITAL | Age: 45
Discharge: HOME | End: 2025-04-15
Attending: EMERGENCY MEDICINE
Payer: COMMERCIAL

## 2025-04-15 ENCOUNTER — APPOINTMENT (EMERGENCY)
Dept: RADIOLOGY | Facility: HOSPITAL | Age: 45
End: 2025-04-15

## 2025-04-15 VITALS
WEIGHT: 224.87 LBS | BODY MASS INDEX: 34.19 KG/M2 | DIASTOLIC BLOOD PRESSURE: 65 MMHG | HEART RATE: 74 BPM | TEMPERATURE: 97.8 F | RESPIRATION RATE: 20 BRPM | SYSTOLIC BLOOD PRESSURE: 131 MMHG | OXYGEN SATURATION: 99 %

## 2025-04-15 DIAGNOSIS — G89.29 CHRONIC PAIN OF RIGHT KNEE: Primary | ICD-10-CM

## 2025-04-15 DIAGNOSIS — M25.561 CHRONIC PAIN OF RIGHT KNEE: Primary | ICD-10-CM

## 2025-04-15 PROCEDURE — 73564 X-RAY EXAM KNEE 4 OR MORE: CPT

## 2025-04-15 PROCEDURE — 96372 THER/PROPH/DIAG INJ SC/IM: CPT

## 2025-04-15 PROCEDURE — 99283 EMERGENCY DEPT VISIT LOW MDM: CPT

## 2025-04-15 PROCEDURE — 99284 EMERGENCY DEPT VISIT MOD MDM: CPT | Performed by: PHYSICIAN ASSISTANT

## 2025-04-15 RX ORDER — KETOROLAC TROMETHAMINE 30 MG/ML
15 INJECTION, SOLUTION INTRAMUSCULAR; INTRAVENOUS ONCE
Status: COMPLETED | OUTPATIENT
Start: 2025-04-15 | End: 2025-04-15

## 2025-04-15 RX ORDER — NAPROXEN 500 MG/1
500 TABLET ORAL 2 TIMES DAILY WITH MEALS
Qty: 12 TABLET | Refills: 0 | Status: SHIPPED | OUTPATIENT
Start: 2025-04-15

## 2025-04-15 RX ADMIN — KETOROLAC TROMETHAMINE 15 MG: 30 INJECTION, SOLUTION INTRAMUSCULAR; INTRAVENOUS at 21:04

## 2025-04-16 NOTE — ED PROVIDER NOTES
Time reflects when diagnosis was documented in both MDM as applicable and the Disposition within this note       Time User Action Codes Description Comment    4/15/2025  9:26 PM Jane Gonzalez Add [M25.561,  G89.29] Chronic pain of right knee           ED Disposition       ED Disposition   Discharge    Condition   Stable    Date/Time   Tue Apr 15, 2025  9:26 PM    Comment   Vicky Bettencourt discharge to home/self care.                   Assessment & Plan       Medical Decision Making  DDX including but not limited to: arthritis, bursitis, tendinitis, sprain, strain, fracture, meniscal tear, cellulitis, osteomyelitis, gout, Lyme disease, Baker's cyst, rheumatologic process; doubt septic arthritis or DVT or arterial occlusion.     Plan- will check xray and give IM toradol here.     Xray reviewed by me and interpreted as no acute bony abnormality. Discussed results with patient. Explained xray will be further read by radiologist and if any discrepancies arise he will be contacted.   Discussed supportive care including rest, ice, elevation, and compression. Will send rx for naproxen.   Will refer to orthopedics.     The management plan was discussed in detail with the patient at bedside and all questions were answered.  Prior to discharge, we provided both verbal and written instructions.  We discussed with the patient the signs and symptoms for which to return to the emergency department.  All questions were answered and patient was comfortable with the plan of care and discharged to home.  Instructed the patient to follow up with the primary care provider and/or specialist provided and their written instructions.  The patient verbalized understanding of our discussion and plan of care, and agrees to return to the Emergency Department for concerns and progression of illness.     At discharge, I instructed the patient to:  -follow up with pcp  -follow up with the recommended specialists  -return to the ER if symptoms  worsened or new symptoms arose  Patient agreed to this plan and was stable at time of discharge.     Amount and/or Complexity of Data Reviewed  Radiology: ordered and independent interpretation performed. Decision-making details documented in ED Course.    Risk  Prescription drug management.             Medications   ketorolac (TORADOL) injection 15 mg (15 mg Intramuscular Given 4/15/25 2104)       ED Risk Strat Scores                    No data recorded        SBIRT 22yo+      Flowsheet Row Most Recent Value   Initial Alcohol Screen: US AUDIT-C     1. How often do you have a drink containing alcohol? 0 Filed at: 04/15/2025 2103   2. How many drinks containing alcohol do you have on a typical day you are drinking?  0 Filed at: 04/15/2025 2103   3a. Male UNDER 65: How often do you have five or more drinks on one occasion? 0 Filed at: 04/15/2025 2103   Audit-C Score 0 Filed at: 04/15/2025 2103   BRADLY: How many times in the past year have you...    Used an illegal drug or used a prescription medication for non-medical reasons? Never Filed at: 04/15/2025 2103                            History of Present Illness       Chief Complaint   Patient presents with    Knee Pain     Pt reports knee pain starting 4-5 months ago and progressively worsened. -injury/trauma. -meds pta       Past Medical History:   Diagnosis Date    Anxiety     Depression     GERD (gastroesophageal reflux disease)     Migraine     No known problems       Past Surgical History:   Procedure Laterality Date    IR BIOPSY LIVER MASS  7/9/2019    NO PAST SURGERIES        History reviewed. No pertinent family history.   Social History     Tobacco Use    Smoking status: Every Day     Current packs/day: 0.50     Types: Cigarettes    Smokeless tobacco: Never   Vaping Use    Vaping status: Never Used   Substance Use Topics    Alcohol use: Yes     Comment: social    Drug use: Not Currently     Types: Prescription     Comment:  suboxen      E-Cigarette/Vaping     E-Cigarette Use Never User       E-Cigarette/Vaping Substances      I have reviewed and agree with the history as documented.     Patient is a 44-year-old male with past medical history of anxiety, depression, GERD, migraines who presents for evaluation of right knee pain.  Patient states that the pain has been going on for the past 4 to 5 months.  He states been gradually worsening.  He denies any injury or trauma.  He states pain is worse with driving, standing or sitting, going down steps.  Pain is located to the lateral aspect of the knee.  Sometimes he feels like the knee is going to give out on him.  No prior knee injuries or surgeries.  He has been taking over-the-counter ibuprofen and using a knee brace with minimal relief.  No medications taken today.  He has not been seen or evaluated for this in the past.  He denies fever, chills, chest pain, shortness breath, abdominal pain, numbness, weakness, joint swelling, warmth, bruising, rash.        Review of Systems   Constitutional:  Negative for chills and fever.   HENT:  Negative for ear pain and sore throat.    Eyes:  Negative for pain and visual disturbance.   Respiratory:  Negative for cough and shortness of breath.    Cardiovascular:  Negative for chest pain.   Gastrointestinal:  Negative for abdominal pain, nausea and vomiting.   Genitourinary:  Negative for difficulty urinating.   Musculoskeletal:  Positive for arthralgias. Negative for back pain and joint swelling.   Skin:  Negative for color change, pallor, rash and wound.   Neurological:  Negative for syncope, weakness and numbness.   All other systems reviewed and are negative.          Objective       ED Triage Vitals   Temperature Pulse Blood Pressure Respirations SpO2 Patient Position - Orthostatic VS   04/15/25 2012 04/15/25 2012 04/15/25 2013 04/15/25 2012 04/15/25 2012 04/15/25 2012   97.8 °F (36.6 °C) 74 131/65 20 99 % Sitting      Temp Source Heart Rate Source BP Location FiO2 (%) Pain Score     04/15/25 2012 04/15/25 2012 04/15/25 2012 -- 04/15/25 2012    Oral Monitor Right arm  8      Vitals      Date and Time Temp Pulse SpO2 Resp BP Pain Score FACES Pain Rating User   04/15/25 2104 -- -- -- -- -- 7 -- AA   04/15/25 2013 -- -- -- -- 131/65 -- -- DF   04/15/25 2012 97.8 °F (36.6 °C) 74 99 % 20 -- 8 -- DF            Physical Exam  Vitals and nursing note reviewed.   Constitutional:       General: He is not in acute distress.     Appearance: Normal appearance. He is well-developed. He is not ill-appearing, toxic-appearing or diaphoretic.   HENT:      Head: Normocephalic and atraumatic.      Right Ear: External ear normal.      Left Ear: External ear normal.   Eyes:      Conjunctiva/sclera: Conjunctivae normal.   Cardiovascular:      Rate and Rhythm: Normal rate.   Pulmonary:      Effort: Pulmonary effort is normal. No respiratory distress.   Abdominal:      General: Abdomen is flat. There is no distension.   Musculoskeletal:         General: No swelling.      Cervical back: Normal range of motion.      Right knee: No swelling, deformity, effusion, erythema, ecchymosis, lacerations or crepitus. Normal range of motion. Tenderness present over the lateral joint line. No patellar tendon tenderness.      Left knee: Normal.      Comments: Right knee with TTP over lateral joint line. No effusion, swelling, erythema, ecchymosis, warmth, rash, or color change. ROM intact with some discomfort. No obvious ligamentous instability. NVI distally. Pedal pulses intact. No popliteal fossa pain or swelling.    Skin:     General: Skin is warm and dry.      Capillary Refill: Capillary refill takes less than 2 seconds.   Neurological:      Mental Status: He is alert.   Psychiatric:         Mood and Affect: Mood normal.         Results Reviewed       None            XR knee 4+ vw right injury    (Results Pending)       Procedures    ED Medication and Procedure Management   Prior to Admission Medications   Prescriptions Last  Dose Informant Patient Reported? Taking?   benzonatate (TESSALON PERLES) 100 mg capsule   No No   Sig: Take 1 capsule (100 mg total) by mouth every 8 (eight) hours   buprenorphine-naloxone (SUBOXONE) 8-2 mg per SL tablet  Self Yes No   Sig: Place 1 tablet under the tongue 2 (two) times a day   methocarbamol (ROBAXIN) 500 mg tablet   No No   Sig: Take 1 tablet (500 mg total) by mouth 2 (two) times a day   methocarbamol (ROBAXIN) 500 mg tablet   No No   Sig: Take 1 tablet (500 mg total) by mouth 3 (three) times a day   naproxen (Naprosyn) 500 mg tablet   No No   Sig: Take 1 tablet (500 mg total) by mouth 2 (two) times a day with meals      Facility-Administered Medications: None     Patient's Medications   Discharge Prescriptions    NAPROXEN (EC NAPROSYN) 500 MG EC TABLET    Take 1 tablet (500 mg total) by mouth 2 (two) times a day with meals       Start Date: 4/15/2025 End Date: --       Order Dose: 500 mg       Quantity: 12 tablet    Refills: 0       ED SEPSIS DOCUMENTATION   Time reflects when diagnosis was documented in both MDM as applicable and the Disposition within this note       Time User Action Codes Description Comment    4/15/2025  9:26 PM Jane Gonzalez Add [M25.561,  G89.29] Chronic pain of right knee                  Jane Gonzalez PA-C  04/15/25 6523

## 2025-04-22 ENCOUNTER — OFFICE VISIT (OUTPATIENT)
Dept: OBGYN CLINIC | Facility: CLINIC | Age: 45
End: 2025-04-22
Attending: PHYSICIAN ASSISTANT
Payer: COMMERCIAL

## 2025-04-22 VITALS — HEIGHT: 68 IN | WEIGHT: 224 LBS | BODY MASS INDEX: 33.95 KG/M2

## 2025-04-22 DIAGNOSIS — M23.91 LOCKING OF RIGHT KNEE: Primary | ICD-10-CM

## 2025-04-22 DIAGNOSIS — M54.16 RADICULOPATHY, LUMBAR REGION: ICD-10-CM

## 2025-04-22 DIAGNOSIS — M25.561 CHRONIC PAIN OF RIGHT KNEE: ICD-10-CM

## 2025-04-22 DIAGNOSIS — G89.29 CHRONIC PAIN OF RIGHT KNEE: ICD-10-CM

## 2025-04-22 PROCEDURE — 99204 OFFICE O/P NEW MOD 45 MIN: CPT | Performed by: FAMILY MEDICINE

## 2025-04-22 NOTE — PROGRESS NOTES
Assessment:     Assessment & Plan  Chronic pain of right knee    Orders:    Ambulatory Referral to Orthopedic Surgery    MRI knee right  wo contrast; Future    BMI 34.0-34.9,adult    Orders:    Ambulatory Referral to Physical Therapy; Future    MRI knee right  wo contrast; Future    Locking of right knee    Orders:    MRI knee right  wo contrast; Future    Radiculopathy, lumbar region    Orders:    Ambulatory Referral to Physical Therapy; Future        Diagnosis and Orders:      1. Locking of right knee  MRI knee right  wo contrast      2. Chronic pain of right knee  Ambulatory Referral to Orthopedic Surgery    MRI knee right  wo contrast      3. BMI 34.0-34.9,adult  Ambulatory Referral to Physical Therapy    MRI knee right  wo contrast      4. Radiculopathy, lumbar region  Ambulatory Referral to Physical Therapy        Orders Placed This Encounter   Procedures    MRI knee right  wo contrast    Ambulatory Referral to Physical Therapy        Impression:   Right knee pain likely multi-factorial secondary to primary knee osteoarthritis as well as concerns for lateral meniscal pathology.      Conservative Management   We discussed different treatment options:  Reviewed ED documentation completed on 04/15/2025.  Treatment plan consist of x-rays and referral to Ortho/sports medicine.  Patient has also been seen in the ED for acute right sided low back pain for which she is completing formal physical therapy   Reviewed there may be a component of right sided lumbar radiculopathy.  Recommended formal physical therapy  Reviewed current medication which includes naproxen  Reviewed BMP completed on 11/2023 which was normal  Ice or Heat Therapy as needed 1-2 times daily for 10-20 minutes. As tolerated.   Over the counter Tylenol and/or NSAIDs  as needed based off your Past Medical Hx. Please follow product label for dosing and maximum limits.    Trial of over the counter Topical Analgesics such as Lidocaine cream or Voltaren  Gel, as tolerated. If skin becomes irritated, discontinue use.   Formal Handout provided on General Information of knee  Please range joint through gentle range of motion as tolerated.   Initiate Formal Physical Therapy at any preferred location.  Prescription provided for the low back.  Due to history of locking will complete right knee MRI      Imaging   Reviewed prior xrays obtain in Urgent or Emergency Department. These were reviewed in office with patient today.   04/15/2025 right knee x-ray: No acute osseous abnormality  Kellgren-Blake Classification     Present Grade Radiological findings   [] 0 No radiological findings   [x] 1 Doubtful narrowing of joint space and possible osteophytic lipping   [x] 2 Definite osteophytes and possible narrowing of joint space   [] 3 Moderate multiple osteophytes, definitive narrowing of joint space, small pseudocystic areas with sclerotic walls and possibly deformity of bone contour   [] 4 Large osteophytes, marked narrowing of joint space, severe sclerosis and definitive deformity of bone contour   **If more than 1 [x] is present patient is between grades   Pending right knee MRI for further evaluation of lateral meniscus    Procedure  No Injection performed today. May consider future corticosteroid injection depending on clinical exam/imaging.    Shared decision making, patient agreeable to plan.      Return for Follow up after completion of advanced imaging, Follow up after 6-8 wks of formal therapy.    HPI:   Vicky Bettencourt is a 44 y.o. male  who presents for evaluation of   Chief Complaint   Patient presents with    Left Knee - Pain, Swelling         Onset/Mechanism: Does not recall any trauma to the knee..  Location: Behind the kneecap, lateral joint line, posterior leg to lateral leg.  Severity: Current severity: 10/10.   Pain described as: Constant throbbing stiffness sharp  Radiation: Pain at times will radiate down posterior leg to lateral leg.  Provocative:  Seated for prolonged time, squatting, lifting, laying down, sitting.  Associated symptoms: Right knee joint will lock    Denies any hx of fracture of affected limb.   Denies any surgical history of affected limb.      Summary of treatment to-date:   Prescribed naproxen with minimal relief  Has done formal physical therapy for the back in the past    Following History Reviewed and Updated     Past Medical History:   Diagnosis Date    Anxiety     Depression     GERD (gastroesophageal reflux disease)     Migraine     No known problems      Past Surgical History:   Procedure Laterality Date    IR BIOPSY LIVER MASS  7/9/2019    NO PAST SURGERIES       No family history on file.    Social History     Substance and Sexual Activity   Alcohol Use Yes    Comment: social     Social History     Substance and Sexual Activity   Drug Use Never     Social History     Tobacco Use   Smoking Status Every Day    Current packs/day: 0.50    Types: Cigarettes   Smokeless Tobacco Never       Social Drivers of Health     Tobacco Use: High Risk (4/15/2025)    Patient History     Smoking Tobacco Use: Every Day     Smokeless Tobacco Use: Never     Passive Exposure: Not on file   Alcohol Use: Not on file   Financial Resource Strain: Not on file   Food Insecurity: Not on file   Transportation Needs: Not on file   Physical Activity: Not on file   Stress: Not on file   Social Connections: Not on file   Intimate Partner Violence: Not on file   Depression: Not on file   Housing Stability: Not on file   Utilities: Not on file   Health Literacy: Not on file        No Known Allergies    Review of Systems      Review of Systems     Review of Systems   Constitutional: Negative for chills and fever.   HENT: Negative for drooling and sneezing.    Eyes: Negative for redness.   Respiratory: Negative for cough and wheezing.    Gastrointestinal: Negative for vomiting.   Psychiatric/Behavioral: Negative for behavioral problems. The patient is not  "nervous/anxious.      All other systems negative.   Physical Exam   Physical Exam    Vitals and nursing note reviewed.  Constitutional:   Appearance. Normal Appearance.  Ht 5' 8\" (1.727 m)   Wt 102 kg (224 lb)   BMI 34.06 kg/m²     Body mass index is 34.06 kg/m².   HENT:  Head: Atraumatic.  Nose: Nose normal  Eyes: Conjunctiva/sclera: Conjunctivae normal.  Cardiovascular:   Rate and Rhythm: Bilateral equal distal pulses  Pulmonary:   Effort: Pulmonary effort is normal  Skin:   General: Skin is warm and dry.  Neurological:   General: No focal deficit present.  Mental Status: Alert and oriented to person, place, and time.   Psychiatric:   Mood and Affect: mood normal.  Behavior: Behavior normal     Musculoskeletal Exam     Ortho Exam     Right Knee:       General :   alert and oriented, in no acute distress   Gait: Normal. The patient can bear weight on the injured extremity.   Right Lower Extremity  Knee Effusion:  None.   Ecchymosis:  none   Knee ROM:  Grossly intact    Patella:  Patella does track normally and symmetrical.  (observing the patella for smooth motion while the patient contracts the quadriceps muscle)     Inspection: No erythema or warmth   Tenderness: lateral joint line   Strength: Grossly Intact Bilaterally    Stability:    Anterior drawer: negative  Posterior drawer: negative  Medial collateral ligament: negative  Lateral collateral ligament: negative     Javi's Test:     positive with lateral joint line tenderness     Sensation:   intact to light touch   Pulses: normal PT pulses        (Test not completed if space left blank )                 Procedures       Portions of the record may have been created with voice recognition software. Occasional wrong word or \"sound alike\" substitutions may have occurred due to the inherent limitations of voice recognition software. Please review the chart carefully and recognize, using context, where substitutions/typographical errors may have occurred.   "

## 2025-04-25 ENCOUNTER — TELEPHONE (OUTPATIENT)
Age: 45
End: 2025-04-25

## 2025-04-25 NOTE — TELEPHONE ENCOUNTER
Caller: Farida    Doctor: Dr. Peñaloza    Reason for call: pt was given a list of PT places where he can go for therapy and [facility] was on the list. The facility is calling to have the order faxed over to them    Call back#: [phone]

## 2025-04-29 ENCOUNTER — TELEPHONE (OUTPATIENT)
Age: 45
End: 2025-04-29

## 2025-04-29 ENCOUNTER — HOSPITAL ENCOUNTER (OUTPATIENT)
Dept: MRI IMAGING | Facility: HOSPITAL | Age: 45
Discharge: HOME | End: 2025-04-29
Attending: FAMILY MEDICINE
Payer: COMMERCIAL

## 2025-04-29 DIAGNOSIS — M23.91 LOCKING OF RIGHT KNEE: ICD-10-CM

## 2025-04-29 DIAGNOSIS — M25.561 CHRONIC PAIN OF RIGHT KNEE: ICD-10-CM

## 2025-04-29 DIAGNOSIS — G89.29 CHRONIC PAIN OF RIGHT KNEE: ICD-10-CM

## 2025-04-29 PROCEDURE — 73721 MRI JNT OF LWR EXTRE W/O DYE: CPT

## 2025-04-29 NOTE — TELEPHONE ENCOUNTER
Caller: Molly    Doctor: Dr. Leonidas Otto    Reason for call: Please fax PT referral to [facility] @[phone]     Call back#: [phone]

## 2025-04-29 NOTE — TELEPHONE ENCOUNTER
Caller: Toy augustin-Nery    Doctor: Dr. Peñaloza    Reason for call: Sig other calling asking if referral for PT can be faxed to the number below.  Patient will call with fax number.    Call back#: [phone]

## 2025-05-01 DIAGNOSIS — M25.561 CHRONIC PAIN OF RIGHT KNEE: Primary | ICD-10-CM

## 2025-05-01 DIAGNOSIS — G89.29 CHRONIC PAIN OF RIGHT KNEE: Primary | ICD-10-CM

## 2025-05-01 NOTE — TELEPHONE ENCOUNTER
Caller: [facility]    Doctor: Dr. Lauren    Reason for call: Please FAX PT RX. Patient is currently there to start PT    Call back#: [phone]    Fax # [phone]

## 2025-05-01 NOTE — TELEPHONE ENCOUNTER
Caller: Harrison with [facility]     Doctor: Dr. Lauren    Reason for call: Harrison stated patient thought he would receiving aqua therapy, but the script is for PT. Please advise.    Please fax updated script.    Call back#: [phone]   Fax # [phone]

## 2025-05-12 ENCOUNTER — OFFICE VISIT (OUTPATIENT)
Dept: OBGYN CLINIC | Facility: CLINIC | Age: 45
End: 2025-05-12
Payer: COMMERCIAL

## 2025-05-12 VITALS — BODY MASS INDEX: 34.71 KG/M2 | HEIGHT: 68 IN | WEIGHT: 229 LBS

## 2025-05-12 DIAGNOSIS — S83.241A OTHER TEAR OF MEDIAL MENISCUS, CURRENT INJURY, RIGHT KNEE, INITIAL ENCOUNTER: Primary | ICD-10-CM

## 2025-05-12 PROCEDURE — 99213 OFFICE O/P EST LOW 20 MIN: CPT | Performed by: FAMILY MEDICINE

## 2025-05-12 NOTE — PROGRESS NOTES
Assessment:     Assessment & Plan  Other tear of medial meniscus, current injury, right knee, initial encounter    Orders:    Brace    Ambulatory Referral to Orthopedic Surgery; Future        Diagnosis and Orders:      1. Other tear of medial meniscus, current injury, right knee, initial encounter  Brace    Ambulatory Referral to Orthopedic Surgery        Orders Placed This Encounter   Procedures    Brace    Ambulatory Referral to Orthopedic Surgery        Impression:   Right knee pain likely multi-factorial secondary to primary knee osteoarthritis as well as medial meniscal pathology.       Conservative Management   We discussed different treatment options:  Previously reviewed/discussed  Reviewed ED documentation completed on 04/15/2025.  Treatment plan consist of x-rays and referral to Ortho/sports medicine.  Patient has also been seen in the ED for acute right sided low back pain for which she is completing formal physical therapy   Reviewed there may be a component of right sided lumbar radiculopathy.  Recommended formal physical therapy  Reviewed current medication which includes naproxen  Reviewed BMP completed on 11/2023 which was normal  Ice or Heat Therapy as needed 1-2 times daily for 10-20 minutes. As tolerated.   Over the counter Tylenol and/or NSAIDs  as needed based off your Past Medical Hx. Please follow product label for dosing and maximum limits.    Trial of over the counter Topical Analgesics such as Lidocaine cream or Voltaren Gel, as tolerated. If skin becomes irritated, discontinue use.   Formal Handout provided on General Information of knee  Please range joint through gentle range of motion as tolerated.   Initiate Formal Physical Therapy at any preferred location.  Prescription provided for the low back.  Today's discussion/review  Reviewed MRI.  Will trial hinged knee brace  Due to medial meniscus tearing with normal cartilage will have patient follow-up with orthopedic surgeon to discuss  conservative or surgical management.  Referral placed  Recommended initiation of formal physical therapy for the low back.  Patient has right sided lumbar radiculopathy        Imaging   Reviewed prior xrays obtain in Urgent or Emergency Department. These were reviewed in office with patient today.   04/15/2025 right knee x-ray: No acute osseous abnormality  Kellgren-Blake Classification      Present Grade Radiological findings   []  0 No radiological findings   [x]  1 Doubtful narrowing of joint space and possible osteophytic lipping   [x]  2 Definite osteophytes and possible narrowing of joint space   []  3 Moderate multiple osteophytes, definitive narrowing of joint space, small pseudocystic areas with sclerotic walls and possibly deformity of bone contour   []  4 Large osteophytes, marked narrowing of joint space, severe sclerosis and definitive deformity of bone contour   **If more than 1 [x] is present patient is between grades   04/29/2025 MRI right knee: Medial meniscus pathology   IMPRESSION:     Complex predominantly radial tear of the medial meniscus body and posterior horn with slight medial extrusion.     Mucoid degeneration of the ACL.     Minimal bone marrow edema femoral attachment of the ACL likely mild stress response.     Procedure  No Injection performed today. May consider future corticosteroid injection depending on clinical exam/imaging.     Shared decision making, patient agreeable to plan.      Return for Follow up with Orthopedic Surgeon (knee) .    HPI:   Vicky Bettencourt is a 44 y.o. male  who presents for evaluation of   Chief Complaint   Patient presents with    Left Knee - Pain, Swelling       Denies any new trauma.  Pain remains the same.  5 out of 10 today.    Following History Reviewed and Updated     Past Medical History:   Diagnosis Date    Anxiety     Depression     GERD (gastroesophageal reflux disease)     Migraine     No known problems      Past Surgical History:  "  Procedure Laterality Date    IR BIOPSY LIVER MASS  7/9/2019    NO PAST SURGERIES       History reviewed. No pertinent family history.    Social History     Substance and Sexual Activity   Alcohol Use Yes    Comment: social     Social History     Substance and Sexual Activity   Drug Use Never     Social History     Tobacco Use   Smoking Status Every Day    Current packs/day: 0.50    Types: Cigarettes   Smokeless Tobacco Never       Social Drivers of Health     Tobacco Use: High Risk (5/12/2025)    Patient History     Smoking Tobacco Use: Every Day     Smokeless Tobacco Use: Never     Passive Exposure: Not on file   Alcohol Use: Not on file   Financial Resource Strain: Not on file   Food Insecurity: Not on file   Transportation Needs: Not on file   Physical Activity: Not on file   Stress: Not on file   Social Connections: Not on file   Intimate Partner Violence: Not on file   Depression: Not on file   Housing Stability: Not on file   Utilities: Not on file   Health Literacy: Not on file        No Known Allergies    Review of Systems      Review of Systems     Review of Systems   Constitutional: Negative for chills and fever.   HENT: Negative for drooling and sneezing.    Eyes: Negative for redness.   Respiratory: Negative for cough and wheezing.    Gastrointestinal: Negative for vomiting.   Psychiatric/Behavioral: Negative for behavioral problems. The patient is not nervous/anxious.      All other systems negative.   Physical Exam   Physical Exam    Vitals and nursing note reviewed.  Constitutional:   Appearance. Normal Appearance.  Ht 5' 8\" (1.727 m)   Wt 104 kg (229 lb)   BMI 34.82 kg/m²     Body mass index is 34.82 kg/m².   HENT:  Head: Atraumatic.  Nose: Nose normal  Eyes: Conjunctiva/sclera: Conjunctivae normal.  Cardiovascular:   Rate and Rhythm: Bilateral equal distal pulses  Pulmonary:   Effort: Pulmonary effort is normal  Skin:   General: Skin is warm and dry.  Neurological:   General: No focal deficit " "present.  Mental Status: Alert and oriented to person, place, and time.   Psychiatric:   Mood and Affect: mood normal.  Behavior: Behavior normal     Musculoskeletal Exam     Ortho Exam     Right  Knee:       General :   alert and oriented, in no acute distress   Gait: Normal. The patient can bear weight on the injured extremity.   Right Lower Extremity  Knee Effusion:  None.   Ecchymosis:  none   Knee ROM:  Intact   Patella:  Patella does track normally and symmetrical.  (observing the patella for smooth motion while the patient contracts the quadriceps muscle)     Inspection: Negative for Erythema or warmth   Tenderness: medial joint line and lateral joint line   Strength: Grossly Intact Bilaterally    Sensation:   intact to light touch   Pulses: normal PT pulses        (Test not completed if space left blank )                 Procedures       Portions of the record may have been created with voice recognition software. Occasional wrong word or \"sound alike\" substitutions may have occurred due to the inherent limitations of voice recognition software. Please review the chart carefully and recognize, using context, where substitutions/typographical errors may have occurred.   "

## 2025-05-19 ENCOUNTER — APPOINTMENT (OUTPATIENT)
Age: 45
End: 2025-05-19
Attending: ORTHOPAEDIC SURGERY
Payer: COMMERCIAL

## 2025-05-19 ENCOUNTER — OFFICE VISIT (OUTPATIENT)
Age: 45
End: 2025-05-19
Payer: COMMERCIAL

## 2025-05-19 DIAGNOSIS — M25.561 RIGHT KNEE PAIN, UNSPECIFIED CHRONICITY: ICD-10-CM

## 2025-05-19 DIAGNOSIS — S83.241A OTHER TEAR OF MEDIAL MENISCUS, CURRENT INJURY, RIGHT KNEE, INITIAL ENCOUNTER: Primary | ICD-10-CM

## 2025-05-19 DIAGNOSIS — Z01.89 ENCOUNTER FOR LOWER EXTREMITY COMPARISON IMAGING STUDY: ICD-10-CM

## 2025-05-19 PROCEDURE — 73564 X-RAY EXAM KNEE 4 OR MORE: CPT

## 2025-05-19 PROCEDURE — 73560 X-RAY EXAM OF KNEE 1 OR 2: CPT

## 2025-05-19 PROCEDURE — 99214 OFFICE O/P EST MOD 30 MIN: CPT | Performed by: ORTHOPAEDIC SURGERY

## 2025-05-19 RX ORDER — SODIUM CHLORIDE, SODIUM LACTATE, POTASSIUM CHLORIDE, CALCIUM CHLORIDE 600; 310; 30; 20 MG/100ML; MG/100ML; MG/100ML; MG/100ML
125 INJECTION, SOLUTION INTRAVENOUS CONTINUOUS
OUTPATIENT
Start: 2025-05-19

## 2025-05-19 RX ORDER — CHLORHEXIDINE GLUCONATE ORAL RINSE 1.2 MG/ML
15 SOLUTION DENTAL ONCE
OUTPATIENT
Start: 2025-05-19 | End: 2025-05-19

## 2025-05-19 NOTE — PROGRESS NOTES
:  Assessment & Plan  Other tear of medial meniscus, current injury, right knee, initial encounter      RIGHT knee medial meniscus tear  Discussed treatment options - patient would like to pursue surgical intervention for medial meniscus tear   Discussed surgical procedure with patient in depth   Tried extensive nonoperative treatment without relief   Having catching and mechanical symptoms  Risks and benefits discussed  Patient is interested in surgery at this time  Surgery consent signed today   Follow up for arthroscopic RIGHT knee medial menisectomy vs meniscus repair     Patient will get PCP clearance prior to surgery   Patient able to take NSAIDs     Brace provided today for post-op         REASON FOR VISIT:  Chief Complaint   Patient presents with    Right Knee - Pain         HISTORY OF PRESENT ILLNESS:  RIGHT knee pain     Patient evaluated in the ED on 4/15/25 for knee pain  No acute injuries or acute trauma  First noticed in November  Worse with activities and at rest   More pain in April, consistently   Pain on the lateral knee    Started aqua therapy for lower back   No formal PT for the knee   Taking Aleve PRN  Was provided with brace, worsened pain       Past Medical History:   Diagnosis Date    Anxiety     Depression     GERD (gastroesophageal reflux disease)     Migraine     No known problems         Past Surgical History:   Procedure Laterality Date    IR BIOPSY LIVER MASS  7/9/2019    NO PAST SURGERIES         Social History     Socioeconomic History    Marital status: Significant Other     Spouse name: Not on file    Number of children: Not on file    Years of education: Not on file    Highest education level: Not on file   Occupational History    Not on file   Tobacco Use    Smoking status: Every Day     Current packs/day: 0.50     Types: Cigarettes    Smokeless tobacco: Never   Vaping Use    Vaping status: Never Used   Substance and Sexual Activity    Alcohol use: Yes     Comment: social    Drug  use: Never    Sexual activity: Not on file   Other Topics Concern    Not on file   Social History Narrative    Not on file     Social Drivers of Health     Financial Resource Strain: Not on file   Food Insecurity: Not on file   Transportation Needs: Not on file   Physical Activity: Not on file   Stress: Not on file   Social Connections: Not on file   Intimate Partner Violence: Not on file   Housing Stability: Not on file       MEDICATIONS:  Current Medications[1]    ALLERGIES:  Allergies[2]      MAJOR FINDINGS:  Vicky Bettencourt is pleasant, healthy appearing, and in no acute distress.     RIGHT knee  Skin intact, ROM 0-0-120   Trace effusion  Normal patella tracking   No patella apprehension  Joint line tenderness: +medial, Javi test: positive  Anterior drawer: negative, Lachman: stable, Posterior drawer: negative   Stable to varus/valgus  Sensation intact sp/dp/t  Strength intact 5/5 dorsiflexion/plantarflexion/SLR   Extremity wwp  No calf pain      LEFT knee  Skin intact, ROM 5-0-130   No effusion  Normal patella tracking   No patella apprehension  Joint line tenderness: none, Javi test: negative  Anterior drawer: negative, Lachman: stable, Posterior drawer: negative   Stable to varus/valgus  Sensation intact sp/dp/t  Strength intact 5/5 dorsiflexion/plantarflexion/SLR   Extremity wwp  No calf pain      IMAGING  I personally reviewed and interpreted the imaging studies  X-rays performed on the RIGHT knee show no signs of significant arthritis with preserved joint space      MRI right knee 4/29/25  Complex predominantly radial tear of the medial meniscus body and posterior horn with slight medial extrusion.     Mucoid degeneration of the ACL.     Minimal bone marrow edema femoral attachment of the ACL likely mild stress response.      CC:  No primary care provider on file. Golden Peñaloza*          [1]   Current Outpatient Medications:     benzonatate (TESSALON PERLES) 100 mg capsule, Take 1  capsule (100 mg total) by mouth every 8 (eight) hours, Disp: 21 capsule, Rfl: 0    buprenorphine-naloxone (SUBOXONE) 8-2 mg per SL tablet, Place 1 tablet under the tongue 2 (two) times a day, Disp: , Rfl:     methocarbamol (ROBAXIN) 500 mg tablet, Take 1 tablet (500 mg total) by mouth 2 (two) times a day, Disp: 20 tablet, Rfl: 0    methocarbamol (ROBAXIN) 500 mg tablet, Take 1 tablet (500 mg total) by mouth 3 (three) times a day, Disp: 12 tablet, Rfl: 0    naproxen (EC NAPROSYN) 500 MG EC tablet, Take 1 tablet (500 mg total) by mouth 2 (two) times a day with meals, Disp: 12 tablet, Rfl: 0    naproxen (Naprosyn) 500 mg tablet, Take 1 tablet (500 mg total) by mouth 2 (two) times a day with meals, Disp: 30 tablet, Rfl: 0  [2] No Known Allergies

## 2025-06-18 ENCOUNTER — TELEPHONE (OUTPATIENT)
Age: 45
End: 2025-06-18

## 2025-06-18 NOTE — TELEPHONE ENCOUNTER
Attempted to call patient to have labs/EKG done before [facility] appt this afternoon. Did not answer, mailbox is full.

## 2025-06-19 ENCOUNTER — TELEPHONE (OUTPATIENT)
Age: 45
End: 2025-06-19

## 2025-06-19 NOTE — TELEPHONE ENCOUNTER
Spoke with the patient. He would like to cancel surgery at this time as he is unsure about his recovery afterward. He will call back if he would like to reschedule.